# Patient Record
Sex: FEMALE | Race: WHITE | NOT HISPANIC OR LATINO | Employment: FULL TIME | URBAN - METROPOLITAN AREA
[De-identification: names, ages, dates, MRNs, and addresses within clinical notes are randomized per-mention and may not be internally consistent; named-entity substitution may affect disease eponyms.]

---

## 2019-01-17 DIAGNOSIS — G47.33 OSA (OBSTRUCTIVE SLEEP APNEA): Primary | ICD-10-CM

## 2019-02-14 ENCOUNTER — DOCUMENTATION ONLY (OUTPATIENT)
Dept: SLEEP MEDICINE | Facility: CLINIC | Age: 44
End: 2019-02-14

## 2023-03-27 ENCOUNTER — TELEPHONE (OUTPATIENT)
Dept: OPHTHALMOLOGY | Facility: CLINIC | Age: 48
End: 2023-03-27
Payer: COMMERCIAL

## 2023-03-27 NOTE — TELEPHONE ENCOUNTER
Attempted to reach patient's parents to schedule appointment to be seen by John Acuna, or Chasidy.    Left a voicemail with the clinic number for them to call back.

## 2023-04-07 ENCOUNTER — TELEPHONE (OUTPATIENT)
Dept: OPHTHALMOLOGY | Facility: CLINIC | Age: 48
End: 2023-04-07
Payer: COMMERCIAL

## 2023-04-07 NOTE — TELEPHONE ENCOUNTER
Attempted to reach out to patient to help schedule an appointment to be seen by Dr. Dia, Dr. Lopez, or Dr. Umaña for Adult Strabismus.    Left a voicemail with the clinic number for them to call back.

## 2023-05-02 ENCOUNTER — OFFICE VISIT (OUTPATIENT)
Dept: OPHTHALMOLOGY | Facility: CLINIC | Age: 48
End: 2023-05-02
Attending: OPHTHALMOLOGY
Payer: COMMERCIAL

## 2023-05-02 DIAGNOSIS — H50.15 CONSECUTIVE ALTERNATING EXOTROPIA: Primary | ICD-10-CM

## 2023-05-02 DIAGNOSIS — H51.8 DVD (DISSOCIATED VERTICAL DEVIATION): ICD-10-CM

## 2023-05-02 PROCEDURE — 92060 SENSORIMOTOR EXAMINATION: CPT | Performed by: OPHTHALMOLOGY

## 2023-05-02 PROCEDURE — G0463 HOSPITAL OUTPT CLINIC VISIT: HCPCS | Mod: 25 | Performed by: OPHTHALMOLOGY

## 2023-05-02 PROCEDURE — 92004 COMPRE OPH EXAM NEW PT 1/>: CPT | Performed by: OPHTHALMOLOGY

## 2023-05-02 PROCEDURE — 92015 DETERMINE REFRACTIVE STATE: CPT

## 2023-05-02 RX ORDER — AMLODIPINE BESYLATE 10 MG/1
1 TABLET ORAL DAILY
COMMUNITY
Start: 2022-09-26

## 2023-05-02 RX ORDER — SPIRONOLACTONE 50 MG/1
50 TABLET, FILM COATED ORAL
COMMUNITY
Start: 2022-05-26 | End: 2023-08-02

## 2023-05-02 RX ORDER — PROPRANOLOL HCL 60 MG
60 CAPSULE, EXTENDED RELEASE 24HR ORAL DAILY
COMMUNITY
Start: 2022-12-29 | End: 2023-12-29

## 2023-05-02 ASSESSMENT — REFRACTION
OS_AXIS: 090
OD_AXIS: 105
OD_SPHERE: -0.50
OD_CYLINDER: +1.00
OS_CYLINDER: +1.00
OS_SPHERE: -0.50

## 2023-05-02 ASSESSMENT — VISUAL ACUITY
CORRECTION_TYPE: GLASSES
OS_CC: 20/20
OS_CC+: +1
OD_CC: 20/15
METHOD: SNELLEN - LINEAR

## 2023-05-02 ASSESSMENT — REFRACTION_WEARINGRX
OS_ADD: +2.25
OD_SPHERE: -0.50
OS_CYLINDER: +1.00
OD_CYLINDER: +1.50
OS_SPHERE: -0.50
OD_ADD: +2.25
OS_AXIS: 095
OD_AXIS: 110

## 2023-05-02 ASSESSMENT — CONF VISUAL FIELD
OD_SUPERIOR_NASAL_RESTRICTION: 0
OD_INFERIOR_NASAL_RESTRICTION: 0
OS_NORMAL: 1
OS_INFERIOR_NASAL_RESTRICTION: 0
OS_SUPERIOR_TEMPORAL_RESTRICTION: 0
OD_SUPERIOR_TEMPORAL_RESTRICTION: 0
OD_NORMAL: 1
OD_INFERIOR_TEMPORAL_RESTRICTION: 0
OS_SUPERIOR_NASAL_RESTRICTION: 0
OS_INFERIOR_TEMPORAL_RESTRICTION: 0
METHOD: COUNTING FINGERS

## 2023-05-02 ASSESSMENT — TONOMETRY
OD_IOP_MMHG: 15
IOP_METHOD: ICARE
OS_IOP_MMHG: 11

## 2023-05-02 NOTE — NURSING NOTE
Chief Complaint(s) and History of Present Illness(es)     Strabismus Evaluation            Laterality: both eyes    Onset: present since childhood    Course: gradually worsening    Associated symptoms: Negative for droopy eyelid, blurred vision and headaches    Treatments tried: glasses and surgery    Comments: H/o congenital ET s/p repair at age 1 in [facility] 10/24/74. Worsening XT over last few years. 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 Glasses at age 4-5 and again at age 11. Not sure if she ever patched. Denies diplopia or blurred vision. Either eye will drift.           Comments    Inf: pt    Unable to get surgical records from [place].   Referred by Dr. Covarrubias for surgical eval of XT

## 2023-05-03 ASSESSMENT — EXTERNAL EXAM - LEFT EYE: OS_EXAM: NORMAL

## 2023-05-03 ASSESSMENT — SLIT LAMP EXAM - LIDS
COMMENTS: MGD
COMMENTS: MGD

## 2023-05-03 ASSESSMENT — CUP TO DISC RATIO
OD_RATIO: 0.1
OS_RATIO: 0.2

## 2023-05-03 ASSESSMENT — EXTERNAL EXAM - RIGHT EYE: OD_EXAM: NORMAL

## 2023-05-03 NOTE — PROGRESS NOTES
"Chief Complaints and History of Present Illnesses   Patient presents with     Strabismus Evaluation     H/o congenital ET s/p repair at age 1 in [facility] 10/24/74. Worsening XT over last few years. 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 Glasses at age 4-5 and again at age 11. Not sure if she ever patched. Denies diplopia or blurred vision. Either eye will drift.    Review of systems for the eyes was negative other than the pertinent positives and negatives noted in the HPI.  History is obtained from the patient     Referring provider: Referred Self     Primary care: Raghavendra Hope   Assessment   Lora Cardenas is a 47 year old female who presents with:       ICD-10-CM    1. Consecutive alternating exotropia  H50.15 Sensorimotor      2. DVD (dissociated vertical deviation)  H51.8 Sensorimotor            Plan  Lora has consecutive exotropia (s/p strabismus repair at age 1 for congenital ET)  I recommend eye muscle surgery. Today with Lora, I reviewed the indications, risks, benefits, and alternatives of eye muscle surgery including, but not limited to, failure obtain the desired ocular alignment (\"over\" or \"under\" correction), diplopia, and damage to any structure in or around the eye that may necessitate treatment with medicine, laser, or surgery. I further explained that the goal of surgery is to help control Lora's strabismus. Surgery will not \"cure\" Lora's strabismus or resolve/prevent the need for refractive correction. Additional strabismus surgery may be required in the short or long term. I emphasized that regular follow-up to monitor and optimize her vision and alignment would be necessary. We also discussed the risks of surgical injury, bleeding, and infection which may necessitate further medical or surgical treatment and which may result in diplopia, loss of vision, blindness, or loss of the eye(s) in less than 1% of cases and the remote possibility of " "permanent damage to any organ system or death with the use of general anesthesia.  I explained that we would hide visible scars as much as possible in natural creases but that every patient heals and pigments differently resulting in a variable degree of scarring to the eyes or surrounding facial structures after surgery.  I provided multiple opportunities for questions, answered all questions to the best of my ability, and confirmed that my answers and my discussion were understood.       Further details of the management plan can be found in the \"Patient Instructions\" section which was printed and given to the patient at checkout.  No follow-ups on file.   Attending Physician Attestation:  Complete documentation of historical and exam elements from today's encounter can be found in the full encounter summary report (not reduplicated in this progress note).  I personally obtained the chief complaint(s) and history of present illness.  I confirmed and edited as necessary the review of systems, past medical/surgical history, family history, social history, and examination findings as documented by others; and I examined the patient myself.  I personally reviewed the relevant tests, images, and reports as documented above.  I formulated and edited as necessary the assessment and plan and discussed the findings and management plan with the patient and family. - Candi Lopez MD 5/3/2023 11:42 AM        "

## 2023-07-19 ENCOUNTER — TELEPHONE (OUTPATIENT)
Dept: OPHTHALMOLOGY | Facility: CLINIC | Age: 48
End: 2023-07-19
Payer: COMMERCIAL

## 2023-07-21 ENCOUNTER — TELEPHONE (OUTPATIENT)
Dept: OPHTHALMOLOGY | Facility: CLINIC | Age: 48
End: 2023-07-21
Payer: COMMERCIAL

## 2023-07-28 ENCOUNTER — OFFICE VISIT (OUTPATIENT)
Dept: OPHTHALMOLOGY | Facility: CLINIC | Age: 48
End: 2023-07-28
Attending: OPHTHALMOLOGY
Payer: COMMERCIAL

## 2023-07-28 DIAGNOSIS — H51.8 DVD (DISSOCIATED VERTICAL DEVIATION): ICD-10-CM

## 2023-07-28 DIAGNOSIS — H50.15 CONSECUTIVE ALTERNATING EXOTROPIA: Primary | ICD-10-CM

## 2023-07-28 PROCEDURE — 99207 EXTERNAL PHOTOS 9 CARDINAL GAZES: CPT | Mod: 26 | Performed by: OPHTHALMOLOGY

## 2023-07-28 PROCEDURE — 92285 EXTERNAL OCULAR PHOTOGRAPHY: CPT

## 2023-07-28 RX ORDER — HYDROXYZINE HYDROCHLORIDE 25 MG/1
25 TABLET, FILM COATED ORAL
COMMUNITY
Start: 2023-06-12

## 2023-07-28 ASSESSMENT — REFRACTION_WEARINGRX
OS_ADD: +2.50
OS_SPHERE: -0.50
OD_ADD: +2.50
OD_SPHERE: -0.50
OS_CYLINDER: +1.25
OD_AXIS: 110
OD_CYLINDER: +1.50
OS_AXIS: 095

## 2023-07-28 ASSESSMENT — CONF VISUAL FIELD
OS_SUPERIOR_NASAL_RESTRICTION: 0
OS_INFERIOR_TEMPORAL_RESTRICTION: 0
METHOD: COUNTING FINGERS
OS_INFERIOR_NASAL_RESTRICTION: 0
OD_SUPERIOR_TEMPORAL_RESTRICTION: 0
OD_SUPERIOR_NASAL_RESTRICTION: 0
OD_INFERIOR_TEMPORAL_RESTRICTION: 0
OS_NORMAL: 1
OD_INFERIOR_NASAL_RESTRICTION: 0
OS_SUPERIOR_TEMPORAL_RESTRICTION: 0
OD_NORMAL: 1

## 2023-07-28 ASSESSMENT — VISUAL ACUITY
OD_CC: 20/20
OS_CC+: -1
OS_CC: 20/20
METHOD: SNELLEN - LINEAR

## 2023-07-28 NOTE — NURSING NOTE
Chief Complaint(s) and History of Present Illness(es)       Exotropia Follow Up              Laterality: both eyes    Onset: present since childhood    Associated symptoms: Negative for eye pain, blurred vision and headaches    Treatments tried: glasses and surgery    Comments: Surgery scheduled for 8/2 w/ Dr. Lopez.   Alignment stable since LV. LE seems to drift more often and bigger angle than RE. No diplopia.  Vision is clear with glasses.              Comments    Inf: pt

## 2023-07-28 NOTE — PROGRESS NOTES
Chief Complaint(s) & History of Present Illness  Chief Complaint(s) and History of Present Illness(es)       Exotropia Follow Up              Laterality: both eyes    Onset: present since childhood    Associated symptoms: Negative for eye pain, blurred vision and headaches    Treatments tried: glasses and surgery    Comments: Surgery scheduled for 8/2 w/ Dr. Lopez.   Alignment stable since LV. LE seems to drift more often and bigger angle than RE. No diplopia.  Vision is clear with glasses.              Comments    Inf: pt                     Assessment and Plan:      Lora Cardenas is a 47 year old female who presents with:     Consecutive alternating exotropia  s/p strabismus repair at age 1 for congenital ET   - Sensorimotor  - External Photos 9 [employer] Gazes    DVD (dissociated vertical deviation)  - Sensorimotor  - External Photos 9 [employer] Gazes  Stable alignment today, no diplopia        PLAN:  Continue to surgery as planned

## 2023-08-01 ENCOUNTER — ANESTHESIA EVENT (OUTPATIENT)
Dept: SURGERY | Facility: CLINIC | Age: 48
End: 2023-08-01
Payer: COMMERCIAL

## 2023-08-01 NOTE — ANESTHESIA PREPROCEDURE EVALUATION
Anesthesia Pre-Procedure Evaluation    Patient: Lora Cardenas   MRN: [id number] : 1975        Procedure : Procedure(s):  SIMPLE RECESSION OR RESECTION, MUSCLE, EXTRAOCULAR, BILATERAL, FOR STRABISMUS CORRECTION          Past Medical History:   Diagnosis Date    Asthma     EXERCISE INDUCED    Diabetes mellitus (H)     GESTATIONAL DIABETES    Hypertension     Strabismus       Past Surgical History:   Procedure Laterality Date    DAVINCI HYSTERECTOMY TOTAL  2012    Procedure:DAVINCI HYSTERECTOMY TOTAL; davinci total hysterectomy; Surgeon:CLEMENT RAYO; Location: OR    DISCECTOMY LUMBAR POSTERIOR MICROSCOPIC ONE LEVEL  2013    Procedure: DISCECTOMY LUMBAR POSTERIOR MICROSCOPIC ONE LEVEL;  MICRODISCECTOMY L5-S1  (MICROSCOPE, GOLDEN FRAME);  Surgeon: Dejan Gomez MD;  Location:  OR    ENT SURGERY      TONSILLECTOMY    EYE SURGERY  1973    congenital ET repair, [facility]    KNEE SURGERY      cartilage repair    ORTHOPEDIC SURGERY      left acl repair      Allergies   Allergen Reactions    Nuts Itching    Penicillins Anaphylaxis, Itching and Swelling     Throat tightness    Pollen Extract Difficulty breathing     Itchy throat    Pollen Extract/Tree Extract Shortness Of Breath     Itchy throat    Prunus Persica Hives and Itching     Cherries, peaches, apples plumbs  Causes throat itching, hives    Clindamycin Itching    Fruit [Peach] Hives and Itching     Cherries, peaches, apples plumbs  Causes throat itching, hives      Social History     Tobacco Use    Smoking status: Never    Smokeless tobacco: Not on file   Substance Use Topics    Alcohol use: Yes     Comment: twice a month      Wt Readings from Last 1 Encounters:   14 97.8 kg (215 lb 9.8 oz)        Anesthesia Evaluation            ROS/MED HX  ENT/Pulmonary:     (+) sleep apnea,                    asthma                  Neurologic:       Cardiovascular:     (+)  hypertension- -   -  - -                                       METS/Exercise Tolerance:     Hematologic:       Musculoskeletal:       GI/Hepatic:       Renal/Genitourinary:       Endo:       Psychiatric/Substance Use:       Infectious Disease:       Malignancy:       Other:               OUTSIDE LABS:  CBC:   Lab Results   Component Value Date    WBC 14.6 (H) 12/12/2013    WBC 9.8 12/10/2013    HGB 13.4 12/12/2013    HGB 14.8 12/10/2013    HCT 40.1 12/12/2013    HCT 43.1 12/10/2013     12/12/2013     12/10/2013     BMP:   Lab Results   Component Value Date     12/12/2013     12/10/2013    POTASSIUM 3.9 12/12/2013    POTASSIUM 3.6 12/10/2013    CHLORIDE 104 12/12/2013    CHLORIDE 97 12/10/2013    CO2 30 12/12/2013    CO2 32 12/10/2013    BUN 13 12/12/2013    BUN 20 12/10/2013    CR 0.91 12/12/2013    CR 1.01 12/10/2013     (H) 12/12/2013     (H) 12/10/2013     COAGS: No results found for: PTT, INR, FIBR  POC:   Lab Results   Component Value Date    HCG NEGATIVE 04/06/2012    HCGS Negative 12/10/2013     HEPATIC:   Lab Results   Component Value Date    ALBUMIN 3.6 03/06/2008    PROTTOTAL 6.9 03/06/2008    ALT 24 03/06/2008    AST 28 03/06/2008    ALKPHOS 133 03/06/2008    BILITOTAL <0.1 (L) 03/06/2008     OTHER:   Lab Results   Component Value Date    A1C 5.7 12/10/2013    SRIKANTH 9.0 12/12/2013    MAG 2.1 03/06/2008       Anesthesia Plan    ASA Status:  2       Anesthesia Type: General.     - Airway: ETT   Induction: Intravenous.   Maintenance: Balanced.        Consents            Postoperative Care    Pain management: IV analgesics.   PONV prophylaxis: Ondansetron (or other 5HT-3), Dexamethasone or Solumedrol     Comments:                Kanwal Zamudio MD

## 2023-08-02 ENCOUNTER — HOSPITAL ENCOUNTER (OUTPATIENT)
Facility: CLINIC | Age: 48
Discharge: HOME | End: 2023-08-02
Attending: OPHTHALMOLOGY | Admitting: OPHTHALMOLOGY
Payer: COMMERCIAL

## 2023-08-02 ENCOUNTER — ANESTHESIA (OUTPATIENT)
Dept: SURGERY | Facility: CLINIC | Age: 48
End: 2023-08-02
Payer: COMMERCIAL

## 2023-08-02 VITALS
TEMPERATURE: 98.1 F | OXYGEN SATURATION: 94 % | HEIGHT: 67 IN | RESPIRATION RATE: 14 BRPM | HEART RATE: 62 BPM | WEIGHT: 259.48 LBS | SYSTOLIC BLOOD PRESSURE: 112 MMHG | BODY MASS INDEX: 40.73 KG/M2 | DIASTOLIC BLOOD PRESSURE: 70 MMHG

## 2023-08-02 DIAGNOSIS — Z98.890 POSTOPERATIVE EYE STATE: Primary | ICD-10-CM

## 2023-08-02 PROBLEM — Z86.16 HISTORY OF 2019 NOVEL CORONAVIRUS DISEASE (COVID-19): Status: ACTIVE | Noted: 2021-01-05

## 2023-08-02 PROBLEM — F33.1 MAJOR DEPRESSIVE DISORDER, RECURRENT, MODERATE (H): Status: ACTIVE | Noted: 2020-05-04

## 2023-08-02 PROBLEM — N95.1 MENOPAUSAL SYMPTOMS: Status: ACTIVE | Noted: 2021-10-11

## 2023-08-02 PROBLEM — F33.2 SEVERE EPISODE OF RECURRENT MAJOR DEPRESSIVE DISORDER, WITHOUT PSYCHOTIC FEATURES (H): Status: ACTIVE | Noted: 2023-05-18

## 2023-08-02 PROBLEM — F41.1 GENERALIZED ANXIETY DISORDER: Status: ACTIVE | Noted: 2021-09-07

## 2023-08-02 PROBLEM — E78.5 HYPERLIPIDEMIA: Status: ACTIVE | Noted: 2021-09-07

## 2023-08-02 LAB
GLUCOSE BLDC GLUCOMTR-MCNC: 138 MG/DL (ref 70–99)
GLUCOSE BLDC GLUCOMTR-MCNC: 140 MG/DL (ref 70–99)

## 2023-08-02 PROCEDURE — 250N000009 HC RX 250: Performed by: OPHTHALMOLOGY

## 2023-08-02 PROCEDURE — 360N000076 HC SURGERY LEVEL 3, PER MIN: Performed by: OPHTHALMOLOGY

## 2023-08-02 PROCEDURE — 250N000011 HC RX IP 250 OP 636: Performed by: OPHTHALMOLOGY

## 2023-08-02 PROCEDURE — 272N000001 HC OR GENERAL SUPPLY STERILE: Performed by: OPHTHALMOLOGY

## 2023-08-02 PROCEDURE — 250N000009 HC RX 250: Performed by: NURSE ANESTHETIST, CERTIFIED REGISTERED

## 2023-08-02 PROCEDURE — 250N000011 HC RX IP 250 OP 636: Performed by: STUDENT IN AN ORGANIZED HEALTH CARE EDUCATION/TRAINING PROGRAM

## 2023-08-02 PROCEDURE — 370N000017 HC ANESTHESIA TECHNICAL FEE, PER MIN: Performed by: OPHTHALMOLOGY

## 2023-08-02 PROCEDURE — 999N000141 HC STATISTIC PRE-PROCEDURE NURSING ASSESSMENT: Performed by: OPHTHALMOLOGY

## 2023-08-02 PROCEDURE — 82962 GLUCOSE BLOOD TEST: CPT

## 2023-08-02 PROCEDURE — 250N000009 HC RX 250: Performed by: STUDENT IN AN ORGANIZED HEALTH CARE EDUCATION/TRAINING PROGRAM

## 2023-08-02 PROCEDURE — 250N000011 HC RX IP 250 OP 636: Mod: JZ | Performed by: NURSE ANESTHETIST, CERTIFIED REGISTERED

## 2023-08-02 PROCEDURE — 710N000012 HC RECOVERY PHASE 2, PER MINUTE: Performed by: OPHTHALMOLOGY

## 2023-08-02 PROCEDURE — 710N000010 HC RECOVERY PHASE 1, LEVEL 2, PER MIN: Performed by: OPHTHALMOLOGY

## 2023-08-02 PROCEDURE — 258N000003 HC RX IP 258 OP 636: Performed by: STUDENT IN AN ORGANIZED HEALTH CARE EDUCATION/TRAINING PROGRAM

## 2023-08-02 PROCEDURE — 250N000025 HC SEVOFLURANE, PER MIN: Performed by: OPHTHALMOLOGY

## 2023-08-02 PROCEDURE — 250N000013 HC RX MED GY IP 250 OP 250 PS 637: Performed by: OPHTHALMOLOGY

## 2023-08-02 RX ORDER — HYDROMORPHONE HYDROCHLORIDE 1 MG/ML
0.2 INJECTION, SOLUTION INTRAMUSCULAR; INTRAVENOUS; SUBCUTANEOUS EVERY 5 MIN PRN
Status: DISCONTINUED | OUTPATIENT
Start: 2023-08-02 | End: 2023-08-02 | Stop reason: HOSPADM

## 2023-08-02 RX ORDER — LIDOCAINE HYDROCHLORIDE 20 MG/ML
INJECTION, SOLUTION INFILTRATION; PERINEURAL PRN
Status: DISCONTINUED | OUTPATIENT
Start: 2023-08-02 | End: 2023-08-02

## 2023-08-02 RX ORDER — ACETAMINOPHEN 325 MG/1
975 TABLET ORAL ONCE
Status: COMPLETED | OUTPATIENT
Start: 2023-08-02 | End: 2023-08-02

## 2023-08-02 RX ORDER — ONDANSETRON 4 MG/1
4 TABLET, ORALLY DISINTEGRATING ORAL EVERY 30 MIN PRN
Status: DISCONTINUED | OUTPATIENT
Start: 2023-08-02 | End: 2023-08-02 | Stop reason: HOSPADM

## 2023-08-02 RX ORDER — ESTRADIOL 0.05 MG/D
1 PATCH, EXTENDED RELEASE TRANSDERMAL
COMMUNITY
Start: 2023-06-12 | End: 2024-06-11

## 2023-08-02 RX ORDER — FENTANYL CITRATE 50 UG/ML
50 INJECTION, SOLUTION INTRAMUSCULAR; INTRAVENOUS EVERY 5 MIN PRN
Status: DISCONTINUED | OUTPATIENT
Start: 2023-08-02 | End: 2023-08-02 | Stop reason: HOSPADM

## 2023-08-02 RX ORDER — SODIUM CHLORIDE, SODIUM LACTATE, POTASSIUM CHLORIDE, CALCIUM CHLORIDE 600; 310; 30; 20 MG/100ML; MG/100ML; MG/100ML; MG/100ML
INJECTION, SOLUTION INTRAVENOUS CONTINUOUS
Status: DISCONTINUED | OUTPATIENT
Start: 2023-08-02 | End: 2023-08-02 | Stop reason: HOSPADM

## 2023-08-02 RX ORDER — HYDROMORPHONE HYDROCHLORIDE 1 MG/ML
0.4 INJECTION, SOLUTION INTRAMUSCULAR; INTRAVENOUS; SUBCUTANEOUS EVERY 5 MIN PRN
Status: DISCONTINUED | OUTPATIENT
Start: 2023-08-02 | End: 2023-08-02 | Stop reason: HOSPADM

## 2023-08-02 RX ORDER — BETAMETHASONE SODIUM PHOSPHATE AND BETAMETHASONE ACETATE 3; 3 MG/ML; MG/ML
INJECTION, SUSPENSION INTRA-ARTICULAR; INTRALESIONAL; INTRAMUSCULAR; SOFT TISSUE PRN
Status: DISCONTINUED | OUTPATIENT
Start: 2023-08-02 | End: 2023-08-02 | Stop reason: HOSPADM

## 2023-08-02 RX ORDER — SODIUM CHLORIDE, SODIUM LACTATE, POTASSIUM CHLORIDE, CALCIUM CHLORIDE 600; 310; 30; 20 MG/100ML; MG/100ML; MG/100ML; MG/100ML
INJECTION, SOLUTION INTRAVENOUS CONTINUOUS PRN
Status: DISCONTINUED | OUTPATIENT
Start: 2023-08-02 | End: 2023-08-02

## 2023-08-02 RX ORDER — EPHEDRINE SULFATE 50 MG/ML
INJECTION, SOLUTION INTRAMUSCULAR; INTRAVENOUS; SUBCUTANEOUS PRN
Status: DISCONTINUED | OUTPATIENT
Start: 2023-08-02 | End: 2023-08-02

## 2023-08-02 RX ORDER — DEXAMETHASONE SODIUM PHOSPHATE 4 MG/ML
INJECTION, SOLUTION INTRA-ARTICULAR; INTRALESIONAL; INTRAMUSCULAR; INTRAVENOUS; SOFT TISSUE PRN
Status: DISCONTINUED | OUTPATIENT
Start: 2023-08-02 | End: 2023-08-02

## 2023-08-02 RX ORDER — KETOROLAC TROMETHAMINE 30 MG/ML
INJECTION, SOLUTION INTRAMUSCULAR; INTRAVENOUS PRN
Status: DISCONTINUED | OUTPATIENT
Start: 2023-08-02 | End: 2023-08-02

## 2023-08-02 RX ORDER — OXYCODONE HYDROCHLORIDE 5 MG/1
5 TABLET ORAL ONCE
Status: COMPLETED | OUTPATIENT
Start: 2023-08-02 | End: 2023-08-02

## 2023-08-02 RX ORDER — PROPOFOL 10 MG/ML
INJECTION, EMULSION INTRAVENOUS PRN
Status: DISCONTINUED | OUTPATIENT
Start: 2023-08-02 | End: 2023-08-02

## 2023-08-02 RX ORDER — OXYCODONE HYDROCHLORIDE 5 MG/1
5 TABLET ORAL EVERY 6 HOURS PRN
Qty: 6 TABLET | Refills: 0 | Status: SHIPPED | OUTPATIENT
Start: 2023-08-02 | End: 2023-08-05

## 2023-08-02 RX ORDER — ONDANSETRON 2 MG/ML
4 INJECTION INTRAMUSCULAR; INTRAVENOUS EVERY 30 MIN PRN
Status: DISCONTINUED | OUTPATIENT
Start: 2023-08-02 | End: 2023-08-02 | Stop reason: HOSPADM

## 2023-08-02 RX ORDER — FENTANYL CITRATE 50 UG/ML
INJECTION, SOLUTION INTRAMUSCULAR; INTRAVENOUS PRN
Status: DISCONTINUED | OUTPATIENT
Start: 2023-08-02 | End: 2023-08-02

## 2023-08-02 RX ORDER — FENTANYL CITRATE 50 UG/ML
25 INJECTION, SOLUTION INTRAMUSCULAR; INTRAVENOUS EVERY 5 MIN PRN
Status: DISCONTINUED | OUTPATIENT
Start: 2023-08-02 | End: 2023-08-02 | Stop reason: HOSPADM

## 2023-08-02 RX ORDER — BALANCED SALT SOLUTION 6.4; .75; .48; .3; 3.9; 1.7 MG/ML; MG/ML; MG/ML; MG/ML; MG/ML; MG/ML
SOLUTION OPHTHALMIC PRN
Status: DISCONTINUED | OUTPATIENT
Start: 2023-08-02 | End: 2023-08-02 | Stop reason: HOSPADM

## 2023-08-02 RX ORDER — OXYMETAZOLINE HYDROCHLORIDE 0.05 G/100ML
SPRAY NASAL PRN
Status: DISCONTINUED | OUTPATIENT
Start: 2023-08-02 | End: 2023-08-02 | Stop reason: HOSPADM

## 2023-08-02 RX ADMIN — Medication 10 MG: at 15:03

## 2023-08-02 RX ADMIN — MIDAZOLAM 2 MG: 1 INJECTION INTRAMUSCULAR; INTRAVENOUS at 14:06

## 2023-08-02 RX ADMIN — Medication 5 MG: at 15:09

## 2023-08-02 RX ADMIN — DEXAMETHASONE SODIUM PHOSPHATE 4 MG: 4 INJECTION, SOLUTION INTRA-ARTICULAR; INTRALESIONAL; INTRAMUSCULAR; INTRAVENOUS; SOFT TISSUE at 14:38

## 2023-08-02 RX ADMIN — SODIUM CHLORIDE, POTASSIUM CHLORIDE, SODIUM LACTATE AND CALCIUM CHLORIDE: 600; 310; 30; 20 INJECTION, SOLUTION INTRAVENOUS at 14:21

## 2023-08-02 RX ADMIN — PHENYLEPHRINE HYDROCHLORIDE 100 MCG: 10 INJECTION INTRAVENOUS at 15:03

## 2023-08-02 RX ADMIN — OXYCODONE HYDROCHLORIDE 5 MG: 5 TABLET ORAL at 16:32

## 2023-08-02 RX ADMIN — SUGAMMADEX 400 MG: 100 INJECTION, SOLUTION INTRAVENOUS at 15:46

## 2023-08-02 RX ADMIN — Medication 50 MG: at 14:21

## 2023-08-02 RX ADMIN — PROPOFOL 150 MG: 10 INJECTION, EMULSION INTRAVENOUS at 14:24

## 2023-08-02 RX ADMIN — ACETAMINOPHEN 975 MG: 325 TABLET, FILM COATED ORAL at 16:32

## 2023-08-02 RX ADMIN — FENTANYL CITRATE 100 MCG: 50 INJECTION, SOLUTION INTRAMUSCULAR; INTRAVENOUS at 14:21

## 2023-08-02 RX ADMIN — Medication 20 MG: at 15:12

## 2023-08-02 RX ADMIN — FENTANYL CITRATE 50 MCG: 50 INJECTION INTRAMUSCULAR; INTRAVENOUS at 16:32

## 2023-08-02 RX ADMIN — KETOROLAC TROMETHAMINE 30 MG: 30 INJECTION, SOLUTION INTRAMUSCULAR at 15:43

## 2023-08-02 RX ADMIN — PROPOFOL 200 MG: 10 INJECTION, EMULSION INTRAVENOUS at 14:21

## 2023-08-02 RX ADMIN — LIDOCAINE HYDROCHLORIDE 100 MG: 20 INJECTION, SOLUTION INFILTRATION; PERINEURAL at 14:06

## 2023-08-02 ASSESSMENT — ACTIVITIES OF DAILY LIVING (ADL)
ADLS_ACUITY_SCORE: 35
ADLS_ACUITY_SCORE: 33

## 2023-08-02 NOTE — ANESTHESIA PROCEDURE NOTES
Airway       Patient location during procedure: OR       Procedure Start/Stop Times: 8/2/2023 2:27 PM  Staff -        Anesthesiologist:  Behrens, Christopher J, MD       Resident/Fellow: Kanwal Zamudio MD       Performed By: resident and with residents       Procedure performed by resident/fellow/CRNA in presence of a teaching physician.    Consent for Airway        Urgency: elective  Indications and Patient Condition       Indications for airway management: jenna-procedural       Induction type:intravenous       Mask difficulty assessment: 1 - vent by mask    Final Airway Details       Final airway type: endotracheal airway       Successful airway: ETT - single  Endotracheal Airway Details        ETT size (mm): 7.0       Cuffed: yes       Successful intubation technique: direct laryngoscopy       DL Blade Type: MAC 3       Grade View of Cords: 2       Adjucts: stylet       Position: Right       Measured from: lips       Secured at (cm): 21       Bite block used: Soft    Post intubation assessment        Placement verified by: capnometry, equal breath sounds and chest rise        Number of attempts at approach: 1       Number of other approaches attempted: 0       Secured with: silk tape       Ease of procedure: easy       Dentition: Intact    Medication(s) Administered   Medication Administration Time: 8/2/2023 2:27 PM

## 2023-08-02 NOTE — DISCHARGE INSTRUCTIONS
Cool compresses as needed.  Ibuprofen and Tylenol as directed.  Oxycodone for breakthrough pain.  F/u with Dr. Lopez tomorrow as scheduled.      Strabismus Repair Discharge Instructions    Dr. Candi Lopez, Dr. Gurinder Ugarte, Dr. Indu Dia      Your eye doctor performed surgery to help align your eye(s). During surgery, certain eye muscles are adjusted. This helps the muscles better control how the eye moves. Often, surgery is done in addition to other treatments.   How Surgery Works  Strabismus surgery is a safe, common operation. The doctor changes the placement or length of an eye muscle. This small change can pull the eye into proper alignment. The two most common methods of surgery are:  Recession, in which a muscle is moved to a new position on the eye.  Resection, in which a small section of an eye muscle is removed.  What to Expect  It is normal to experience the following:  Eye Redness. This will resolve slowly over 2- 4 weeks.  Pink tinged tears  Swollen, painful or itchy eyes  Sensitivity to bright lights.  Trouble opening eyes for 1-2 days.  Feeling dizzy or off balance until you get used to seeing differently.  After Surgery Care  Avoid rubbing your eyes.  You may use cool cloths to help with pain and/or itching.  Minimize direct contact with water for 1 week. Showering or bathing is allowed.  You may use a warm, wet washcloth to remove any dried drainage from the eye. Wipe eye from inner corner to the outer corner of the eye.   No swimming for 1 week.  Use sunglasses or a hat to protect eyes from bright lights if you are light sensitive.  You may wear glasses as soon as needed.  No heavy lifting or contact sports for 1 week.   Use eye drops or eye ointment as directed to prevent infection and decrease swelling. Avoid touching surface of eye with the tube or bottle.   Suture Care  Sutures will dissolve over several weeks; they will not need to be removed.   Adjustable sutures may have been placed  during surgery. You may need to stay in the recovery room for a longer period after surgery before a possible suture adjustment is performed. Once the suture is adjusted you will be sent home.   When to Call the Doctor   Eyelids are progressively getting more swollen and painful after 24 hours.  You see a dramatic change in the position of the eye over time.  Frequent or continuous vomiting.  Green or yellow drainage from the eye.  Temperature over 101 degrees.  If your child experiences worsening RSVP (Redness, Sensitivity to light, Vision, Pain), or develops fever or worsening discharge, call EITHER  [phone] (8am-4:30pm Monday-Friday)  [phone] (after hours & weekends)  and ask to speak with the Ophthalmology Resident or Fellow On-Call or return to the eye clinic or emergency room immediately.        If your child is unable to tolerate food and drink, vomits 3 times, or appears to have decreased alertness or lethargy, return to the emergency room immediately. These can be signs of delayed gastrointestinal wake-up after anesthesia and your child may need IV fluids to prevent dehydration.    Follow Up  Follow up with your doctor in ***  Rev. 3/2018        Same-Day Surgery   Adult Discharge Orders & Instructions     For 24 hours after surgery:  Get plenty of rest.  A responsible adult must stay with you for at least 24 hours after you leave the hospital.   Pain medication can slow your reflexes. Do not drive or use heavy equipment.  If you have weakness or tingling, don't drive or use heavy equipment until this feeling goes away.  Mixing alcohol and pain medication can cause dizziness and slow your breathing. It can even be fatal. Do not drink alcohol while taking pain medication.  Avoid strenuous or risky activities.  Ask for help when climbing stairs.   You may feel lightheaded.  If so, sit for a few minutes before standing.  Have someone help you get up.   If you have nausea (feel sick to your  stomach), drink only clear liquids such as apple juice, ginger ale, broth or 7-Up.  Rest may also help.  Be sure to drink enough fluids.  Move to a regular diet as you feel able. Take pain medications with a small amount of solid food, such as toast or crackers, to avoid nausea.   A slight fever is normal. Call the doctor if your fever is over 100 F (37.7 C) (taken under the tongue) or lasts longer than 24 hours.  You may have a dry mouth, muscle aches, trouble sleeping or a sore throat.  These symptoms should go away after 24 hours.  Do not make important or legal decisions.   Pain Management:      1. Take pain medication (if prescribed) for pain as directed by your physician.        2. WARNING: If the pain medication you have been prescribed contains Tylenol  (acetaminophen), DO NOT take additional doses of Tylenol (acetaminophen).     Call your doctor for any of the followin.  Signs of infection (fever, growing tenderness at the surgery site, severe pain, a large amount of drainage or bleeding, foul-smelling drainage, redness, swelling).    2.  It has been over 8 to 10 hours since surgery and you are still not able to urinate (pee).    3.  Headache for over 24 hours.    4.  Numbness, tingling or weakness the day after surgery (if you had spinal anesthesia).  To contact a doctor, call  ' [phone] and ask for the Resident On Call for:          ___ Ophthalmology ___ (answered 24 hours a day)  '   Emergency Department:  [place] [facility]: [phone]  [place] [facility]: [phone]               Rev. 10/2014

## 2023-08-02 NOTE — ANESTHESIA POSTPROCEDURE EVALUATION
Patient: Lora Cardenas    Procedure: Procedure(s):  SIMPLE RECESSION OR RESECTION, MUSCLE, EXTRAOCULAR, BILATERAL, FOR STRABISMUS CORRECTION       Anesthesia Type:  General    Note:  Disposition: Outpatient   Postop Pain Control: Uneventful            Sign Out: Well controlled pain   PONV: No   Neuro/Psych: Uneventful            Sign Out: Acceptable/Baseline neuro status   Airway/Respiratory: Uneventful            Sign Out: Acceptable/Baseline resp. status   CV/Hemodynamics: Uneventful            Sign Out: Acceptable CV status   Other NRE: NONE   DID A NON-ROUTINE EVENT OCCUR? No           Last vitals:  Vitals Value Taken Time   /70 08/02/23 1617   Temp     Pulse 73 08/02/23 1619   Resp 11 08/02/23 1619   SpO2 93 % 08/02/23 1619   Vitals shown include unvalidated device data.    Electronically Signed By: Christopher J. Behrens, MD  August 2, 2023  4:20 PM

## 2023-08-02 NOTE — ANESTHESIA CARE TRANSFER NOTE
Patient: Lora Cardenas    Procedure: Procedure(s):  SIMPLE RECESSION OR RESECTION, MUSCLE, EXTRAOCULAR, BILATERAL, FOR STRABISMUS CORRECTION       Diagnosis: Consecutive alternating exotropia [H50.15]  DVD (dissociated vertical deviation) [H51.8]  Diagnosis Additional Information: No value filed.    Anesthesia Type:   General     Note:    Oropharynx: spontaneously breathing  Level of Consciousness: drowsy  Oxygen Supplementation: nasal cannula    Independent Airway: airway patency satisfactory and stable  Dentition: dentition unchanged  Vital Signs Stable: post-procedure vital signs reviewed and stable  Report to RN Given: handoff report given  Patient transferred to: PACU    Handoff Report: Identifed the Patient, Identified the Reponsible Provider, Reviewed the pertinent medical history, Discussed the surgical course, Reviewed Intra-OP anesthesia mangement and issues during anesthesia, Set expectations for post-procedure period and Allowed opportunity for questions and acknowledgement of understanding      Vitals:  Vitals Value Taken Time   BP     Temp     Pulse     Resp     SpO2         Electronically Signed By: DYAN Yao CRNA  August 2, 2023  4:00 PM

## 2023-08-03 ENCOUNTER — OFFICE VISIT (OUTPATIENT)
Dept: OPHTHALMOLOGY | Facility: CLINIC | Age: 48
End: 2023-08-03
Attending: OPHTHALMOLOGY
Payer: COMMERCIAL

## 2023-08-03 DIAGNOSIS — H50.15 CONSECUTIVE ALTERNATING EXOTROPIA: Primary | ICD-10-CM

## 2023-08-03 DIAGNOSIS — H51.8 DVD (DISSOCIATED VERTICAL DEVIATION): ICD-10-CM

## 2023-08-03 PROCEDURE — 99024 POSTOP FOLLOW-UP VISIT: CPT | Performed by: OPHTHALMOLOGY

## 2023-08-03 PROCEDURE — G0463 HOSPITAL OUTPT CLINIC VISIT: HCPCS | Performed by: OPHTHALMOLOGY

## 2023-08-03 ASSESSMENT — REFRACTION_WEARINGRX
OS_CYLINDER: +1.25
OD_CYLINDER: +1.50
OD_AXIS: 110
OS_ADD: +2.50
OD_ADD: +2.50
OD_SPHERE: -0.50
OS_SPHERE: -0.50
OS_AXIS: 095

## 2023-08-03 ASSESSMENT — VISUAL ACUITY
OS_CC+: -2
OD_CC+: -2
OD_CC: 20/25
OS_CC: 20/25
METHOD: SNELLEN - LINEAR

## 2023-08-03 ASSESSMENT — TONOMETRY: IOP_UNABLETOASSESS: 1

## 2023-08-03 ASSESSMENT — PATIENT HEALTH QUESTIONNAIRE - PHQ9: SUM OF ALL RESPONSES TO PHQ QUESTIONS 1-9: 4

## 2023-08-23 NOTE — OP NOTE
DOS 8/2/2023    PREOPERATIVE DIAGNOSIS:    1.  Consecutive exotropia.  2   Status post strabismus repair for congenital esotropia at age 1 in [place]    POSTOPERATIVE DIAGNOSIS:   Same    PROCEDURE PERFORMED:    1.  Forced duction testing  2.  Exploration and lysis of adhesions on previously operated right medial rectus and left medial rectus muscles.  3.  Advancement of right medial rectus from 5.0 mm to 3.0 mm from the original insertion.  4.  Advancement of left medial rectus muscle from 6.00mm to 4.0 mm from the original insertion.  5.  subConjunctival injection of Celestone both eyes    STAFF SURGEON: Candi Lopez MD.     ANESTHESIA: General.     ESTIMATED BLOOD LOSS: 1 mL.     COMPLICATIONS: None.     INDICATION FOR PROCEDURE  Ms. Cardenas is a 47-year-old woman who has noticed increasing exotropia.  She has a history of strabismus repair at age 1 for congenital esotropia.  The risk benefits and alternatives to strabismus repair to restore her binocular alignment were discussed including but not limited to infection, bleeding, loss of vision, over or under correction of her alignment, diplopia, need for further surgery.  She elects to proceed.    DETAILS OF PROCEDURE  After informed consent was obtained, Ms. Cardenas was taken to the operating room where general anesthesia was induced without complication. She was prepped and draped in sterile ophthalmic fashion.  Lid speculum were placed in both eyes and forced duction testing was performed.  There was no tightness in any direction.  The speculum was removed from the left eye and occluder was placed.  5-0 Traction suture placed at 6 and 12:00 of the limbus of the right eye and the eye was placed in the AB ducted position.  Nasal conjunctival peritomy was performed.  The inferonasal and superior nasal quadrants were dissected.  The right medial rectus muscle was hooked using small and Ghanshyam hooks.  The conjunctiva was carefully thinned and the scar tissue was  lysed as this was a previously operated muscle.  The muscle was carefully inspected and found to be in good condition.  It was noted to be 5.0 mm posterior to the original insertion.  The lid speculum was removed from the right eye and placed in the left eye.  5-0 silk traction sutures were placed at 6 and 12:00 of the limbus of the left eye.  The eye was placed in the AB ducted position.  A nasal conjunctival peritomy was performed. the inferonasal and superior nasal quadrants were dissected.  The left medial rectus muscle was poked using small and Ghanshyam hooks.  The conjunctiva was carefully thinned and the scar tissue was lysed as this was a previously operated muscle. the muscle was carefully inspected and noted to be in good condition.  The muscle was 6.0 mm posterior to the original insertion.  The insertion was imbricated using 6-0 double-armed Vicryl suture using central and peripheral locking bites.  The muscle was disinserted from the globe.  Cautery was used for hemostasis.  A caliper was used to measure 4.0 mm posterior to the original insertion and this was marked with a marking pen.  Scleral passes were performed to these marks and the muscle was tied in a bow.  The lid speculum was removed from the left eye and placed in the right eye the right medial process was prepped using small and Ghanshyam hooks. 6-0 double-armed Vicryl suture was used to imbricate the muscle at the insertion using central peripheral locking bites.  The muscle was disinserted from the globe.  Cautery was used for hemostasis.  A caliper was used to measure 3.0 mm posterior to the original surgeon and this was marked with a marking pen.  Scleral passes were performed these marks and the muscle was tied in a bow.  Snapback testing was performed and the eyes were felt to be in good alignment.  Both muscles were tied in a knot.  8-0 Vicryl was used to repair the conjunctiva.  Subconjunctival injections of Celestone were given to both  eyes.  TobraDex ointment was placed in both eyes.  Ms. Cardenas tolerated the procedure well and went to recovery room in stable condition.  She will follow-up in postoperative a #1 in the eye clinic.    NORRIS RUBIO MD

## 2023-08-25 ASSESSMENT — SLIT LAMP EXAM - LIDS
COMMENTS: MGD
COMMENTS: MGD

## 2023-08-25 ASSESSMENT — CUP TO DISC RATIO
OD_RATIO: 0.1
OS_RATIO: 0.2

## 2023-08-25 ASSESSMENT — EXTERNAL EXAM - LEFT EYE: OS_EXAM: NORMAL

## 2023-08-25 ASSESSMENT — EXTERNAL EXAM - RIGHT EYE: OD_EXAM: NORMAL

## 2023-08-25 NOTE — PROGRESS NOTES
"Chief Complaints and History of Present Illnesses   Patient presents with    Post Op (Ophthalmology) Both Eyes     POD1 Eye pain, irritation and nausea this am. Some intermittent diplopia, wasn't able to open eyes yesterday, better today. Taking codeine, tylenol and ibuprofen (8:15 am today)   Review of systems for the eyes was negative other than the pertinent positives and negatives noted in the HPI.  History is obtained from the patient    Referring provider: Referred Self     Primary care: Shelley Méndez BEBETO Cardenas is a 47 year old female who presents with:       ICD-10-CM    1. Consecutive alternating exotropia  H50.15       2. DVD (dissociated vertical deviation)  H51.8             Plan  Lora is doing well on Postoperative day #1 s/p  1.  Forced duction testing  2.  Exploration and lysis of adhesions on previously operated right medial rectus and left medial rectus muscles.  3.  Advancement of right medial rectus from 5.0 mm to 3.0 mm from the original insertion.  4.  Advancement of left medial rectus muscle from 6.00mm to 4.0 mm from the original insertion.  5.  subConjunctival injection of Celestone both eyes   Call with increasing pain,lid edema and erythema, and discharge.  Tobradex ointment twice a day x 5 days.  F/u 2-3 months       Further details of the management plan can be found in the \"Patient Instructions\" section which was printed and given to the patient at checkout.  No follow-ups on file.   Attending Physician Attestation:  Complete documentation of historical and exam elements from today's encounter can be found in the full encounter summary report (not reduplicated in this progress note).  I personally obtained the chief complaint(s) and history of present illness.  I confirmed and edited as necessary the review of systems, past medical/surgical history, family history, social history, and examination findings as documented by others; and I examined the patient myself.  " I personally reviewed the relevant tests, images, and reports as documented above.  I formulated and edited as necessary the assessment and plan and discussed the findings and management plan with the patient and family. - Candi Lopez MD 8/25/2023 11:48 AM

## 2024-08-31 ENCOUNTER — TELEPHONE (OUTPATIENT)
Dept: BEHAVIORAL HEALTH | Facility: CLINIC | Age: 49
End: 2024-08-31

## 2024-08-31 ENCOUNTER — HOSPITAL ENCOUNTER (EMERGENCY)
Facility: CLINIC | Age: 49
Discharge: HOME | End: 2024-09-02
Attending: EMERGENCY MEDICINE | Admitting: EMERGENCY MEDICINE
Payer: COMMERCIAL

## 2024-08-31 DIAGNOSIS — R45.851 SUICIDAL IDEATION: ICD-10-CM

## 2024-08-31 DIAGNOSIS — F33.2 MDD (MAJOR DEPRESSIVE DISORDER), RECURRENT SEVERE, WITHOUT PSYCHOSIS (H): ICD-10-CM

## 2024-08-31 DIAGNOSIS — F41.9 ANXIETY: ICD-10-CM

## 2024-08-31 PROCEDURE — 99222 1ST HOSP IP/OBS MODERATE 55: CPT | Mod: 95 | Performed by: PSYCHIATRY & NEUROLOGY

## 2024-08-31 PROCEDURE — 99285 EMERGENCY DEPT VISIT HI MDM: CPT | Mod: 25 | Performed by: EMERGENCY MEDICINE

## 2024-08-31 PROCEDURE — 250N000013 HC RX MED GY IP 250 OP 250 PS 637: Performed by: PSYCHIATRY & NEUROLOGY

## 2024-08-31 RX ORDER — AMLODIPINE BESYLATE 5 MG/1
10 TABLET ORAL EVERY EVENING
Status: DISCONTINUED | OUTPATIENT
Start: 2024-08-31 | End: 2024-09-02 | Stop reason: HOSPADM

## 2024-08-31 RX ORDER — ESTRADIOL 0.05 MG/D
1 PATCH, EXTENDED RELEASE TRANSDERMAL
Status: DISCONTINUED | OUTPATIENT
Start: 2024-09-02 | End: 2024-09-02 | Stop reason: HOSPADM

## 2024-08-31 RX ORDER — BUPROPION HYDROCHLORIDE 75 MG/1
75 TABLET ORAL DAILY
Status: DISCONTINUED | OUTPATIENT
Start: 2024-09-01 | End: 2024-09-02 | Stop reason: HOSPADM

## 2024-08-31 RX ORDER — PROPRANOLOL HCL 60 MG
60 CAPSULE, EXTENDED RELEASE 24HR ORAL EVERY EVENING
Status: DISCONTINUED | OUTPATIENT
Start: 2024-08-31 | End: 2024-09-02 | Stop reason: HOSPADM

## 2024-08-31 RX ORDER — BUPROPION HYDROCHLORIDE 75 MG/1
75 TABLET ORAL DAILY
COMMUNITY
End: 2024-09-02

## 2024-08-31 RX ORDER — LORAZEPAM 0.5 MG/1
0.5 TABLET ORAL EVERY 4 HOURS PRN
Status: DISCONTINUED | OUTPATIENT
Start: 2024-08-31 | End: 2024-09-02 | Stop reason: HOSPADM

## 2024-08-31 RX ORDER — TRAZODONE HYDROCHLORIDE 50 MG/1
50 TABLET, FILM COATED ORAL
Status: DISCONTINUED | OUTPATIENT
Start: 2024-08-31 | End: 2024-09-01

## 2024-08-31 RX ORDER — SPIRONOLACTONE 25 MG/1
50 TABLET ORAL EVERY EVENING
Status: DISCONTINUED | OUTPATIENT
Start: 2024-08-31 | End: 2024-09-02 | Stop reason: HOSPADM

## 2024-08-31 RX ADMIN — TRAZODONE HYDROCHLORIDE 50 MG: 50 TABLET ORAL at 22:12

## 2024-08-31 RX ADMIN — SPIRONOLACTONE 50 MG: 25 TABLET ORAL at 20:55

## 2024-08-31 RX ADMIN — FLUOXETINE 40 MG: 20 CAPSULE ORAL at 20:55

## 2024-08-31 RX ADMIN — LORAZEPAM 0.5 MG: 0.5 TABLET ORAL at 20:40

## 2024-08-31 RX ADMIN — AMLODIPINE BESYLATE 10 MG: 5 TABLET ORAL at 20:40

## 2024-08-31 ASSESSMENT — COLUMBIA-SUICIDE SEVERITY RATING SCALE - C-SSRS
6. HAVE YOU EVER DONE ANYTHING, STARTED TO DO ANYTHING, OR PREPARED TO DO ANYTHING TO END YOUR LIFE?: NO
4. HAVE YOU HAD THESE THOUGHTS AND HAD SOME INTENTION OF ACTING ON THEM?: NO
5. HAVE YOU STARTED TO WORK OUT OR WORKED OUT THE DETAILS OF HOW TO KILL YOURSELF? DO YOU INTEND TO CARRY OUT THIS PLAN?: NO
3. HAVE YOU BEEN THINKING ABOUT HOW YOU MIGHT KILL YOURSELF?: YES
2. HAVE YOU ACTUALLY HAD ANY THOUGHTS OF KILLING YOURSELF IN THE PAST MONTH?: YES
1. IN THE PAST MONTH, HAVE YOU WISHED YOU WERE DEAD OR WISHED YOU COULD GO TO SLEEP AND NOT WAKE UP?: YES

## 2024-08-31 ASSESSMENT — ACTIVITIES OF DAILY LIVING (ADL)
ADLS_ACUITY_SCORE: 36

## 2024-08-31 NOTE — ED TRIAGE NOTES
Suicidal ideation, plan was to take all her meds, she gave those meds to her friend yesterday. Here with her .

## 2024-08-31 NOTE — PROGRESS NOTES
"48 year old female received from ED due to suicidal ideation with plan to overdose. Reports she told her plan to a friend and gave her medications to this friend for safety. Pt reports relationship problems with her two sons who \"want nothing to do with me\". She states she has had a really difficult year and is \"consumed by dark thoughts of wanting to be dead\". Pt denies past suicide attempts. Denies HI/AH/VH. Reports occasional alcohol use. Denies illicit drug use.     Pt is cooperative. Tearful and sad during intake.     Nursing and risk assessments completed. Assessments reviewed with LMHP and physician. Admission information reviewed with patient. Patient given a tour of [facility] and instructions on using the facility. Questions regarding [facility] addressed. Pt safety search completed.     "

## 2024-08-31 NOTE — ED NOTES
[facility]  ED to EMPATH Checklist:      Goal for EMPATH: Suicidality    Current Behavior: Calm and Cooperative    Safety Concerns: Suicidal, with a plan to take her meds    Legal Hold Status: Voluntary    Medically Cleared by ED provider: Yes    Patient Therapeutically Searched: Not searched - Currently in triage    Belongings: Remain with patient    Independent Ambulation at Baseline: Yes/No: Yes    Participates in Care/Conversation: Yes/No: Yes    Patient Informed about EMPATH: Yes/No: Yes    DEC: Ordered and pending    Patient Ready to be Transferred to EMPATH? Yes/No: Yes

## 2024-08-31 NOTE — ED PROVIDER NOTES
"  Emergency Department Note      History of Present Illness     Chief Complaint   Suicidal    HPI   Lora Cardenas is a 48 year old female with history of depression who presents to the ED with her  for evaluation of suicidal ideations. The patient reports that she has had suicidal ideations for a few days, stating that she \"doesn't want to be alive anymore\". Patient explains that these feelings began after she got a letter from her son about how he did not want to see her anymore. Lora has not done anything to harm herself, but she has thought about taking pills or driving her car into something. Patient reports being on multiple blood pressure medications, fluoxetine, and Wellbutrin, and she is taking all of these as prescribed. She states that she did Google how much hydroxyzine she needed to take to harm herself, however she did not take an excessive amount and instead she gave the medication to her friend yesterday. Endorses minimal alcohol use, but denies drug use.    Independent Historian   None    Review of External Notes   4/12/24: Clinic note reviewed    Past Medical History     Medical History and Problem List   Asthma  Diabetes  Hypertension  Strabismus  Radiculopathy, lumbosacral region  Herniated lumbar intervertebral disc  Obstructive sleep apnea  Depression  Hyperlipidemia  COVID-19  Anxiety    Medications   Norvasc   Atarax  Inderal  Zoloft  Aldactone  Hydrochlorothiazide  Pyridium   Wellbutrin   Prozac   Pepcid     Surgical History   DaVinci total hysterectomy  L5-S1 microdiscectomy   Tonsillectomy  Eye surgery, congenital ET repair  Knee surgery, cartilage repair  Left ACL repair  Bilateral strabismus repair  Bilateral laser eye surgery   C5 discectomy     Physical Exam     Patient Vitals for the past 24 hrs:   BP Temp Temp src Pulse Resp SpO2   08/31/24 1258 (!) 160/102 97.8  F (36.6  C) Temporal 57 20 95 %     Physical Exam  General: No acute distress.  Head: No obvious trauma to " head.  Eyes:  Conjunctivae clear.   Neck: Normal range of motion.   CV: Skin is well perfused, no cyanosis  Respiratory: Effort normal. No audible wheezing.  Gastrointestinal: Non-distended.  Musculoskeletal: Normal range of motion. No gross deformities.  Neuro: Alert. Moving all extremities appropriately.  Normal speech.    Skin: No rashes or lesions on exposed skin.   Psych: Suicidal ideation.    Diagnostics     Lab Results   None     Imaging   None     EKG   None     Independent Interpretation   None    ED Course      Medications Administered   Medications - No data to display    Procedures   None      Discussion of Management   None    ED Course   ED Course as of 08/31/24 1325   Sat Aug 31, 2024   1311 I obtained history and examined the patient as noted above.      Additional Documentation  None    Medical Decision Making / Diagnosis     CMS Diagnoses: None    MIPS       None    MDM   Lora Cardenas is a 48 year old female presenting with her  with suicidal ideation.  She denies taking any medication that is not prescribed to her, or any other drug use.  She has no other complaints.  She is medically cleared for empath and she is interested in receiving help.  She remains calm and cooperative and is transferred to the empath unit.    Disposition   The patient was transferred to [facility].     Diagnosis     ICD-10-CM    1. Suicidal ideation  R45.851          Discharge Medications   New Prescriptions    No medications on file     Scribe Disclosure:  SUDHIR POP, am serving as a scribe at 1:18 PM on 8/31/2024 to document services personally performed by Nitish Woods MD based on my observations and the provider's statements to me.      Nitish Woods MD  08/31/24 [id number]

## 2024-09-01 ENCOUNTER — TELEPHONE (OUTPATIENT)
Dept: BEHAVIORAL HEALTH | Facility: CLINIC | Age: 49
End: 2024-09-01
Payer: COMMERCIAL

## 2024-09-01 LAB
ALBUMIN SERPL BCG-MCNC: 4.4 G/DL (ref 3.5–5.2)
ALP SERPL-CCNC: 157 U/L (ref 40–150)
ALT SERPL W P-5'-P-CCNC: 39 U/L (ref 0–50)
AMPHETAMINES UR QL SCN: ABNORMAL
ANION GAP SERPL CALCULATED.3IONS-SCNC: 13 MMOL/L (ref 7–15)
AST SERPL W P-5'-P-CCNC: 32 U/L (ref 0–45)
BARBITURATES UR QL SCN: ABNORMAL
BENZODIAZ UR QL SCN: ABNORMAL
BILIRUB SERPL-MCNC: 0.4 MG/DL
BUN SERPL-MCNC: 14.2 MG/DL (ref 6–20)
BZE UR QL SCN: ABNORMAL
CALCIUM SERPL-MCNC: 9.8 MG/DL (ref 8.8–10.4)
CANNABINOIDS UR QL SCN: ABNORMAL
CHLORIDE SERPL-SCNC: 100 MMOL/L (ref 98–107)
CREAT SERPL-MCNC: 0.89 MG/DL (ref 0.51–0.95)
EGFRCR SERPLBLD CKD-EPI 2021: 79 ML/MIN/1.73M2
ERYTHROCYTE [DISTWIDTH] IN BLOOD BY AUTOMATED COUNT: 12.8 % (ref 10–15)
FENTANYL UR QL: ABNORMAL
GLUCOSE SERPL-MCNC: 213 MG/DL (ref 70–99)
HCO3 SERPL-SCNC: 24 MMOL/L (ref 22–29)
HCT VFR BLD AUTO: 44.9 % (ref 35–47)
HGB BLD-MCNC: 14.9 G/DL (ref 11.7–15.7)
MCH RBC QN AUTO: 28.9 PG (ref 26.5–33)
MCHC RBC AUTO-ENTMCNC: 33.2 G/DL (ref 31.5–36.5)
MCV RBC AUTO: 87 FL (ref 78–100)
OPIATES UR QL SCN: ABNORMAL
PCP QUAL URINE (ROCHE): ABNORMAL
PLATELET # BLD AUTO: 256 10E3/UL (ref 150–450)
POTASSIUM SERPL-SCNC: 4.2 MMOL/L (ref 3.4–5.3)
PROT SERPL-MCNC: 7 G/DL (ref 6.4–8.3)
RBC # BLD AUTO: 5.16 10E6/UL (ref 3.8–5.2)
SODIUM SERPL-SCNC: 137 MMOL/L (ref 135–145)
WBC # BLD AUTO: 7 10E3/UL (ref 4–11)

## 2024-09-01 PROCEDURE — 80053 COMPREHEN METABOLIC PANEL: CPT | Performed by: PSYCHIATRY & NEUROLOGY

## 2024-09-01 PROCEDURE — 80307 DRUG TEST PRSMV CHEM ANLYZR: CPT | Performed by: PSYCHIATRY & NEUROLOGY

## 2024-09-01 PROCEDURE — 85027 COMPLETE CBC AUTOMATED: CPT | Performed by: PSYCHIATRY & NEUROLOGY

## 2024-09-01 PROCEDURE — 250N000013 HC RX MED GY IP 250 OP 250 PS 637

## 2024-09-01 PROCEDURE — 36415 COLL VENOUS BLD VENIPUNCTURE: CPT | Performed by: PSYCHIATRY & NEUROLOGY

## 2024-09-01 PROCEDURE — 250N000013 HC RX MED GY IP 250 OP 250 PS 637: Performed by: PSYCHIATRY & NEUROLOGY

## 2024-09-01 RX ORDER — ACETAMINOPHEN 325 MG/1
650 TABLET ORAL EVERY 4 HOURS PRN
Status: DISCONTINUED | OUTPATIENT
Start: 2024-09-01 | End: 2024-09-02 | Stop reason: HOSPADM

## 2024-09-01 RX ORDER — HYDROXYZINE HYDROCHLORIDE 50 MG/1
50 TABLET, FILM COATED ORAL 3 TIMES DAILY PRN
Status: DISCONTINUED | OUTPATIENT
Start: 2024-09-01 | End: 2024-09-02 | Stop reason: HOSPADM

## 2024-09-01 RX ORDER — TRAZODONE HYDROCHLORIDE 50 MG/1
50 TABLET, FILM COATED ORAL
Status: DISCONTINUED | OUTPATIENT
Start: 2024-09-01 | End: 2024-09-02 | Stop reason: HOSPADM

## 2024-09-01 RX ADMIN — TRAZODONE HYDROCHLORIDE 50 MG: 50 TABLET ORAL at 21:41

## 2024-09-01 RX ADMIN — FLUOXETINE 40 MG: 20 CAPSULE ORAL at 21:38

## 2024-09-01 RX ADMIN — AMLODIPINE BESYLATE 10 MG: 5 TABLET ORAL at 21:38

## 2024-09-01 RX ADMIN — PROPRANOLOL HYDROCHLORIDE 60 MG: 60 CAPSULE, EXTENDED RELEASE ORAL at 21:39

## 2024-09-01 RX ADMIN — ACETAMINOPHEN 325MG 650 MG: 325 TABLET ORAL at 10:44

## 2024-09-01 RX ADMIN — SPIRONOLACTONE 50 MG: 25 TABLET ORAL at 21:38

## 2024-09-01 RX ADMIN — BUPROPION HYDROCHLORIDE 75 MG: 75 TABLET, FILM COATED ORAL at 09:33

## 2024-09-01 RX ADMIN — LORAZEPAM 0.5 MG: 0.5 TABLET ORAL at 09:33

## 2024-09-01 ASSESSMENT — ACTIVITIES OF DAILY LIVING (ADL)
ADLS_ACUITY_SCORE: 36

## 2024-09-01 NOTE — PROGRESS NOTES
Triage & Transition Services, Extended Care     Client Name: Lora Cardenas    Date: August 31, 2024    Patient was seen    Mode of Assessment:      Service Type: (P) declined to attend group session  Session Start Time:  (P) 2130    Session End Time: (P) 2150  Session Length: (P) 20  Site Location: [facility] EMERGENCY DEPT                             EMP03  Total Number ofAttendees: (P) 2  Topic:   (P) coping skills/lifestyle management, self-care activities   Response:       Beverly Kruger   Licensed Mental Health Professional (LMHP), Extended Care  [phone]

## 2024-09-01 NOTE — TELEPHONE ENCOUNTER
S: [facility] ED , DEC  Beverly  calling at 8:04 PM about a 48 year old/Female presenting with SI w/ Plan     B: Pt arrived via Family. Presenting problem, stressors: Pt reports primary stressor is her son recently wrote her a letter reports he doesn't want contact with her anymore.  Pt reports increasing dep and SI since then. Pt reports she was researching amounts to overdose on meds online.      Pt affect in ED: Depressed and Tearful  Pt Dx: Major Depressive Disorder and Generalized Anxiety Disorder  Previous IPMH hx? No  Pt endorses SI with a plan to overdose    Hx of suicide attempt? No  Pt denies SIB  Pt denies HI   Pt denies hallucinations .   Pt RARS Score: 3    Hx of aggression/violence, sexual offenses, legal concerns, Epic care plan? describe: no  Current concerns for aggression this visit? No  Does pt have a history of Civil Commitment? No  Is Pt their own guardian? Yes    Pt is prescribed medication. Is patient medication compliant? Yes  Pt denies OP services   CD concerns: None  Acute or chronic medical concerns: high blood pressure  Does Pt present with specific needs, assistive devices, or exclusionary criteria? None      Pt is ambulatory  Pt is able to perform ADLs independently      A: Pt to be reviewed for [facility] admission. Pt is Voluntary  Preferred placement: [facility]    COVID Symptoms: No  If yes, COVID test required   Utox: Ordered, not yet collected   CMP: Ordered, not yet collected   CBC: Ordered, not yet collected   HCG: N/A    R: Patient cleared and ready for behavioral bed placement: Yes  Pt placed on [facility] worklist? Yes    Does Patient need a Transfer Center request created? Yes, writer completed Transfer Center request at: 8:12 PM    R: [facility] Access Inpatient Bed Call Log  8/31/2024 3:24 PM  Intake has called facilities that have not updated their bed status within the last 12 hours.??      ADULTS:     *[facility]  [place] -- [facility]: @ Cap per website.  [place] -- [facility]:  @  Cap per website.    [place] -- [facility]: @ Cap per website.  [place] -- [facility]: @ Cap per website. - 3:30 PM Per Jayde, they are able to review low acuity.   [place] -- [facility]: @ Cap per website.  [facility] -- [facility]: @ Cap per website.   [facility]rina Tobar -- [facility] beds @ posting 3 beds. Ages 18-35, Voluntary only, COVID test req'd, NO aggression, physical or sexual assault, violence hx or drug abuse, or psychosis   [place] -- [facility]: @ Cap per website.  [place] -- [facility]: @ cap per website.  [place] -- [facility]:  @ Posting 0 beds.

## 2024-09-01 NOTE — PROGRESS NOTES
"  Triage & Transition Services, Extended Care     Therapy Progress Note    Patient: Lora goes by \"Lora,\" uses she/her pronouns  Date of Service: September 1, 2024  Site of Service: [facility] EMERGENCY DEPT                             EMP03  Patient was seen yes  Mode of Assessment: In person    Presentation Summary: Pt presented with a depressed and tearful affect. Pts mood was congruent with this presentation. Pt was oriented x4. Pt endorsed low mood, and thoughts of worry \"about what is next.\" Pt was agreeable to IP  admission. Pt endorsed more passive thoughts of SI today but a high level of distress from this sx. Pt did not endorse any SIB/HI or AH/VH. Pt asked questions about plans after hospitalization and what her treatment options are, writer discussed PHP/ programmatic care and Pt was somewhat receptive to this and willing to discuss futher safety planning throughout her hospitalization.    Therapeutic Intervention(s) Provided: Engaged in cognitive restructuring/ reframing, looked at common cognitive distortions and challenged negative thoughts., Engaged in guided discovery, explored patient's perspectives and helped expand them through socratic dialogue., Reviewed healthy living that supports positive mental health, including looking at sleep hygiene, regular movement, nutrition, and regular socialization., Identified and practiced coping skills., Coached on coping techniques/relaxation skills to help improve distress tolerance and managing intense emotions.    Current Symptoms: anxious sadness, negativistic, thoughts of death/suicide, low self esteem anxious   loss of appetite    Mental Status Exam   Affect: Appropriate  Appearance: Appropriate  Attention Span/Concentration: Attentive  Eye Contact: Engaged    Fund of Knowledge: Appropriate   Language /Speech Content: Fluent  Language /Speech Volume: Normal  Language /Speech Rate/Productions: Normal  Recent Memory: Intact  Remote " Memory: Intact  Mood: Depressed  Orientation to Person: Yes   Orientation to Place: Yes  Orientation to Time of Day: Yes  Orientation to Date: Yes     Situation (Do they understand why they are here?): Yes  Psychomotor Behavior: Normal  Thought Content: Suicidal  Thought Form: Intact    Treatment Objective(s) Addressed: rapport building, orienting the patient to therapy, identifying treatment goals, assessing safety, exploring obstacles to safety in the community, identifying additional supports, identifying an appropriate aftercare plan    Patient Response to Interventions: acceptance expressed, verbalizes understanding    Progress Towards Goals: Patient Reports Symptoms Are: ongoing  Patient Progress Toward Goals: is making progress  Comment: Pt has been receptive to ED interventions.  Next Step to Work Toward Discharge: symptom stabilization  Symptom Stabilization Comment: Pt endorsed passive SI and ongoing emotional distress    Case Management: Summary of Interaction: none at time of assessment    Plan: inpatient mental health  yes provider, RN         Clinical Substantiation: At this time IP MH admission is being recommended due to ongoing depressive sx and SI. Pts current sx appear to be impacting her ability to safety and appropriately function in the community. Pt was not able to fully safety plan with writer. Pt does appear to be at higher risk of death by suicide accidental or intentional due to mental health hx. If Pt is able to effectively safety plan and/or Pts sx improve it would be beneficial to pursue a less restrictive alternative.    Legal Status: Legal Status at Admission: Voluntary/Patient has signed consent for treatment    Session Status: Time session started: 1400  Time session ended: 1418  Session Duration (minutes): 18 minutes  Session Number: 1  Anticipated number of sessions or this episode of care: 3    Time Spent: 18 minutes    CPT Code: CPT Codes: 10023 - Psychotherapy (with patient) -  30 (16-37*) min    Diagnosis:   Patient Active Problem List   Diagnosis Code    Radiculopathy, lumbosacral region M54.17    Herniated lumbar intervertebral disc M51.26    YOAN (obstructive sleep apnea) G47.33    Severe episode of recurrent major depressive disorder, without psychotic features (H) F33.2    Menopausal symptoms N95.1    Major depressive disorder, recurrent, moderate (H) F33.1    Hyperlipidemia E78.5    History of 2019 novel coronavirus disease (COVID-19) Z86.16    Generalized anxiety disorder F41.1    Essential hypertension I10       Primary Problem This Admission: Active Hospital Problems    Severe episode of recurrent major depressive disorder, without psychotic features (H)        Stacie Conklin, [facility]   Licensed Mental Health Professional (LMHP), Extended Care  [phone]

## 2024-09-01 NOTE — PROGRESS NOTES
Pt appears to be sleeping in recliner 3. Wearing noise cancelling headphones, blanket pulled to chest.  Chest rise and fall noted, respirations even and unlabored.

## 2024-09-01 NOTE — CONSULTS
Diagnostic Evaluation Consultation  Crisis Assessment    Patient Name: Lora Cardenas  Age:  48 year old  Legal Sex: female  Gender Identity: female  Pronouns:   Race: White  Ethnicity: Not  or   Language: English      Patient was assessed: In person   Crisis Assessment Start Date: 24  Crisis Assessment Start Time: 1523  Crisis Assessment Stop Time: 161  Patient location: [facility] EMERGENCY DEPT                             [id number]    Referral Data and Chief Complaint  Lora Cardenas presents to the ED with family/friends. Patient is presenting to the ED for the following concerns: Depression, Suicidal ideation, Worsening psychosocial stress (Psychosis).   Factors that make the mental health crisis life threatening or complex are:  Patient is a 48 year old woman with a history notable for anxiety and depression. In the last year, she has dealt with multiple stressors, including death of her father-in-law whom she quit her job to care for as well as several interpersonal conflicts.  Most recently she received a letter by text from her 28 year old son on Monday this week where he told her that he did not want anything to do with her. He spelled out all the reasons for why he no longer wanted contact with her and her  (his step dad) in this note. Her son made statement such as wishing that she had an  with him and wishing that she would be dead. Patient explained that her 19-year-old son has also read this letter and is agreeing with his older brother on many of these things. Since this time, patient has been feeling very depressed and suicidal. She explained that if this is how her sons truly see her, she does not want to be alive. Patient shared that she has not been able to sleep or eat. She was feeling particularly suicidal on Thursday and was thinking about taking hydroxyzine to end her life. Her friend walked in at that time and she told her friend about her  thoughts and handed over her medication to her friend. Patient maintains suicidal ideation in the ED today and is feeling hopeless about her situation. She explained that she just wants to her pain to end.      Informed Consent and Assessment Methods  Explained the crisis assessment process, including applicable information disclosures and limits to confidentiality, assessed understanding of the process, and obtained consent to proceed with the assessment.  Assessment methods included conducting a formal interview with patient, review of medical records, collaboration with medical staff, and obtaining relevant collateral information from family and community providers when available.  : done     Patient response to interventions: acceptance expressed  Coping skills were attempted to reduce the crisis:  PT has been talking to her , her friend and she called [phone].     History of the Crisis   Patient denied any past higher level of care treatment. Patient did endorse past suicidal ideation, but denied any past suicide attempts. Pt has seen a therapist in the past, but it has been more than 6 months since their last session. She had made an appointment with a new therapist at [facility] for this Tuesday. This appointment while she was on a call with [email or web address]   when she reached out for support.    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.)  Source of Income:  salary/wages  Financial Environmental Concerns:  none  Current Hobbies:  cooking/baking, reading, music, other (see comments), outdoor activities (Prayer)  Barriers in Personal Life:  mental health concerns    Significant Clinical History  Current Anxiety Symptoms:  excessive worry  Current Depression/Trauma:  withdrawl/isolation, negativistic, crying or feels like  crying, low self esteem, helplessness, hopelessness, sadness, excessive guilt, thoughts of death/suicide  Current Somatic Symptoms:  excessive worry  Current Psychosis/Thought Disturbance:     Current Eating Symptoms:  loss of appetite  Chemical Use History:  Alcohol: Social  Benzodiazepines: None  Opiates: None  Cocaine: None  Marijuana: None  Other Use: None   Past diagnosis:  Depression, Anxiety Disorder  Family history:  Anxiety Disorder, Depression  Past treatment:  Individual therapy  Details of most recent treatment:  PCP does medication management.  Other relevant history:  Pt last saw a therapist 6 months ago.       Collateral Information  Is there collateral information: Yes     Collateral information name, relationship, phone number:   Juliocesar [phone]    What happened today: Patient s  collaborated patients report and shared about multiple reasons, stressors, most recently, the notes her oldest son sent her. Juliocesar reported the patient has been feeling devastated since getting this letter and has been making suicidal comments.     What is different about patient's functioning: He explained that she has not been able to sleep and as always up. She has been isolating and pulling away from everybody. Normally, she is extroverted by nature so this is very unusual for her. She likes to cook normally, but she is not doing this.     Concern about alcohol/drug use:  No     What do you think the patient needs:  Juliocesar reported that he thinks his wife needs serious counseling to get through to the core of what s going on in her heart and mind. He also suggest said that she needs medication to help her moderate her emotions.    Has patient made comments about wanting to kill themselves/others: yes    If d/c is recommended, can they take part in safety/aftercare planning:  yes    Additional collateral information:  None      Risk Assessment  [place] Suicide Severity Rating Scale Full Clinical  Version:  Suicidal Ideation  Q1 Wish to be Dead (Lifetime): Yes  Q2 Non-Specific Active Suicidal Thoughts (Lifetime): Yes  3. Active Suicidal Ideation with any Methods (Not Plan) Without Intent to Act (Lifetime): Yes  Q4 Active Suicidal Ideation with Some Intent to Act, Without Specific Plan (Lifetime): Yes  Q5 Active Suicidal Ideation with Specific Plan and Intent (Lifetime): Yes  Q6 Suicide Behavior (Lifetime): no     Suicidal Behavior (Lifetime)  Actual Attempt (Lifetime): No  Has subject engaged in non-suicidal self-injurious behavior? (Lifetime): No  Interrupted Attempts (Lifetime): No  Aborted or Self-Interrupted Attempt (Lifetime): No  Preparatory Acts or Behavior (Lifetime): Yes  Total Number of Preparatory Acts (Lifetime): 1  Preparatory Acts or Behavior Description (Lifetime): Pt researched how many hydroxyzine she needed to take to end her life.    [place] Suicide Severity Rating Scale Recent:   Suicidal Ideation (Recent)  Q1 Wished to be Dead (Past Month): yes  Q2 Suicidal Thoughts (Past Month): yes  Q3 Suicidal Thought Method: yes  Q4 Suicidal Intent without Specific Plan: yes  Q5 Suicide Intent with Specific Plan: yes  Level of Risk per Screen: high risk  Intensity of Ideation (Recent)  Most Severe Ideation Rating (Past 1 Month): 4  Frequency (Past 1 Month): Many times each day  Duration (Past 1 Month): 1-4 hours/a lot of time  Controllability (Past 1 Month): Can control thoughts with a lot of difficulty  Deterrents (Past 1 Month): Uncertain that deterrents stopped you  Reasons for Ideation (Past 1 Month): Mostly to end or stop the pain (You couldn't go on living with the pain or how you were feeling)  Suicidal Behavior (Recent)  Actual Attempt (Past 3 Months): No  Has subject engaged in non-suicidal self-injurious behavior? (Past 3 Months): No  Interrupted Attempts (Past 3 Months): No  Aborted or Self-Interrupted Attempt (Past 3 Months): No  Preparatory Acts or Behavior (Past 3 Months): Yes  Total  Number of Preparatory Acts (Past 3 Months): 1  Preparatory Acts or Behavior Description (Past 3 Months): Pt researched how many hydroxyzine she would need to take to end her life.    Environmental or Psychosocial Events: challenging interpersonal relationships  Protective Factors: Protective Factors: strong bond to family unit, community support, or employment, intact marriage or domestic partnership, responsibilities and duties to others, including pets and children, lives in a responsibly safe and stable environment, sense of importance of health and wellness, able to access care without barriers, supportive ongoing medical and mental health care relationships, help seeking, good impulse control, good problem-solving, coping, and conflict resolution skills, cultural, spiritual , or Orthodoxy beliefs associated with meaning and value in life, constructive use of leisure time, enjoyable activities, resilience, reality testing ability    Does the patient have thoughts of harming others? Feels Like Hurting Others: no  Previous Attempt to Hurt Others: no  Is the patient engaging in sexually inappropriate behavior?: no    Is the patient engaging in sexually inappropriate behavior?  no        Mental Status Exam   Affect: Appropriate  Appearance: Appropriate  Attention Span/Concentration: Attentive  Eye Contact: Engaged    Fund of Knowledge: Appropriate   Language /Speech Content: Fluent  Language /Speech Volume: Normal  Language /Speech Rate/Productions: Normal  Recent Memory: Intact  Remote Memory: Intact  Mood: Depressed  Orientation to Person: Yes   Orientation to Place: Yes  Orientation to Time of Day: Yes  Orientation to Date: Yes     Situation (Do they understand why they are here?): Yes  Psychomotor Behavior: Normal  Thought Content: Clear  Thought Form: Intact     Mini-Cog Assessment  Number of Words Recalled:    Clock-Drawing Test:     Three Item Recall:    Mini-Cog Total Score:       Medication  Psychotropic  "medications:   Medication Orders - Psychiatric (From admission, onward)      Start     Dose/Rate Route Frequency Ordered Stop    09/01/24 0800  buPROPion (WELLBUTRIN) tablet 75 mg         75 mg Oral DAILY 08/31/24 1958 08/31/24 2005  FLUoxetine (PROzac) capsule 40 mg         40 mg Oral EVERY EVENING 08/31/24 1958 08/31/24 1959  traZODone (DESYREL) half-tab 25 mg        Placed in \"Or\" Linked Group    25 mg Oral AT BEDTIME PRN 08/31/24 1959 08/31/24 1959  traZODone (DESYREL) tablet 50 mg        Placed in \"Or\" Linked Group    50 mg Oral AT BEDTIME PRN 08/31/24 1959 08/31/24 1958  LORazepam (ATIVAN) tablet 0.5 mg         0.5 mg Oral EVERY 4 HOURS PRN 08/31/24 1959               Current Care Team  Patient Care Team:  Shelley Méndez MD as PCP - General (Family Medicine)  Candi Lopez MD as MD (Ophthalmology)  Candi Lopez MD as Assigned Surgical Provider    Diagnosis  Patient Active Problem List   Diagnosis Code    Radiculopathy, lumbosacral region M54.17    Herniated lumbar intervertebral disc M51.26    YOAN (obstructive sleep apnea) G47.33    Severe episode of recurrent major depressive disorder, without psychotic features (H) F33.2    Menopausal symptoms N95.1    Major depressive disorder, recurrent, moderate (H) F33.1    Hyperlipidemia E78.5    History of 2019 novel coronavirus disease (COVID-19) Z86.16    Generalized anxiety disorder F41.1    Essential hypertension I10       Primary Problem This Admission  Active Hospital Problems    Severe episode of recurrent major depressive disorder, without psychotic features (H)        Clinical Summary and Substantiation of Recommendations   Patient is a 48 year old woman with a history notable for anxiety and depression. Most recently she received a letter by text from her 28 year old son on Monday this week where he told her that he did not want anything to do with her. He spelled out all the reasons for why he no longer wanted " contact with her and her  (his step dad) in this note. Her son made statement such as wishing that she had an  with him and wishing that she would be dead. Patient explained that her 19-year-old son has also read this letter and is agreeing with his older brother on many of these things. Since this time, patient has been feeling very depressed and suicidal. She explained that if this is how her sons truly see her, she does not want to be alive. Patient shared that she has not been able to sleep or eat. She was feeling particularly suicidal on Thursday and was thinking about taking hydroxyzine to end her life. Her friend walked in at that time and she told her friend about her thoughts and handed over her medication to her friend. Patient maintains suicidal ideation in the ED today and is feeling hopeless about her situation. She explained that she just wants to her pain to end.         It is the recommendation of this clinician that the patient be admitted to inpatient mental health care for further treatment, stabilization and safety. Attempts at managing mental health symptoms and maintaining safety at a lower level of care have been unsuccessful. Patient s current mental health symptoms are requiring a secure setting due to: pt is endorsing suicidal ideation with a plan and intent.       Imminent risk of harm: Suicidal Behavior  Severe psychiatric, behavioral or other comorbid conditions are appropriate for management at inpatient mental health as indicated by at least one of the following: Psychiatric Symptoms  Severe dysfunction in daily living is present as indicated by at least one of the following: Complete withdrawal from all social interactions  Situation and expectations are appropriate for inpatient care: Biopsychosocial stresses potentially contributing to clinical presentation (co morbidities) have been assessed and are absent or manageable at proposed level of care  Inpatient mental  health services are necessary to meet patient needs and at least one of the following: Specific condition related to admission diagnosis is present and judged likely to further improve at proposed level of care      Patient coping skills attempted to reduce the crisis:  PT has been talking to her , her friend and she called [phone].    Disposition  Recommended disposition: Inpatient Mental Health        Reviewed case and recommendations with attending provider. Attending Name: Dr. Burt       Attending concurs with disposition: yes       Patient and/or validated legal guardian concurs with disposition:   yes       Final disposition:  inpatient mental health    Legal status on admission: Voluntary/Patient has signed consent for treatment    Assessment Details   Total duration spent with the patient: 55 min     CPT code(s) utilized: 46095 - Psychotherapy for Crisis - 60 (30-74*) min    Beverly Kruger Psychotherapist  [facility] - Triage & Transition Services  Callback: [phone]

## 2024-09-01 NOTE — PLAN OF CARE
Lora L Ronald  2024  Plan of Care Hand-off Note     Patient Care Path: inpatient mental health    Plan for Care:   Patient is a 48 year old woman with a history notable for anxiety and depression. Most recently she received a letter by text from her 28 year old son on Monday this week where he told her that he did not want anything to do with her. He spelled out all the reasons for why he no longer wanted contact with her and her  (his step dad) in this note. Her son made statement such as wishing that she had an  with him and wishing that she would be dead. Patient explained that her 19-year-old son has also read this letter and is agreeing with his older brother on many of these things. Since this time, patient has been feeling very depressed and suicidal. She explained that if this is how her sons truly see her, she does not want to be alive. Patient shared that she has not been able to sleep or eat. She was feeling particularly suicidal on Thursday and was thinking about taking hydroxyzine to end her life. Her friend walked in at that time and she told her friend about her thoughts and handed over her medication to her friend. Patient maintains suicidal ideation in the ED today and is feeling hopeless about her situation. She explained that she just wants to her pain to end.               It is the recommendation of this clinician that the patient be admitted to inpatient mental health care for further treatment, stabilization and safety. Attempts at managing mental health symptoms and maintaining safety at a lower level of care have been unsuccessful. Patient s current mental health symptoms are requiring a secure setting due to: pt is endorsing suicidal ideation with a plan and intent.    Identified Goals and Safety Issues: Safety and stabilization    Overview:  Maria C Gandara [phone]            Legal Status: Legal Status at Admission: Voluntary/Patient has signed consent for  treatment    Psychiatry Consult: Pt is on [facility]        Updated MD and RN regarding plan of care.           Beverly Kruger

## 2024-09-01 NOTE — ED PROVIDER NOTES
Telemedicine Visit: The patient's condition can be safely assessed and treated via synchronous audio and visual telemedicine encounter.      Reason for Telemedicine Visit: Patient required immediate assessment / treatment     Originating Site (Patient Location): [facility]: [facility]    Distant Location (provider location):  Off-site    Consent:  The patient/guardian has verbally consented to: the potential risks and benefits of telemedicine (video visit) versus in person care; bill my insurance or make self-payment for services provided; and responsibility for payment of non-covered services.     Mode of Communication:  Video Conference via [employer]    As the provider I attest to compliance with applicable laws and regulations related to telemedicine.      I spent 70 minutes on direct patient care today,  including both face-to-face (6:45p-7:35p) and non-face-to-face time (20 minutes), such as reviewing labs/reports, ordering tests, and documenting clinical information.     [facility] Unit - Psychiatric Consultation  [facility] Emergency Department    Lora Cardenas MRN: [id number]   Age: 48 year old YOB: 1975     History     Chief Complaint   Patient presents with    Suicidal     HPI  Lora Cardenas is a 48 year old female with history notable for psychiatric history of depression and anxiety presenting with aborted/interrupted suicide attempt, worsening depression, worsening anxiety, severe insomnia in the context of increased psychosocial stressors.  Patient is approaching 6 hours in the emergency room.   Pt was medically evaluated and cleared in the emergency room prior to transfer to the EmPATH unit.  Any labs and imaging completed in the emergency room have been reviewed.     Patient with long history of depression and anxiety dating back to adolescence.  Patient reports first experiencing depression and anxiety at 12 years old and asking her mother for mental health  "help.  [place] and her mother worked in mental health and so mother did not want patient to seek any help where they lived.  Patient first sought professional mental health care after she had her first child at age 20.  Her primary care provider started fluoxetine and patient went on and off this medication for a handful of years.  About 4 years ago patient reports \"a big breakdown\" and starting sertraline for quite a while.  Patient had been experiencing some significant GI issues and so sertraline was discontinued.  This past spring fluoxetine and Wellbutrin were started.  Patient also reports taking propranolol for blood pressure and anxiety.  She states hydroxyzine has only been sort of effective for sleep.  She does feel the medications have given her \"more control over her reactions.\"    Regarding the events leading up to hospitalization, patient has had increasingly challenging relationships with those close to her.  Patient lost her father-in-law in May of this year after caring for him for quite some time, many months.  Patient has had recent conflict with mother-in-law which is very unusual in their 25-year relationship.  Patient and  and 3 sons recently went on a 10-day trip to [place].  Patient felt the trip went very well.  However, this past Monday her 28-year-old son wrote patient a scathing letter stating he wanted no contact.  Patient does report her son struggles with heavy cannabis use and mental health struggles.  Unfortunately patient's 19-year-old son saw the letter and apparently has been agreeing with the 28-year-old son.  Patient has a 16-year-old son with no current major conflict in the relationship.  The letter from the patient's son has been devastating.  For the past 3-4 days patient reports \"thinking of ending my life.\"  She decided she would overdose on her hydroxyzine.  Patient had a friend \"randomly\" stop by as she was seriously contemplating taking " her hydroxyzine and an overdose.  She trusted her friend enough to tell her about her planned overdose.  The friend helped come up with a safety plan, including taking possession of the hydroxyzine and ensuring the patient would call [phone] if she felt suicidal again.  Patient's  came home while the friend was there and so  was on board with safety planning as well.  They went out and bought Unisom and melatonin to help with sleep at night.  Patient reports only a little bit of sleep.  Today patient continued to struggle significantly.  There was a welfare call from someone from of the 988 hotline.  Based on patient's ongoing symptoms, suicidal ideation, and struggles, the person from [facility] recommended the patient be evaluated in the emergency room right away.  Patient currently endorses ongoing strong suicidal ideation.  No plan or intent to harm herself in the hospital.  However, she does not know how she is going to go home and move forward when feeling so poorly.  Patient has had decreased appetite, severe insomnia, racing thoughts, significant anxiety.  Patient also feeling hopeless, helpless, worthless.  Patient is experiencing extreme shame and guilt over recent events.  Patient's jasper is protective.  Patient does have a few supportive relationships that are protective.  No substance abuse.  Historically she has been in psychotherapy off and on.  No past psychiatric hospitalizations.  No past suicide attempts.  No self-harm behaviors.  There is a history of schizophrenia and bipolar disorder on her mother's side.  There is also substance abuse on her father's side.  Patient is also experiencing some significant menopause symptoms more recently and is currently on estrogen hormone replacement therapy.    See [facility] note in electronic healthcare record for additional information.    Past Medical History  Past Medical History:   Diagnosis Date    Asthma     EXERCISE INDUCED    Diabetes mellitus (H)      GESTATIONAL DIABETES    Hypertension     Strabismus      Past Surgical History:   Procedure Laterality Date    DAVINCI HYSTERECTOMY TOTAL  04/06/2012    Procedure:DAVINCI HYSTERECTOMY TOTAL; davinci total hysterectomy; Surgeon:CLEMENT RAYO; Location:[facility] OR    DISCECTOMY LUMBAR POSTERIOR MICROSCOPIC ONE LEVEL  12/11/2013    Procedure: DISCECTOMY LUMBAR POSTERIOR MICROSCOPIC ONE LEVEL;  MICRODISCECTOMY L5-S1  (MICROSCOPE, GOLDEN FRAME);  Surgeon: Dejan Gomez MD;  Location:  OR    ENT SURGERY      TONSILLECTOMY    EYE SURGERY  1973    congenital ET repair, [facility]    KNEE SURGERY      cartilage repair    ORTHOPEDIC SURGERY      left acl repair    RECESSION RESECTION (REPAIR STRABISMUS) BILATERAL Bilateral 8/2/2023    Procedure: BILATERAL STRABISMUS REPAIR;  Surgeon: Candi Lopez MD;  Location:  OR     amLODIPine (NORVASC) 10 MG tablet  buPROPion (WELLBUTRIN) 75 MG tablet  Cetirizine HCl (ZYRTEC ALLERGY PO)  fluticasone (FLONASE) 50 MCG/ACT nasal spray  hydrOXYzine (ATARAX) 25 MG tablet  multivitamin, therapeutic with minerals (THERA-VIT-M) TABS  VITAMIN D, CHOLECALCIFEROL, PO  estradiol (VIVELLE-DOT) 0.05 MG/24HR bi-weekly patch  propranolol ER (INDERAL LA) 60 MG 24 hr capsule  spironolactone (ALDACTONE) 50 MG tablet      Allergies   Allergen Reactions    Amoxicillin Anaphylaxis     Facial swelling, vomiting    Clindamycin Hives, Itching and Swelling    Fruit [Peach] Hives and Itching     Cherries, peaches, apples plumbs  Causes throat itching, hives    Nuts Itching    Penicillins Anaphylaxis, Itching and Swelling     Throat tightness    Pollen Extract Difficulty breathing     Itchy throat    Pollen Extract/Tree Extract Shortness Of Breath     Itchy throat    Prunus Persica Hives and Itching     Cherries, peaches, apples plumbs  Causes throat itching, hives     Family History  Family History   Problem Relation Age of Onset    Amblyopia Father     Respiratory Father          "YOAN    Hypertension Father     Hypertension Paternal Grandmother     Hypertension Brother     Diabetes No family hx of     Cerebrovascular Disease No family hx of     C.A.D. No family hx of     Glasses (<9 y/o) No family hx of      Social History   Social History     Tobacco Use    Smoking status: Never   Substance Use Topics    Alcohol use: Yes     Comment: twice a month    Drug use: Never          Review of Systems  A medically appropriate review of systems was performed with pertinent positives and negatives noted in the HPI, and all other systems negative.    Physical Examination   BP: (!) 160/102  Pulse: 57  Temp: 97.8  F (36.6  C)  Resp: 20  Height: 170.2 cm (5' 7\")  Weight: 113.4 kg (250 lb)  SpO2: 95 %    Physical Exam  General: Appears stated age.   Neuro: Alert and fully oriented. Extremities appear to demonstrate normal strength on visual inspection.   Integumentary/Skin: no rash visualized, normal color    Psychiatric Examination   Appearance: awake, alert, appeared as age stated, and slightly unkempt  Attitude:  cooperative  Eye Contact:  good  Mood:  anxious and depressed, sad  Affect:  mood congruent and very tearful  Speech:  clear, coherent  Psychomotor Behavior:  no evidence of tardive dyskinesia, dystonia, or tics  Thought Process:  logical, linear, and goal oriented  Associations:  no loose associations  Thought Content:  no evidence of psychotic thought, passive suicidal ideation present, plan for suicide present, no auditory hallucinations present, and no visual hallucinations present  Insight:  good  Judgement:  intact  Oriented to:  time, person, and place  Attention Span and Concentration:  intact  Recent and Remote Memory:  intact  Language: able to name/identify objects without impairment  Fund of Knowledge: intact with awareness of current and past events    ED Course     ED Course as of 08/31/24 2001   Sat Aug 31, 2024   1311 I obtained history and examined the patient as noted above.  "       Labs Ordered and Resulted from Time of ED Arrival to Time of ED Departure - No data to display    Assessments & Plan (with Medical Decision Making)   Patient presenting with severely worsening depression, anxiety, suicidal ideation in the context of increased psychosocial stressors.  Patient with increased psychosocial stressors and challenging relationships leading to new onset of intense suicidal ideation with a plan to overdose over the past several days.  Patient created a safety plan with her friend yesterday, including giving her hydroxyzine to her friend and calling [phone] if feeling intensely suicidal.  Continue to struggle with sleep last night and had ongoing intense thoughts of suicide today.  Patient is incredibly hopeless and distraught/dysphoric.  Reports ongoing intense suicidal ideation.  Patient not sure how to move forward from recent events.  Patient agreeable to increasing dose of fluoxetine.  Trazodone started at bedtime to help with sleep.  No substance use concerns.  No prior suicide attempts.  Patient agreeable to voluntary admission for safety and ongoing stabilization.  Nursing notes reviewed noting no acute issues.     I have reviewed the assessment completed by the [facility].     Preliminary diagnosis:    ICD-10-CM    1. Suicidal ideation  R45.851       MDD, Recurrent, Severe  Anxiety, Unspecified    Treatment Plan:  -Admit to inpatient psychiatry for safety and stabilization: Voluntary status  -Admit labs ordered  -Increase fluoxetine/Prozac to 40 mg daily for depression, anxiety.  Consider moving to the morning.  -Continue Wellbutrin immediate release 75 mg daily; moved to the morning.  -Continue propranolol ER 60 mg daily for anxiety.  -Start trazodone 25-50 mg at bedtime as needed for sleep.  -Start lorazepam/Ativan 0.5 mg every 4 hours as needed for anxiety.  -Continue to reassess daily while on the unit.    --  Tara Burt DO   [facility] EMERGENCY DEPT  EmPATH Unit  "    This note was created with voice recognition software. Inadvertent grammatical errors, typographical errors, and \"sound-a-like\" substitutions may occur due to limitations of the software.  Read the note carefully and apply context when erroneous substitutions have occurred. Thank you.        Tara Burt,   08/31/24 2225    "

## 2024-09-01 NOTE — TELEPHONE ENCOUNTER
R: [place]  Access Inpatient Bed Call Log 9/1/2024 7:47 AM   Intake has called facilities that have not updated the bed status within the last 12 hours.  -[facility] is at capacity            [facility] is posting 0 beds. [phone]    [facility] is posting 0 beds. Negative covid required   [facility] is posting 0 beds. Neg covid. No high school/[facility]-psych. [phone] Per call at 7:54a to [facility] at capacity.   [facility] is posting 0 beds. [phone]   [facility] is posting 0 beds. [phone]    [facility] is posting 3 beds. Negative covid. [phone] PT not appropriate d/t unit restrictions.   [facility] is posting 0 beds [phone]       Pt remains on the work list pending appropriate bed availability.

## 2024-09-01 NOTE — TELEPHONE ENCOUNTER
[place]: [place]  Access Inpatient Bed Call Log  9/1/24 @ 1:00am   Intake has called facilities that have not updated their bed status within the last 12 hours.     *METRO:  [place] -- [facility]: @ capacity.  [facility]: @ cap per website. Reporting no reviews overnight.  [place] -- [facility]: @ cap per website. Low acuity  [place] -- [facility]: @ cap per website. Low acuity only.  [place] -- [facility]: @ cap per website.  [facility]: @ cap per website.   [facility]/ beds: POSTING 3 BEDS. Ages 18-35, Voluntary only, NO aggression/physical/sexual assault, violence hx or drug abuse, or psychosis. Negative Covid  [place] [facility]: @ cap per website.  [place] -- [facility]: @ cap per website.  [place] -- [facility]: POSTING 2 BEDS. Do not review overnight.      Pt remains on waitlist pending appropriate placement availability

## 2024-09-01 NOTE — PROGRESS NOTES
Patient visible on the unit. Spent most of the day resting on her recliner. Pt verbalized how devastating it feels after getting the text from her son. Pt  requested a PRN for anxiety, Ativan was offered, and Trazodone for sleep. Pt still endorsing SI, but contracts for safety on the unit.

## 2024-09-01 NOTE — PROGRESS NOTES
"Pt slept well overnight but felt anxious upon waking. States \"my thoughts went right to a bad place\". PRN ativan given. She requested to journal and a notebook was provided. VSS. Provided with hygiene supplies and shower supplies, she hopes to take a shower later if feeling better. Pt continues to endorse SI though less intense than yesterday. Ate 100% of breakfast. Compliant with medications. Making needs known appropriately.   "

## 2024-09-01 NOTE — TELEPHONE ENCOUNTER
[place]: [place]  Access Inpatient Bed Call Log 9/1/2024 3:20PM   Intake has called facilities that have not updated the bed status within the last 12 hours.                               [facility] is at capacity.            [facility] is posting 0 beds. [phone]    [facility] is posting 0 beds. Negative covid required   [facility] is posting 0 beds. Neg covid. No high school/[place]-psych. [phone] Per call at 7:54a to [facility] at capacity.   [facility] is posting 0 beds. [phone]   [facility] is posting 0 beds. [phone]    [facility] is posting 4 beds. Negative covid. [phone]   [facility] is posting 0 beds [phone]

## 2024-09-02 ENCOUNTER — TELEPHONE (OUTPATIENT)
Dept: BEHAVIORAL HEALTH | Facility: CLINIC | Age: 49
End: 2024-09-02
Payer: COMMERCIAL

## 2024-09-02 VITALS
HEIGHT: 67 IN | RESPIRATION RATE: 16 BRPM | OXYGEN SATURATION: 96 % | DIASTOLIC BLOOD PRESSURE: 82 MMHG | BODY MASS INDEX: 39.24 KG/M2 | TEMPERATURE: 97.7 F | SYSTOLIC BLOOD PRESSURE: 132 MMHG | WEIGHT: 250 LBS | HEART RATE: 60 BPM

## 2024-09-02 PROCEDURE — 250N000013 HC RX MED GY IP 250 OP 250 PS 637: Performed by: PSYCHIATRY & NEUROLOGY

## 2024-09-02 PROCEDURE — 99239 HOSP IP/OBS DSCHRG MGMT >30: CPT | Performed by: NURSE PRACTITIONER

## 2024-09-02 RX ORDER — FLUOXETINE 40 MG/1
40 CAPSULE ORAL DAILY
Qty: 30 CAPSULE | Refills: 0 | Status: SHIPPED | OUTPATIENT
Start: 2024-09-02

## 2024-09-02 RX ORDER — LORAZEPAM 0.5 MG/1
0.5 TABLET ORAL DAILY PRN
Qty: 10 TABLET | Refills: 0 | Status: SHIPPED | OUTPATIENT
Start: 2024-09-02 | End: 2024-09-11

## 2024-09-02 RX ORDER — BUPROPION HYDROCHLORIDE 150 MG/1
150 TABLET ORAL EVERY MORNING
Qty: 30 TABLET | Refills: 0 | Status: SHIPPED | OUTPATIENT
Start: 2024-09-02

## 2024-09-02 RX ORDER — TRAZODONE HYDROCHLORIDE 50 MG/1
50 TABLET, FILM COATED ORAL AT BEDTIME
Qty: 30 TABLET | Refills: 0 | Status: SHIPPED | OUTPATIENT
Start: 2024-09-02

## 2024-09-02 RX ADMIN — BUPROPION HYDROCHLORIDE 75 MG: 75 TABLET, FILM COATED ORAL at 07:58

## 2024-09-02 RX ADMIN — LORAZEPAM 0.5 MG: 0.5 TABLET ORAL at 13:54

## 2024-09-02 RX ADMIN — ESTRADIOL 1 PATCH: 0.05 FILM, EXTENDED RELEASE TRANSDERMAL at 09:12

## 2024-09-02 ASSESSMENT — COLUMBIA-SUICIDE SEVERITY RATING SCALE - C-SSRS
2. HAVE YOU ACTUALLY HAD ANY THOUGHTS OF KILLING YOURSELF?: NO
SUICIDE, SINCE LAST CONTACT: NO
1. SINCE LAST CONTACT, HAVE YOU WISHED YOU WERE DEAD OR WISHED YOU COULD GO TO SLEEP AND NOT WAKE UP?: NO
TOTAL  NUMBER OF ABORTED OR SELF INTERRUPTED ATTEMPTS SINCE LAST CONTACT: NO
TOTAL  NUMBER OF INTERRUPTED ATTEMPTS SINCE LAST CONTACT: NO
6. HAVE YOU EVER DONE ANYTHING, STARTED TO DO ANYTHING, OR PREPARED TO DO ANYTHING TO END YOUR LIFE?: NO
ATTEMPT SINCE LAST CONTACT: NO

## 2024-09-02 ASSESSMENT — ACTIVITIES OF DAILY LIVING (ADL)
ADLS_ACUITY_SCORE: 36

## 2024-09-02 NOTE — ED NOTES
Patient agreeable to discharge plan. Discharge instructions reviewed with patient including follow-up care plan. Medications: Trazodone, Ativan, Fluoxetine and Wellbutrin were sent to pharmacy on [address]. Reviewed safety plan and outpatient resources. Denies SI and HI. All belongings that were brought into the hospital have been returned to patient. Escorted off the unit at 1540 accompanied by [facility] staff. Discharged to home via .

## 2024-09-02 NOTE — PROGRESS NOTES
Patient spent most of the evening resting in the recliner. Pt started working a puzzle but couldn't  finished it. She continues to feel hopeless, and sad regarding conflict with her son. Pt  dropped off new clothes to change into, she was very happy about it. Pt contract for safety on the unit.

## 2024-09-02 NOTE — ED PROVIDER NOTES
"[facility] Unit - Psychiatric Consultation  [facility] Emergency Department    Lora Cardenas MRN: [id number]   Age: 48 year old YOB: 1975     History     Chief Complaint   Patient presents with    Suicidal     HPI  Lora Cardenas is a 48 year old female with history notable for depression and anxiety who presented to the emergency department with worsening suicidal ideation, including thoughts of overdosing, and depression in the context of ongoing psychosocial stressors. Patient reports difficult relationship with adult son who recently sent her a text message informing her that he no longer wanted contact with her, this has been very distressing. Prior to coming to the ED, patient did give her friend her supply of medications to minimize access in addition to calling [phone]. In the emergency department, this patient was determined to be medically stable and transferred to the EmPATH unit for psychiatric assessment.  Patient was initially assessed at [facility] by Dr. Burt on 8/31/24 and fluoxetine was further optimized targeting depression and anxiety. Patient was voluntary for inpatient mental health admission. They are now nearing 32 hours in the emergency department. Overnight there were no acute issues.     Lora was observed to be participating in group in the milieu this evening.  She was agreeable to meet with me and accompanying me to a consult room for assessment.  She tells me that she continues to feel hopeless and sad regarding the situation with her son.  She also recalls several other significant losses and challenging relationships including conflict with her mother-in-law and the loss of her father-in-law.  She notes that these have been heavily weighing on her which is contributed to a feeling of \"loss \"and hopelessness.  She does report that since coming to [facility], her suicidal thoughts have decreased in intensity and are now passive.  She denies any specific plan and denies intent.  She " continues to have periods of increasing anxiety that she correlates to thinking about returning home and having to face continued conflict with her son or workplace stressors.  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  She did not sleep well last night however reports that she utilizes a CPAP at home that was not available on the unit.  Her  is bringing a CPAP to the hospital for her to use tonight.  She does report previously finding hydroxyzine helpful for sleep and anxiety and asks about trying this as needed throughout the day.  She has found Ativan helpful however is concerned about potential addictive properties and would prefer to minimize this use.  She denies any side effects to the increased fluoxetine dose. She denies AH/VH and HI. She is agreeable to continuing to wait for an inpatient mental health bed.     Past Medical History  Past Medical History:   Diagnosis Date    Asthma     EXERCISE INDUCED    Diabetes mellitus (H)     GESTATIONAL DIABETES    Hypertension     Strabismus      Past Surgical History:   Procedure Laterality Date    DAVINCI HYSTERECTOMY TOTAL  04/06/2012    Procedure:DAVINCI HYSTERECTOMY TOTAL; davinci total hysterectomy; Surgeon:CLEMENT RAYO; Location: OR    DISCECTOMY LUMBAR POSTERIOR MICROSCOPIC ONE LEVEL  12/11/2013    Procedure: DISCECTOMY LUMBAR POSTERIOR MICROSCOPIC ONE LEVEL;  MICRODISCECTOMY L5-S1  (MICROSCOPE, CHICO COLMENARES);  Surgeon: Dejan Gomez MD;  Location:  OR    ENT SURGERY      TONSILLECTOMY    EYE SURGERY  1973    congenital ET repair, [facility]    KNEE SURGERY      cartilage repair    ORTHOPEDIC SURGERY      left acl repair    RECESSION RESECTION (REPAIR STRABISMUS) BILATERAL Bilateral 8/2/2023    Procedure: BILATERAL STRABISMUS REPAIR;  Surgeon: Candi Lopez MD;  Location:  OR     amLODIPine (NORVASC) 10 MG  "tablet  buPROPion (WELLBUTRIN) 75 MG tablet  Cetirizine HCl (ZYRTEC ALLERGY PO)  fluticasone (FLONASE) 50 MCG/ACT nasal spray  hydrOXYzine (ATARAX) 25 MG tablet  multivitamin, therapeutic with minerals (THERA-VIT-M) TABS  VITAMIN D, CHOLECALCIFEROL, PO  estradiol (VIVELLE-DOT) 0.05 MG/24HR bi-weekly patch  propranolol ER (INDERAL LA) 60 MG 24 hr capsule  spironolactone (ALDACTONE) 50 MG tablet      Allergies   Allergen Reactions    Amoxicillin Anaphylaxis     Facial swelling, vomiting    Clindamycin Hives, Itching and Swelling    Fruit [Peach] Hives and Itching     Cherries, peaches, apples plumbs  Causes throat itching, hives    Nuts Itching    Penicillins Anaphylaxis, Itching and Swelling     Throat tightness    Pollen Extract Difficulty breathing     Itchy throat    Pollen Extract/Tree Extract Shortness Of Breath     Itchy throat    Prunus Persica Hives and Itching     Cherries, peaches, apples plumbs  Causes throat itching, hives     Family History  Family History   Problem Relation Age of Onset    Amblyopia Father     Respiratory Father         YOAN    Hypertension Father     Hypertension Paternal Grandmother     Hypertension Brother     Diabetes No family hx of     Cerebrovascular Disease No family hx of     C.A.D. No family hx of     Glasses (<9 y/o) No family hx of      Social History   Social History     Tobacco Use    Smoking status: Never   Substance Use Topics    Alcohol use: Yes     Comment: twice a month    Drug use: Never          Review of Systems  A medically appropriate review of systems was performed with pertinent positives and negatives noted in the HPI, and all other systems negative.    Physical Examination   BP: (!) 160/102  Pulse: 57  Temp: 97.8  F (36.6  C)  Resp: 20  Height: 170.2 cm (5' 7\")  Weight: 113.4 kg (250 lb)  SpO2: 95 %    Physical Exam  General: Appears stated age.   Neuro: Alert and fully oriented. Extremities appear to demonstrate normal strength on visual inspection. "   Integumentary/Skin: no rash visualized, normal color    Psychiatric Examination   Appearance: awake, alert, adequately groomed, appeared as age stated, and casually dressed  Attitude:  cooperative  Eye Contact:  good  Mood:  depressed  Affect:  mood congruent and intensity is flat  Speech:  clear, coherent and normal prosody  Psychomotor Behavior:  no evidence of tardive dyskinesia, dystonia, or tics and intact station, gait and muscle tone  Thought Process:  logical and linear  Associations:  no loose associations  Thought Content:  no evidence of psychotic thought, passive suicidal ideation present, and denies plan and intent   Insight:  good  Judgement:  good  Oriented to:  time, person, and place  Attention Span and Concentration:  intact  Recent and Remote Memory:  intact  Language: able to name/identify objects without impairment  Fund of Knowledge: intact with awareness of current and past events    ED Course     ED Course as of 09/01/24 2041   Sat Aug 31, 2024   1311 I obtained history and examined the patient as noted above.        Labs Ordered and Resulted from Time of ED Arrival to Time of ED Departure   COMPREHENSIVE METABOLIC PANEL - Abnormal       Result Value    Sodium 137      Potassium 4.2      Carbon Dioxide (CO2) 24      Anion Gap 13      Urea Nitrogen 14.2      Creatinine 0.89      GFR Estimate 79      Calcium 9.8      Chloride 100      Glucose 213 (*)     Alkaline Phosphatase 157 (*)     AST 32      ALT 39      Protein Total 7.0      Albumin 4.4      Bilirubin Total 0.4     URINE DRUG SCREEN PANEL - Abnormal    Amphetamines Urine Screen Negative      Barbituates Urine Screen Negative      Benzodiazepine Urine Screen Positive (*)     Cannabinoids Urine Screen Negative      Cocaine Urine Screen Negative      Fentanyl Qual Urine Screen Negative      Opiates Urine Screen Negative      PCP Urine Screen Negative     CBC WITH PLATELETS - Normal    WBC Count 7.0      RBC Count 5.16      Hemoglobin  14.9      Hematocrit 44.9      MCV 87      MCH 28.9      MCHC 33.2      RDW 12.8      Platelet Count 256         Assessments & Plan (with Medical Decision Making)   Patient presenting with worsening depression, suicidal ideation with thoughts of overdosing and anxiety in the context of increasing psychosocial stressors. Patient reports recent conflict with son and family losses which have been significant triggers. Treatment plan focused on further optimization of antidepressant medications while pursuing inpatient mental health stabilization, patient is voluntary. Should symptoms continue to improve, consider reassessment to determine if patient can be stabilized at a lower level of care. Nursing notes reviewed noting no acute issues.     I have reviewed the assessment completed by the [facility].     Preliminary diagnosis:    ICD-10-CM    1. Suicidal ideation  R45.851    Major depressive disorder, recurrent, severe  Anxiety         Treatment Plan:  -Continue increased fluoxetine to 40 mg daily for depression and anxiety.  Consider moving to the morning if patient continues to struggle with insomnia .  -Continue Wellbutrin immediate release 75 mg daily for antidepressant augmentation   -Continue propranolol ER 60 mg daily for anxiety.  -Increase Trazodone to 50mg at bedtime as needed, may repeat dose once, for sleep   -Continue lorazepam  0.5 mg every 4 hours as needed for anxiety, discussed short-term use of this while fluoxetine reaches therapeutic levels  -Patient care order for home CPAP use per unit routine   -Labs reviewed, largely unremarkable- UDS notable for benzo's- patient has received these in the ED  -Patient is voluntary for inpatient mental health admission. Should symptoms continue to improve, consider reassessment to determine if patient can be stabilized at lower level of care    --  DYAN Cisse [facility] EMERGENCY DEPT  EmPATH Unit       Cecilia Celaya APRN  CNP  09/01/24 2115

## 2024-09-02 NOTE — ED NOTES
Patient has been journaling and then organizing the shelves.  She states that she is very anxious and needs something to help her calm down.  She was given ativan.  She is still waiting to see the provider to go home.

## 2024-09-02 NOTE — TELEPHONE ENCOUNTER
R: METRO ONLY    [facility] Access Inpatient Bed Call Log 9/2/24 8:05 AM    Intake has called facilities that have not updated the bed status within the last 12 hours.                                           [facility] is at capacity.                       [facility] is posting 0 beds. [phone]              [facility] is posting 0 beds. Negative covid required.              [facility] is posting 0 beds. Neg covid. No high school/[facility]-psych.              [facility] is posting 0 beds. [phone]              [facility] is posting 0 beds. [phone] [place] [address] is posting 5 beds. Negative covid.               [facility] is posting 2 beds [phone]      Pt remains on the work list pending appropriate bed availability.

## 2024-09-02 NOTE — TELEPHONE ENCOUNTER
R: [place]  Access Inpatient Bed Call Log  9/2/24 @ 1:00am   Intake has called facilities that have not updated their bed status within the last 12 hours.     *METRO:  [place] -- [facility]: @ capacity.  [facility]: @ cap per website. Reporting no reviews overnight.  [place] -- [facility]: @ cap per website. Low acuity  [place] -- [facility]: @ cap per website. Low acuity only.  [place] -- [facility]: @ cap per website.  [facility]: @ cap per website.   [facility]/ beds: POSTING 4 BEDS. Ages 18-35, Voluntary only, NO aggression/physical/sexual assault, violence hx or drug abuse, or psychosis. Negative Covid -   [place] -- [facility]: @ cap per website.  [place] -- [facility]: @ cap per website.  [place] -- [facility]: @ cap per website. Do not review overnight.      Pt remains on waitlist pending appropriate placement availability

## 2024-09-02 NOTE — ED NOTES
Patient met with the [facility] and spoke with her  and feels that she is safe to go home.  She denies suicidal ideation and is pleasant and cooperative.  She is more hopeful for the future.

## 2024-09-02 NOTE — DISCHARGE INSTRUCTIONS
"Your Upcoming Appointment   IOP NAVIGATE PHONE CALL  You have been scheduled for a telephone appointment via telephone with the [facility] Addiction [facility] Navigation Hub. As a reminder, this is not an in-person appointment. A Navigator will contact you at the following telephone number [phone] at 9/3/2024 and 1:30PM and you can expect a 15-to-30-minute appointment. You will discuss programming options for the following program adult outpatient programs. Call [phone] between now and your appointment to contact the Navigation Hub.      Type: Telepsychiatry  Date: Thursday, 9/5/2024  Time: 1:00 pm - 2:00 pm  Provider: Penelope \"Rachel\" Lenny ALVES  CNP,PMRASHARDP,RN  Location: [facility], [address] [place] [id number]  Phone: [phone]  Patient Instructions: All Intake appointments will be conducted via telehealth and must have access to video through smart phone or laptop/pc/tablet. You will be contacted by our office to set up the virtual meeting. If you have questions, please contact our office at [phone].    Intensive Outpatient (IOP) Programs/Resources:       [facility]  Website: [email or web address]-disorder-php-iop  Phone: [phone]) [phone]  Location(s): [place] & [place]  About: 3 hours a day, 5 days a week.     [facility]  Website: [email or web address]  Phone: [phone]  Location(s): [place] & [place]  About:    Hours:  Youth & Adolescent: Monday - Friday, 9am - 12pm, 1pm - 4pm  Adult: Monday - Friday, 9:00 am to 12:00 pm or 1:00 to 4:00 pm   Length of Stay:   Youth & Adolescent: Average stay is 6-8 weeks  Young Adult: Average stay is 8 to 12 weeks  Adult: Average is 6 to 8 weeks    [facility]  Website: [email or web address]  Phone: [phone]) [phone]  Location(s): [place]  About: [facility] provides a variety of specialized programs to " meet your specific needs, including options such as High-Intensity Outpatient Programs, with or without lodging, Intensive Outpatient Programs (IOP), Outpatient Continuing Care Programs, Housing Stabilization, and Mental Health Services.      Online resources for parents of estranged adult children: (not vetted)  https://www.peacinternational.org/  [email or web address].org/

## 2024-09-02 NOTE — ED PROVIDER NOTES
[facility] Unit - Psychiatric Observation Discharge Summary  [facility] Emergency Department  Discharge Date: 9/2/2024    Lora Cardenas MRN: [id number]   Age: 48 year old YOB: 1975     Brief HPI & Initial ED Course     Chief Complaint   Patient presents with    Suicidal     HPI  Lora Cardenas is a 48 year old female with history notable for depression and anxiety who presented to the emergency department with worsening suicidal ideation, including thoughts of overdosing, and depression in the context of ongoing psychosocial stressors. Patient reports difficult relationship with adult son who recently sent her a text message informing her that he no longer wanted contact with her, this has been very distressing. Prior to coming to the ED, patient did give her friend her supply of medications to minimize access in addition to calling [phone]. In the emergency department, this patient was determined to be medically stable [facility].  Patient was initially assessed at [facility] by Dr. Burt on 8/31/24, placed on list for voluntary inpatient hospitalization, while fluoxetine was further optimized targeting depression and anxiety by increasing dose from 20 mg daily to 40 mg daily.  Yesterday, 09.01.24, psychiatric follow up performed by DYAN Cisse CNP where patient was showing some improvement, however remained on observation status while waiting for inpatient placement, which was later retracted as patient showed ongoing stability.  Patient is now nearing 50 hours in the ED setting. No acute issues overnight.    Today, 09.02.24, patient is being seen for follow up suicidal ideation. She indicates she is feeling much better today compared to yesterday. She indicates she slept well last night, has been talking to other patients and staff, learning the importance of self-care, and spending time reflecting and journaling to gain thought clarity. She found  "all of this therapeutic. She is tolerating dose increase of Prozac without notable side effects other than recalling a brief episode of GI upset this morning, which she notes could also have been contributed to drinking coffee on an empty stomach. We discuss her prescription of Wellbutrin SR 75 mg daily. Patient reports being on this dose for several months now. She is unsure why SR formulation is being utilized vs XL as she's been tolerating the medication well. Discussed benefit of gaining longer duration and therapeutic value,along with dose increase, if transitioned to Wellbutrin  mg daily. Patient interested in this option. Discussed ongoing utilization of ativan 0.5 mg as needed for break through anxiety as she received one dose today and one dose yesterday with good results. She is aware of addictive nature of medication and limitation to short term use of less than 14 days. She is interested in having the option at time of discharge while awaiting prozac and wellbutrin to reach therapeutic levels. She denies any suicidal thoughts. She is not having any symptoms of psychosis. She is feeling optimistic about returning home with recent medication adjustments and outpatient referrals in place.    Physical Examination   BP: 132/82  Pulse: 60  Temp: 97.7  F (36.5  C)  Resp: 16  Height: 170.2 cm (5' 7\")  Weight: 113.4 kg (250 lb)  SpO2: 96 %    Physical Exam  General: Appears stated age.   Neuro: Alert and fully oriented. Extremities appear to demonstrate normal strength on visual inspection.   Integumentary/Skin: no rash visualized, normal color    Psychiatric Examination   Appearance: awake, alert and adequately groomed  Attitude:  cooperative  Eye Contact:  good  Mood:  better  Affect:  intensity is normal  Speech:  clear, coherent  Psychomotor Behavior:  no evidence of tardive dyskinesia, dystonia, or tics and intact station, gait and muscle tone  Thought Process:  logical, linear, and goal " oriented  Associations:  no loose associations  Thought Content:  no evidence of suicidal ideation or homicidal ideation and no evidence of psychotic thought  Insight:  good  Judgement:  intact  Oriented to:  time, person, and place  Attention Span and Concentration:  intact  Recent and Remote Memory:  intact  Language: able to name/identify objects without impairment  Fund of Knowledge: intact with awareness of current and past events    Results     ED Course as of 09/02/24 1519   Sat Aug 31, 2024   1311 I obtained history and examined the patient as noted above.        Labs Ordered and Resulted from Time of ED Arrival to Time of ED Departure   COMPREHENSIVE METABOLIC PANEL - Abnormal       Result Value    Sodium 137      Potassium 4.2      Carbon Dioxide (CO2) 24      Anion Gap 13      Urea Nitrogen 14.2      Creatinine 0.89      GFR Estimate 79      Calcium 9.8      Chloride 100      Glucose 213 (*)     Alkaline Phosphatase 157 (*)     AST 32      ALT 39      Protein Total 7.0      Albumin 4.4      Bilirubin Total 0.4     URINE DRUG SCREEN PANEL - Abnormal    Amphetamines Urine Screen Negative      Barbituates Urine Screen Negative      Benzodiazepine Urine Screen Positive (*)     Cannabinoids Urine Screen Negative      Cocaine Urine Screen Negative      Fentanyl Qual Urine Screen Negative      Opiates Urine Screen Negative      PCP Urine Screen Negative     CBC WITH PLATELETS - Normal    WBC Count 7.0      RBC Count 5.16      Hemoglobin 14.9      Hematocrit 44.9      MCV 87      MCH 28.9      MCHC 33.2      RDW 12.8      Platelet Count 256         Observation Course   The patient was found to have a psychiatric condition that would benefit from an observation stay in the emergency department for further psychiatric stabilization and/or coordination of a safe disposition. The plan upon observation admission included serial assessments of psychiatric condition, potential administration of medications if indicated,  further disposition pending the patient's psychiatric course during the monitoring period.     Serial assessments of the patient's psychiatric condition were performed. Nursing notes were reviewed. During the observation period, the patient did not require medications for agitation, and did not require restraints/seclusion for patient and/or provider safety.     After a period of working with the treatment team on the EmPATH unit, the patient's mental state improved to allow a safe transition to outpatient care. After counseling on the diagnosis, work-up, and treatment plan, the patient was discharged. Close follow-up with a psychiatrist and/or therapist was recommended and community psychiatric resources were provided. Patient is to return to the ED if any urgent or potentially life-threatening concerns.     Discharge Diagnoses:   Final diagnoses:   Suicidal ideation   MDD (major depressive disorder), recurrent severe, without psychosis (H)   Anxiety       Treatment Plan:  -Discharge home with safety plan.  -Discontinue Wellbutrin SR 75 mg daily and start Wellbutrin  mg daily. Discussed waiting a week to make transition to avoid two medication changes as the same time as Prozac was increased during stay from 20 mg to 40 mg daily.  -Script for 10 tablets of Ativan 0.5 mg daily as needed for anxiety provided.  -Script for trazodone 50 mg nightly for insomnia provided as patient found this helpful during stay.  -Continue Inderal LA 60 mg nightly, which is prescribed for hypertension with additional benefit of managing anxiety and tremors.  -Medication education provided this visit includes, rationale for medication, importance of compliance, medication interactions, and common side effects. Patient agreeable.   -educated patient on diagnosis, prognosis, and treatment recommendations.  -Appointment with [facility] navigate 09.03.24 at 1330.  -Referral for psychiatry on 09.05.24 at 1300.      At the time of discharge,  the patient's acute suicide risk was determined to be low due to the following factors: Reduction in the intensity of mood/anxiety symptoms that preceded the admission, denial of suicidal thoughts, denies feeling helpless or helpless, not currently under the influence of alcohol or illicit substances, denies experiencing command hallucinations, no immediate access to firearms. The patient's acute risk could be higher if noncompliant with their treatment plan, medications, follow-up appointments or using illicit substances or alcohol. Protective factors include: social supports, children, stable housing, employment, [employer] beliefs.    I spent more than 30 minutes on discharge day activities.    --  DYAN Avalos CNP  [facility] EMERGENCY DEPT  EmPATH Unit       Melissa Camp APRN CNP  09/02/24 2020

## 2024-09-02 NOTE — PROGRESS NOTES
"Triage and Transition Services Extended Care Reassessment     Patient: Lora goes by \"Lora,\" uses she/her pronouns  Date of Service: September 2, 2024  Site of Service: [facility] EMERGENCY DEPT                             [id number]  Patient was seen yes  Mode of Assessment: In person     Reason for Reassessment:      History of Patient's Original Emergency Room Encounter: Pt presented to the ED with SI and a plan due to recent no contact letter from her oldest son. Patient denied any past higher level of care treatment. Patient did endorse past suicidal ideation, but denied any past suicide attempts. Pt has seen a therapist in the past, but it has been more than 6 months since their last session. She had made an appointment with a new therapist at [facility] for this Tuesday. This appointment while she was on a call with [email or web address]  when she reached out for support.    Current Patient Presentation: Pt denies SI at this time and is ready to discharge.    Presentation Summary: Pt reported that she has been doing a lot of journaling to get clarity on what is and is not true in terms of what her son said about her and her parenting in the letter. We explored holding on to what she knows about herself while also accepting feedback and influence regarding her son's experience. Pt reported that she wants to live and she wants to be there for her children. She is uncertain about next steps and if she still needs IP or not. Pt was given time to think about this and came back to say that she does feel ready to discharge. Pt is interested in program management and will get a navigator call tomorrow. Pt also requested a referral for psychiatry. We explored the process of navigating next steps and coping with the letter from her son. We explored what her son's needs may be at this time as well as pt's needs are. Pt denies current SI, SIB and HI as well as AH and VH.    Changes Observed Since Initial Assessment:  "     Therapeutic Interventions Provided: Engaged in safety planning, Explored motivation for discharge., Explored strategies for self-soothing., Explored and identified early warning signs to mental health crisis.    Current Symptoms: anxious sadness anxious   loss of appetite    Mental Status Exam   Affect: Appropriate  Appearance: Appropriate  Attention Span/Concentration: Attentive  Eye Contact: Engaged    Fund of Knowledge: Appropriate   Language /Speech Content: Fluent  Language /Speech Volume: Normal  Language /Speech Rate/Productions: Normal  Recent Memory: Intact  Remote Memory: Intact  Mood: Sad, Depressed  Orientation to Person: Yes   Orientation to Place: Yes  Orientation to Time of Day: Yes  Orientation to Date: Yes     Situation (Do they understand why they are here?): Yes  Psychomotor Behavior: Normal  Thought Content: Clear  Thought Form: Intact    Treatment Objective(s) Addressed: processing feelings, identifying and practicing coping strategies, safety planning, identifying an appropriate aftercare plan, assessing safety, identifying treatment goals, identifying additional supports    Patient Response to Interventions: eager to participate    Progress Towards Goals:  Patient Reports Symptoms Are: improving  Patient Progress Toward Goals: is making progress  Comment: Pt has been receptive to ED interventions.  Next Step to Work Toward Discharge:  (Pt will discharge today.)  Symptom Stabilization Comment: Pt denied current SI.    Case Management: Case Management Included: completing or following up on referrals  Details on completing or following up on referrals: Coordinator scheduled navigator call to discuss program intake. OP psychiatry appointment made.  Summary of Interaction: none at time of assessment    C-SSRS Since Last Contact:   1. Wish to be Dead (Since Last Contact): No  2. Non-Specific Active Suicidal Thoughts (Since Last Contact): No     Actual Attempt (Since Last Contact): No  Has  subject engaged in non-suicidal self-injurious behavior? (Since Last Contact): No  Interrupted Attempts (Since Last Contact): No  Aborted or Self-Interrupted Attempt (Since Last Contact): No  Preparatory Acts or Behavior (Since Last Contact): No  Suicide (Since Last Contact): No     Calculated C-SSRS Risk Score (Since Last Contact): No Risk Indicated    Plan: Final Disposition / Recommended Care Path: inpatient mental health  Plan for Care reviewed with assigned Medical Provider: yes  Plan for Care Team Review: provider  Comments: Melissa Camp  Patient and/or validated legal guardian concurs: yes  Clinical Substantiation: Pt reported that she has been doing a lot of journaling to get clarity on what is and is not true in terms of what her son said about her and her parenting in the letter. We explored holding on to what she knows about herself while also accepting feedback and influence regarding her son's experience. Pt reported that she wants to live and she wants to be there for her children. She is uncertain about next steps and if she still needs IP or not. Pt was given time to think about this and came back to say that she does feel ready to discharge. Pt is interested in program management and will get a navigator call tomorrow. Pt also requested a referral for psychiatry. We explored the process of navigating next steps and coping with the letter from her son. We explored what her son's needs may be at this time as well as pt's needs are. Pt denies current SI, SIB and HI as well as AH and VH. Pt no longer feels hopeless and is less tearful.              After the period in observation care, the patient's circumstances and mental state were safe for outpatient management. Pt no longer needs IP MH for safety and stabilization. After therapeutic treatment, intervention and aftercare planning by [facility] care team and consultation with attending provider, the patient was discharged. Close follow-up with a  psychiatrist and/or therapist was recommended and community psychiatric resources were provided. Patient is to return to the ED if any urgent or potentially life-threatening concerns arise.           At the time of discharge, the patient's acute suicide risk was determined to be low due to the following factors: reduction in the intensity of mood/anxiety symptoms that preceded the admission, denial of suicidal thoughts, denies feeling helpless or hopeless, not currently under the influence of alcohol or illicit substances, denies experiencing command hallucinations, and no immediate access to firearms. Protective factors include: social support, voluntarily seeking mental health support, displays resiliency , jasper system, future focused thinking, displays insight, expresses desire to engage in treatment, sense of obligation to people/pets, safe/stable housing, and fulfilling employment.    Legal Status: Legal Status at Admission: Voluntary/Patient has signed consent for treatment    Session Status: Time session started: 0800  Time session ended: 0900  Session Duration (minutes): 60 minutes  Session Number: 2  Anticipated number of sessions or this episode of care: 2    Session Start Time: 0800  Session Stop Time: 0900  CPT codes: 33202 - Psychotherapy (with patient) - 60 (53+*) min  Time Spent: 60 minutes      CPT code(s) utilized: 31162 - Psychotherapy (with patient) - 60 (53+*) min    Diagnosis:   Patient Active Problem List   Diagnosis Code    Radiculopathy, lumbosacral region M54.17    Herniated lumbar intervertebral disc M51.26    YOAN (obstructive sleep apnea) G47.33    Severe episode of recurrent major depressive disorder, without psychotic features (H) F33.2    Menopausal symptoms N95.1    Major depressive disorder, recurrent, moderate (H) F33.1    Hyperlipidemia E78.5    History of 2019 novel coronavirus disease (COVID-19) Z86.16    Generalized anxiety disorder F41.1    Essential hypertension I10        Primary Problem This Admission: Active Hospital Problems    Severe episode of recurrent major depressive disorder, without psychotic features (H)        Beverly Kruger   Licensed Mental Health Professional (LMHP), [facility]  [phone]

## 2024-09-02 NOTE — PROGRESS NOTES
Triage & Transition Services, Extended Care     Client Name: Lora Cardenas    Date: September 1, 2024    Patient was seen yes  Mode of Assessment: In person    Service Type: (P) attended group session  Session Start Time:  (P) 1940    Session End Time: (P) 2015  Session Length: (P) 35  Site Location: [facility] EMERGENCY DEPT                             EMP03  Total Number ofAttendees: (P) 3  Topic:   (P) coping skills/lifestyle management, self-care activities, cognitive therapy techniques   Response: (P) discussed personal experience with topic, listened actively, expressed understanding of topic, offered helpful suggestions to peers, able to recall/repeat info presented, cooperative with task     Beverly Kruger   Licensed Mental Health Professional (LMHP), Extended Care  [phone]

## 2024-09-03 ENCOUNTER — TELEPHONE (OUTPATIENT)
Dept: BEHAVIORAL HEALTH | Facility: CLINIC | Age: 49
End: 2024-09-03
Payer: COMMERCIAL

## 2024-09-03 PROBLEM — Z78.9 KNOWN HEALTH PROBLEMS: NONE: Status: ACTIVE | Noted: 2024-09-03

## 2024-09-03 NOTE — TELEPHONE ENCOUNTER
LVM for patient regarding follow up, left [facility] number if they would like additional assistance. No further follow-up is needed.

## 2024-09-04 ENCOUNTER — TELEPHONE (OUTPATIENT)
Dept: BEHAVIORAL HEALTH | Facility: CLINIC | Age: 49
End: 2024-09-04
Payer: COMMERCIAL

## 2024-09-04 ENCOUNTER — VIRTUAL VISIT (OUTPATIENT)
Dept: BEHAVIORAL HEALTH | Facility: CLINIC | Age: 49
End: 2024-09-04
Payer: COMMERCIAL

## 2024-09-04 ENCOUNTER — BEH TREATMENT PLAN (OUTPATIENT)
Dept: BEHAVIORAL HEALTH | Facility: CLINIC | Age: 49
End: 2024-09-04
Attending: PSYCHIATRY & NEUROLOGY

## 2024-09-04 DIAGNOSIS — F33.2 SEVERE RECURRENT MAJOR DEPRESSION WITHOUT PSYCHOTIC FEATURES (H): Primary | ICD-10-CM

## 2024-09-04 PROCEDURE — 90791 PSYCH DIAGNOSTIC EVALUATION: CPT | Mod: 95

## 2024-09-04 ASSESSMENT — ANXIETY QUESTIONNAIRES
6. BECOMING EASILY ANNOYED OR IRRITABLE: NEARLY EVERY DAY
GAD7 TOTAL SCORE: 18
2. NOT BEING ABLE TO STOP OR CONTROL WORRYING: NEARLY EVERY DAY
IF YOU CHECKED OFF ANY PROBLEMS ON THIS QUESTIONNAIRE, HOW DIFFICULT HAVE THESE PROBLEMS MADE IT FOR YOU TO DO YOUR WORK, TAKE CARE OF THINGS AT HOME, OR GET ALONG WITH OTHER PEOPLE: VERY DIFFICULT
3. WORRYING TOO MUCH ABOUT DIFFERENT THINGS: MORE THAN HALF THE DAYS
5. BEING SO RESTLESS THAT IT IS HARD TO SIT STILL: NEARLY EVERY DAY
1. FEELING NERVOUS, ANXIOUS, OR ON EDGE: MORE THAN HALF THE DAYS
GAD7 TOTAL SCORE: 18
7. FEELING AFRAID AS IF SOMETHING AWFUL MIGHT HAPPEN: MORE THAN HALF THE DAYS

## 2024-09-04 ASSESSMENT — PATIENT HEALTH QUESTIONNAIRE - PHQ9
5. POOR APPETITE OR OVEREATING: NEARLY EVERY DAY
SUM OF ALL RESPONSES TO PHQ QUESTIONS 1-9: 27

## 2024-09-04 NOTE — PROGRESS NOTES
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 outside, going for walks with my dog      Step 3: People and social settings that provide distraction:    Name Contact Information    My friends: naz Echevarria and Surekha     My  Juliocesar        Places    Nature, going for walks      Step 4: People whom I can ask for help during a crisis:    Name Contact Information    My freinds: Swathi Ledesma and Leslie       Step 5: Professionals or agencies I can contact during a crisis:    Clinician/Agency Name Phone Emergency Contact    New therapist: Joleen Myers psyhciatrist        Local Emergency Department Emergency Department Address Emergency Department Phone    COPE [phone]        Suicide Prevention Lifeline Phone: Call or Text 205  Crisis Text Line: Text HOME to 100146     Step 6: Making the environment safer (plan for lethal means safety):   My  will keep the medications and dispense.      Optional: What is most important to me and worth living for?:   I feel like I have  purpose to love and serve people.   I want to live for my family.      Jere Safety Plan. Laura Arechiga and Daron Arriaza. Used with permission of the  authors.           Provider Name/ Credentials:  Kristian Mathews M.S. [facility]  September 4, 2024

## 2024-09-04 NOTE — TELEPHONE ENCOUNTER
**Patient Navigator Follow Up**    9/4/2024;    Referral for IOP-ADT;  Schedule Preferences: Schedule Preference: Mornings Preferred     Comments: Pt [facility], but is open to afternoons also in [facility], no [place].         I called and discussed IOP programming options with patient.  She wants to go to the IOP1; mornings at [facility].  She is starting Tuesday, 9/10.    I added her to the census and sent in basket message to the program as well.    Inquired if she wants to sign up for [email or web address]. She said yes.  I sent her link via email (her preferred method).   She said she was currently at the grocery store but will work on activating it once she returns home    Once she activates I will send her Welcome Letter through there.      **Patient has now activated her [email or web address] account and I sent her Welcome Letter through there.    **Patient called [facility] with more questions she had this morning, 9/5.  I returned her call and answered her questions via phone. All set.    Thank you,    Mari LUCAS  Patient Navigator

## 2024-09-04 NOTE — TELEPHONE ENCOUNTER
Summary of Patient Care Communication Handoff to Patient Navigator Coordinator    PATIENT'S NAME: Lora Cardenas  MRN:   [id number]  :   1975    DATE OF SERVICE: 24    Referral Needed: Yes    Is the patient coming from an inpatient unit? No    What program is this referral for? Adult Mental Health Referral    Level of Care Recommended:  Combined Intensive Outpatient Day Treatment Program (IOP-ADT) / Adult Day Treatment Program (ADT)    Specialty Track Recommendations:  MH Specialty Tracks: General Mood Disorder    Schedule Preferences: Schedule Preference: Mornings Preferred    Are there any potential barriers for entrance into programmatic care? suicidal ideation    Followed up from  Needed?:  No    Mental Health Referral Needed: No    Release of Information Needed:  N/A    Faxing Needed: No    Follow up Requests:  Patient Navigator Coordinator Follow-Up Needed: Referral to designated [place] Program.    Comments: Pt prefers morning in [facility], but is open to afternoons also in [facility], no [place].    Kristian Mathews        Patient Navigator Coordinator Contact Information  Pool Message: [email or web address] ([phone])   Phone:  [phone]  Fax:  [phone]  Email:  [email or web address]

## 2024-09-05 ENCOUNTER — TELEPHONE (OUTPATIENT)
Dept: BEHAVIORAL HEALTH | Facility: CLINIC | Age: 49
End: 2024-09-05
Payer: COMMERCIAL

## 2024-09-05 NOTE — TELEPHONE ENCOUNTER
Patient called [facility]. Stated set to start IOP and needs clinic address and provider name to make sure that insurance will cover. Coordinator message navigators to see if call could be transferred. Mari stated would call patient back. Relayed to patient.    Tresa Vásquez  [facility] Coordinator  09/05/24 8:33 AM

## 2024-09-05 NOTE — TELEPHONE ENCOUNTER
----- Message from Mari H sent at 9/4/2024  3:11 PM CDT -----  Regarding: Referral for IOP-1; mornings  Adult Mental Health Programmatic Care Schedule Request    Patient Name: Lora Cardenas  Location of programming: [facility]  Start Date: 9/10/2024    Group/PROVIDER: ADMISSION IOP AM TRACK [[id number]]   Appt Time: 12pm   Duration of Appointment in minutes: 60 minutes   Visit Type: Treatment [870]   Attending Provider: Hudson Camarena     Adult Program Group: Adult Program Group: IOP 1 General Mood Track  [[id number]]  Schedule:  M, T, W, Th 9am- 12noon  12 hours per week for 9 weeks  Number of visits to be scheduled: 36 days    Attending Provider (MD):  Dr. Hudson Camarena ([place])  Visit Type:  In-Person    Accommodations Needed: No  Alerts Identified/Substantiation: No  Consulted with Supervisor: No

## 2024-09-06 NOTE — PROGRESS NOTES
"    Admission SBAR NOTE  Adult  Outpatient Programs          SITUATION:     Admission Date: 9/10/2024    Provider verified identity through the following two step process.  Patient provided: verbal spelling of full first and last name and Patient     Patient name:  Lora Cardenas  Preferred name: Lora She/Her/Hers/Herself 48 year old    Diagnosis/-es (copy from [employer], including ICD-10):    296.33 (F33.2) Major Depressive Disorder, Recurrent Episode, Severe _ and With anxious distress.    Assigned Program/Track: IOP 1    Reviewed patient's schedule and informed them of any variation due to holidays. yes    Does the patient have any planned absences and/or barriers to admission/treatment? Yes,  and  - work conference  NOTE: impact of transportation, technology, childcare, work, or housing concerns.    Insurance: Payor: [employer] / Plan: [employer] OPEN ACCESS / Product Type: HMO /  Changes/Concerns: no    Does patient need an appointment with the program provider? Yes - appt at 10 am   NOTE: If yes, please confirm/schedule provider visit.      BACKGROUND:     Patient's stated goal/reason for treatment (copy from ; confirm with patient): \" Depression and Anxiety \"      ASSESSMENT:     Please consult  if any of the following concerns may impact admission/participation in program:     PHQ, JANES and PROMIS done within 7 days OR send upon admission if over 7 days.      [place] Suicide Severity Rating (select Lifetime/Recent): [place] Suicide Severity Rating Scale (Lifetime/Recent)      2024    12:59 PM 2024     2:08 PM 2024     7:17 PM 2024     7:20 PM 2024     9:27 PM 2024     9:07 PM   [place] Suicide Severity Rating (Lifetime/Recent)   Q1 Wish to be Dead (Lifetime)   Yes      Q2 Non-Specific Active Suicidal Thoughts (Lifetime)   Yes      Q1 Wished to be Dead (Past Month) 1-->yes 1-->yes  1-->yes 1-->yes 1-->yes   Q2 Suicidal " Thoughts (Past Month) 1-->yes 1-->yes  1-->yes 1-->yes 1-->yes   Q3 Suicidal Thought Method 1-->yes 1-->yes  1-->yes 0-->no 0-->no   Q4 Suicidal Intent without Specific Plan 0-->no 0-->no  1-->yes 0-->no 0-->no   Q5 Suicide Intent with Specific Plan 0-->no 1-->yes  1-->yes 0-->no 0-->no   Q6 Suicide Behavior (Lifetime) 0-->no 0-->no 0-->no  0-->no 0-->no   Level of Risk per Screen moderate risk high risk  high risk low risk low risk   3. Active Suicidal Ideation with any Methods (Not Plan) Without Intent to Act (Lifetime)   Y      Q4 Active Suicidal Ideation with Some Intent to Act, Without Specific Plan (Lifetime)   Y      Q5 Active Suicidal Ideation with Specific Plan and Intent (Lifetime)   Y      Most Severe Ideation Rating (Past 1 Month)    4     Frequency (Past 1 Month)    5     Duration (Past 1 Month)    3     Controllability (Past 1 Month)    4     Deterrents (Past 1 Month)    3     Reasons for Ideation (Past 1 Month)    4     Actual Attempt (Lifetime)   N      Actual Attempt (Past 3 Months)    N     Has subject engaged in non-suicidal self-injurious behavior? (Lifetime)   N      Has subject engaged in non-suicidal self-injurious behavior? (Past 3 Months)    N     Interrupted Attempts (Lifetime)   N      Interrupted Attempts (Past 3 Months)    N     Aborted or Self-Interrupted Attempt (Lifetime)   N      Aborted or Self-Interrupted Attempt (Past 3 Months)    N     Preparatory Acts or Behavior (Lifetime)   Y      Total Number of Preparatory Acts (Lifetime)   1      Preparatory Acts or Behavior Description (Lifetime)   Pt researched how many hydroxozine she needed to take to end her life.      Preparatory Acts or Behavior (Past 3 Months)    Y     Total Number of Preparatory Acts (Past 3 Months)    1     Preparatory Acts or Behavior Description (Past 3 Months)    Pt researched how many hydroxozine she would need to take to end her life.     Calculated C-SSRS Risk Score (Lifetime/Recent)   Moderate Risk High  Risk         LOCUS completed for recommended level of care? yes  IOP: 17-19; PHP: 20-22     Copy/Paste current Safety Plan to the BEH TX PLAN ENCOUNTER. yes    Safety status/concerns: No     Substance use concerns: no  NOTE: if no substance use concerns, OK to delete below:     Patient reported last use of substances as: THC - month and a half ago, alcohol 2 weeks ago.     Patient reports/presents withdrawal/intoxication concerns: no    Based on the negative CAGE score and clinical interview there  are not indications of drug or alcohol abuse.    Pertinent Medical/Nutritional concerns: no    Confirm Emergency Contact listed in the SnapShot/Demographics with patient and notify OBC if an update is required. yes    Paper or Docusign requirements for ROIs, e-ANDI, emergency contact, etc have been completed? yes  If not, do upon admission.     Does patient have FMLA or Short-Term Disability requests/plans? yes  NOTE: Whenever possible, FMLA or Short-Term Disability paperwork needs to be managed/completed by the patient's community provider.   Exceptions: Patient does not have a community provider AND request is specific to mental health and time off for the duration of the program participation.    Notify RN Triage as soon as possible.     Care Providers/Medication Management Needs:     Does patient have a current community or other [facility] provider prescribing medications for mental health? Yes - PCP  NOTE: Delete below if not applicable:    Psychiatric Provider (or PCP if managing MH meds)/Name:  Penelope Reyna  [facility] name/location: [place]  Last appointment:  Yesterday  Next appointment:  October     NOTE: Inform patients, program is temporary and we will not be transferring care. Patient's should continue to see their community provider.       Individual Therapist/Name:  Joleen Lira  [facility] name/location: [facility] in [place], [place]  Last appointment:  Last Tuesday  Next appointment:  Friday at 9 am -  9/13/2024     Patient will continue to see above provider while participating in program: no        RECOMMENDATIONS:     Patient Admission Completed: no    Care Team, referrals made/needed: no  PCP: Shelley Méndez  NOTE: Notify RN, as needed, to make internal referrals.                                                             Completed by: Sejal Richard RN

## 2024-09-06 NOTE — PROGRESS NOTES
Jere Safety Plan       Creation Date: 9/2/24        Step 1: Warning signs:     Warning Signs     A lot of crying without relief     Getting stuck on thought patterns     Not sleeping, not eating.     Not wanting to do things I normally do     Suicidal ideation      Step 2: Internal coping strategies - Things I can do to take my mind off my problems without contacting another person:     Strategies     Paint with water color.     Studying scripture and and journal     Read books     Play gitar and ukele     Being outside, going for walks with my dog      Step 3: People and social settings that provide distraction:     Name Contact Information     My friends: naz Echevarria and Surekha       My  Juliocesar          Places     Nature, going for walks      Step 4: People whom I can ask for help during a crisis:     Name Contact Information     My freinds: Swathi Ledesma and Leslie        Step 5: Professionals or agencies I can contact during a crisis:     Clinician/Agency Name Phone Emergency Contact     New therapist: Joleen Myers psyhciatrist            Local Emergency Department Emergency Department Address Emergency Department Phone     COPE [phone]          Suicide Prevention Lifeline Phone: Call or Text 325  Crisis Text Line: Text HOME to 245650     Step 6: Making the environment safer (plan for lethal means safety):   My  will keep the medications and dispense.      Optional: What is most important to me and worth living for?:   I feel like I have  purpose to love and serve people.   I want to live for my family.      Jere Safety Plan. Laura Arechiga and Daron Arriaza. Used with permission of the authors.

## 2024-09-06 NOTE — PROGRESS NOTES
"RN Review of Medical History / Physical Health Screen  Outpatient Mental Health Programs - [facility] Adult Mental Health Outpatient Programs    PATIENT'S NAME: Lora Cardenas  Preferred name: Lora   48 year old  MRN:   [id number]  :   1975  ACCT. NUMBER: [id number]  CURRENT AGE:  48 year old    DATE OF DIAGNOSTIC ASSESSMENT: 24  DATE OF ADMISSION: 9/10/2024     Please see Diagnostic Assessment for additional Medical History.     General Health:   Have you had any exposure to any communicable disease in the past 2-3 weeks? no     Do you have a history of seizures?     If so, do you have a seizure plan? Known triggers?     Notify patient that we will call 911 (if virtual) or a code (if in-person), if we were to witness seizure during group. no         Nutrition:    Are you on a special diet? If yes, please explain:  no   Do you have any concerns regarding your nutritional status? If yes, please explain:  no   Have you had any appetite changes in the last 3 months?  Yes, \"little appetite\"     Have you had any weight loss or weight gain in the last 3 months?  Yes, how much? 8 pounds lighter           Height/Weight Review:  Patient reported height:  5'7\"      Patient reports weight:  Date last checked:  230 lb   2024       Patient height and weight recorded by RN in epic flowsheet: No; Unable to measure  Programmatic Care currently provided via telehealth. All pt weights and heights will be collected through patient self-report and recorded in physical health screening progress note upon admission to the program.      BMI Review:  Was the patient informed of BMI? No.     Findings Normal, No Intervention         Fall Risk:   Have you had any falls in the past 3 months? no     Do you currently use any assistive devices for mobility?   no      Does the patient have medication concerns? yes     Was an MTM referral placed? no      Does the patient have any acute or chronic pain concerns " that might impact participation in the program? no       Additional Comments/Assessment: Highly cooperative with admission    Per completion of the Medical History / Physical Health Screen, is there a recommendation to see / follow up with a primary care physician/clinic or dentist?    Yes, Recommendations:   pain  physical exam          Sejal Richard RN  9/6/2024

## 2024-09-10 ENCOUNTER — HOSPITAL ENCOUNTER (OUTPATIENT)
Dept: BEHAVIORAL HEALTH | Facility: CLINIC | Age: 49
Discharge: HOME | End: 2024-09-10
Attending: PSYCHIATRY & NEUROLOGY
Payer: COMMERCIAL

## 2024-09-10 PROBLEM — F33.2 MAJOR DEPRESSIVE DISORDER, RECURRENT EPISODE, SEVERE WITH ANXIOUS DISTRESS (H): Status: ACTIVE | Noted: 2024-09-10

## 2024-09-10 PROCEDURE — 90853 GROUP PSYCHOTHERAPY: CPT

## 2024-09-10 PROCEDURE — 90832 PSYTX W PT 30 MINUTES: CPT

## 2024-09-10 ASSESSMENT — ANXIETY QUESTIONNAIRES
IF YOU CHECKED OFF ANY PROBLEMS ON THIS QUESTIONNAIRE, HOW DIFFICULT HAVE THESE PROBLEMS MADE IT FOR YOU TO DO YOUR WORK, TAKE CARE OF THINGS AT HOME, OR GET ALONG WITH OTHER PEOPLE: VERY DIFFICULT
GAD7 TOTAL SCORE: 18
2. NOT BEING ABLE TO STOP OR CONTROL WORRYING: MORE THAN HALF THE DAYS
7. FEELING AFRAID AS IF SOMETHING AWFUL MIGHT HAPPEN: MORE THAN HALF THE DAYS
3. WORRYING TOO MUCH ABOUT DIFFERENT THINGS: NEARLY EVERY DAY
6. BECOMING EASILY ANNOYED OR IRRITABLE: NEARLY EVERY DAY
5. BEING SO RESTLESS THAT IT IS HARD TO SIT STILL: MORE THAN HALF THE DAYS
GAD7 TOTAL SCORE: 18
1. FEELING NERVOUS, ANXIOUS, OR ON EDGE: NEARLY EVERY DAY

## 2024-09-10 ASSESSMENT — COLUMBIA-SUICIDE SEVERITY RATING SCALE - C-SSRS
2. HAVE YOU ACTUALLY HAD ANY THOUGHTS OF KILLING YOURSELF?: YES
BASED ON RESPONSES TO C-SSRS QS 1-6, WHAT IS THE PATIENT'S OVERALL RISK RATING FOR SUICIDE: LOW RISK
1. SINCE LAST CONTACT, HAVE YOU WISHED YOU WERE DEAD OR WISHED YOU COULD GO TO SLEEP AND NOT WAKE UP?: YES

## 2024-09-10 ASSESSMENT — PATIENT HEALTH QUESTIONNAIRE - PHQ9
SUM OF ALL RESPONSES TO PHQ QUESTIONS 1-9: 23
5. POOR APPETITE OR OVEREATING: NEARLY EVERY DAY

## 2024-09-10 NOTE — PROGRESS NOTES
Intensive Outpatient Program   Physician Certification of Medical Necessity    Patient Name: Lora Cardenas  Patient Preferred Name: Lora Fierro : 1975  Patient MRN: [id number]    Attending physician: Noe Portillo MD      Admission Certification:    I certify the above-named patient would require partial hospitalization care if intensive outpatient services were not provided and that the patient requires such IOP services for a minimum of 9 hours per week. These services are provided under the care and supervision of a physician and under an individualized plan of treatment that is established and periodically reviewed by a physician in consultation with appropriate staff participating in the program.    From admission date: September 10, 2024   to 60 day: 24    Noe Portillo MD on 9/10/2024 at 12:10 PM

## 2024-09-10 NOTE — GROUP NOTE
Psychotherapy Group Note    PATIENT'S NAME: Lora Cardenas  MRN:   [id number]  :   1975  ACCT. NUMBER: [id number]  DATE OF SERVICE: 9/10/24  START TIME: 11:00 AM  END TIME: 11:50 AM  FACILITATOR: Lynn Ibrahim LICSW  TOPIC: MH EBP Group: Coping Skills  [facility] Adult Mental Health Outpatient Programs  TRACK: IOP 1    NUMBER OF PARTICIPANTS: 6    Summary of Group / Topics Discussed:  Coping Skills: Grounding part 1: Patients discussed and practiced strategies to increase attachment / presence to the current moment.  Patients identified situations in which using these strategies will help improve emotion regulation sense of calm in the body.  Reviewed the benefits of applying grounding strategies, as well as past / current practices of each member.  Patients identified situations in which using these strategies would reduce stress. They developed the ability to distinguish when these strategies can be useful in their lives for management and stress and psychological well-being.    Patient Session Goals / Objectives:  Understand the purpose of using grounding strategies to reduce stress.  Verbalize understanding of how and when to apply grounding strategies to reduce distress and increase presence in the moment.  Review patients current grounding practices and discuss a more formal way of practicing and accessing skills.  Practice using various calming strategies (e.g. 5-4-3-2-1; mental and body awareness).  Choose 1-2 grounding strategies to apply during times of distress.      Patient Participation / Response:  Fully participated with the group by sharing personal reflections / insights and openly received / provided feedback with other participants.    Demonstrated understanding of topics discussed through group discussion and participation, Expressed understanding of the relevance / importance of coping skills at distressing times in life, Demonstrated knowledge of when to consider using a variety  of coping skills in daily life, Identified barriers to applying coping skills, Identified 2-3 positive coping strategies that have helped maintain / improve symptoms in the past, and Practiced 2-3 new coping skills in session    Treatment Plan:  Patient has a current master individualized treatment plan.  See Epic treatment plan for more information.    NURYS BalderramaSW

## 2024-09-10 NOTE — GROUP NOTE
Psychotherapy Group Note    PATIENT'S NAME: Lora Cardenas  MRN:   [id number]  :   1975  ACCT. NUMBER: [id number]  DATE OF SERVICE: 9/10/24  START TIME: 10:00 AM  END TIME: 10:50 AM  FACILITATOR: Mela Olsen LICSW  TOPIC: MH EBP Group: Cognitive Restructuring  [facility] Mental Health Outpatient Programs  TRACK: IOP 1    NUMBER OF PARTICIPANTS: 7    Summary of Group / Topics Discussed:  Cognitive Restructuring: Distortions: Patients received an overview of how to identify common cognitive distortions. Patients will explore alternatives to cognitive distortions and practice challenging their negative thought patterns. The goal is to help patients target modify ineffective thought patterns.     Patient Session Goals / Objectives:  Familiarized self with ineffective / unhealthy thoughts and how they develop.    Explored impact of ineffective thoughts / distortions on mood and activity  Formulated new neutral/positive alternatives to challenge less helpful / ineffective thoughts.  Practiced and plan to apply in daily life             Patient Participation / Response:  Fully participated with the group by sharing personal reflections / insights and openly received / provided feedback with other participants.    Demonstrated understanding of topics discussed through group discussion and participation and Expressed understanding of the relationship between behaviors, thoughts, and feelings    Treatment Plan:  Patient has a current master individualized treatment plan.  See Epic treatment plan for more information.    BRYNN Christiansen

## 2024-09-10 NOTE — GROUP NOTE
"Process Group Note    PATIENT'S NAME: Lora Cardenas  MRN:   [id number]  :   1975  ACCT. NUMBER: [id number]  DATE OF SERVICE: 9/10/24  START TIME:  9:00 AM  END TIME:  9:50 AM  FACILITATOR: Mela Olsen LICSW  TOPIC:  Process Group    Diagnoses:     296.33 (F33.2) Major Depressive Disorder, Recurrent Episode, Severe _ and With anxious distress.    [facility] Adult Mental Health Outpatient Programs  TRACK: IOP 1    NUMBER OF PARTICIPANTS: 7        Data:    Session content: At the start of this group, patients were invited to check in by identifying themselves, describing their current emotional status, and identifying issues to address in this group.   Area(s) of treatment focus addressed in this session included Symptom Management and Personal Safety.  Pt is welcomed and oriented to group, on this her first day in the program. She reports depression and anxiety, since childhood. She received a PTSD diagnosis recently. Pt reports increased suicidal ideation while in [employer] with her mother, leading her to seek help. She identifies coping skill as playing guitar. She is grateful for her , kids and dog. Denies safety concerns.    Therapeutic Interventions/Treatment Strategies:  Psychotherapist offered support, feedback and validation, provided redirection, and reinforced use of skills. Treatment modalities used include Cognitive Behavioral Therapy. Interventions include Behavioral Activation: Explored how behaviors effect mood and interact with thoughts and feelings, Coping Skills: Discussed use of self-soothe skills to decrease distress in the body, Discussed how the use of intentional \"in the moment\" actions can help reduce distress, and Reviewed patients current calming practices and discussed a more formal way of practicing and accessing skills, and Symptoms Management: Promoted understanding of their diagnoses and how it impacts their functioning.    Assessment:    Patient response: "   Patient responded to session by accepting feedback, listening, focusing on goals, being attentive, and accepting support    Possible barriers to participation / learning include: and no barriers identified    Health Issues:   None reported       Substance Use Review:   Substance Use: No active concerns identified.    Mental Status/Behavioral Observations  Appearance:   Appropriate   Eye Contact:   Good   Psychomotor Behavior: Restless   Attitude:   Cooperative   Orientation:   All  Speech   Rate / Production: Normal    Volume:  Normal   Mood:    Anxious  Depressed  Fearful  Affect:    Tearful Worrisome   Thought Content:   Rumination  Thought Form:  Coherent  Obsessive     Insight:    Fair     Plan:   Safety Plan: No current safety concerns identified.  Recommended that patient call 911 or go to the local ED should there be a change in any of these risk factors.   Barriers to treatment: None identified  Patient Contracts (see media tab):  None  Substance Use: Not addressed in session   Continue or Discharge: Patient will continue in Adult Day Treatment (ADT)  as planned. Patient is likely to benefit from learning and using skills as they work toward the goals identified in their treatment plan.      Mela Whiteside, Cary Medical CenterSW  September 10, 2024

## 2024-09-10 NOTE — PROGRESS NOTES
"      Progress Note    Patient Name: Lora Cardenas  Date: 9/10/2024         Service Type: Individual      Session Start Time: 12:00 PM  Session End Time: 12:25 PM     Session Length: 25 minutes      Track: IOP 1 (Admission IOP)    DATA    Current Stressors / Issues:  Patient started her first day of IOP 1 today.  Writer met with patient 1:1 to review symptoms, goals and safety. She reports: \"I am in this program because: \"I was discharged from [facility] on labor day.  I had something happen in my immediate family with one of my sons. He wrote me a letter and it spiraled me into a really dark place. I was not able to stop crying, I was having suicidal ideation. I had my plan, was writing my letters. My friend Swathi happened to stop by and helped me call [phone]. We made a asfety plan and I gave Swathi all my pills.  The next day I felt worse and could not cope\".  She also reports compounding losses: uncle and father in law passed away this year.    She endorses the following symptoms: suicidal thoughts, emotion instability, unhelpful or intrusive thoughts, negative self-talk, difficulty with sleep, low motivation, and difficulty with task completion    Treatment Objective(s) Addressed in This Session:  Review and follow safety plan:  \"I do have a safety plan that I made with 988, that's in effect. My  has all my medications and they are locked up.  Also I can call  or friend Swathi who knows everything\"    Discuss coping skills: Take medications regularly, building positive experiences, self-soothe (e.g.  Continue going to her friend's farm weekly on Thursdays to engage with the animals).    Progress on Treatment Objective(s) / Homework:  New Objective established this session - PREPARATION (Decided to change - considering how); Intervened by negotiating a change plan and determining options / strategies for behavior change, identifying triggers, exploring social supports, and working " "towards setting a date to begin behavior change    Therapeutic Interventions/Treatment Strategies:  Support, Safety Assessments, Problem Solving, and Cognitive Behavioral Therapy    Response to Treatment Strategies:  Accepted Feedback, Gave Feedback, Listened, Focused on Goals, Attentive, and Accepted Support    Changes in Health Issues:   None reported    Chemical Use Review:   Substance Use: No substance use concerns reported / identified    ASSESSMENT:    Current Emotional / Mental Status (status of significant symptoms):  Risk status (Self / Other harm or suicidal ideation)  Patient has had a history of suicidal ideation: Active SI and self-injurious behavior: SIB urges; last time was 8/29 when she reports an urge to take large quantity of her medications  Patient denies current fears or concerns for personal safety.  Patient reports the following current or recent suicidal ideation or behaviors: \"Moderate to high SI. Yesterday I wanted to buy all the nyquil and drink it in my car. Those thoughts were surprising, I never had thoughts like this before\".  Patient denies current or recent homicidal ideation or behaviors.  Patient denies current or recent self injurious behavior or ideation.  Patient denies other safety concerns.  A safety and risk management plan has not been developed at this time, however patient was encouraged to call [place] [phone] should there be a change in any of these risk factors.    Appearance:   Appropriate   Eye Contact:   Good   Psychomotor Behavior: Normal   Attitude:   Cooperative  Interested Friendly Pleasant  Orientation:   All  Speech   Rate / Production: Normal/ Responsive Normal    Volume:  Normal   Mood:    Anxious  Depressed   Affect:    Tearful  Thought Content:  Clear   Thought Form:  Coherent  Logical   Insight:    Good     Assessments completed:  The following assessments were completed by patient for this visit:  PHQ9:       8/3/2023    10:42 AM 9/4/2024    11:30 AM " 9/10/2024     9:00 AM   PHQ-9 SCORE   PHQ-9 Total Score 4 27 23     GAD7:       9/4/2024    11:30 AM 9/10/2024     9:00 AM   JANES-7 SCORE   Total Score 18 18        Diagnoses:  296.33 (F33.2) Major Depressive Disorder, Recurrent Episode, Severe _ and With anxious distress.     Plan: (Homework, other):  Follow safety plan; identify new treatment goals  2. Patient has a current master individualized treatment plan.  See Epic treatment plan for more information. (update this to include a date when DA was done)                                                Patient has reviewed and agreed to the above plan.      Lynn Ibrahim, BRYNN  September 10, 2024

## 2024-09-11 ENCOUNTER — HOSPITAL ENCOUNTER (OUTPATIENT)
Dept: BEHAVIORAL HEALTH | Facility: CLINIC | Age: 49
Discharge: HOME | End: 2024-09-11
Attending: PSYCHIATRY & NEUROLOGY
Payer: COMMERCIAL

## 2024-09-11 DIAGNOSIS — F33.2 MAJOR DEPRESSIVE DISORDER, RECURRENT EPISODE, SEVERE WITH ANXIOUS DISTRESS (H): Primary | ICD-10-CM

## 2024-09-11 PROCEDURE — 99205 OFFICE O/P NEW HI 60 MIN: CPT

## 2024-09-11 PROCEDURE — 90853 GROUP PSYCHOTHERAPY: CPT

## 2024-09-11 RX ORDER — LORAZEPAM 0.5 MG/1
0.5 TABLET ORAL DAILY PRN
Qty: 10 TABLET | Refills: 0 | Status: SHIPPED | OUTPATIENT
Start: 2024-09-11
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
## 2024-09-11 NOTE — ADDENDUM NOTE
Encounter addended by: Mela Olsen, LICSW on: 9/11/2024 4:08 PM   Actions taken: Charge Capture section accepted

## 2024-09-11 NOTE — GROUP NOTE
"Process Group Note    PATIENT'S NAME: Lora Cardenas  MRN:   [id number]  :   1975  ACCT. NUMBER: [id number]  DATE OF SERVICE: 24  START TIME:  9:00 AM  END TIME:  9:50 AM  FACILITATOR: Mela Olsen LICSW  TOPIC:  Process Group    Diagnoses:     296.33 (F33.2) Major Depressive Disorder, Recurrent Episode, Severe _ and With anxious distress.    [facility] Mental Health Outpatient Programs  TRACK: IOP 1    NUMBER OF PARTICIPANTS: 6        Data:    Session content: At the start of this group, patients were invited to check in by identifying themselves, describing their current emotional status, and identifying issues to address in this group.   Area(s) of treatment focus addressed in this session included Symptom Management and Personal Safety.  Pt reports a struggle to get to the program today and a desire to isolate at home. Today is the first day that her mother was not there to help. Pt identifies coping skills as humor and grounding. She sets a goal to \"get through the day\". Pt reports fleeting and unpredictable suicidal ideation, but is committed to safety.    Therapeutic Interventions/Treatment Strategies:  Psychotherapist offered support, feedback and validation, provided redirection, and reinforced use of skills. Treatment modalities used include Cognitive Behavioral Therapy. Interventions include Coping Skills: Discussed use of self-soothe skills to decrease distress in the body, Assisted patient in identifying 1-2 healthy distraction skills to reduce overall distress, Discussed how the use of intentional \"in the moment\" actions can help reduce distress, and Reviewed patients current calming practices and discussed a more formal way of practicing and accessing skills.    Assessment:    Patient response:   Patient responded to session by accepting feedback, giving feedback, listening, focusing on goals, being attentive, and accepting support    Possible barriers to participation " / learning include: and no barriers identified    Health Issues:   None reported       Substance Use Review:   Substance Use: No active concerns identified.    Mental Status/Behavioral Observations  Appearance:   Appropriate   Eye Contact:   Good   Psychomotor Behavior: Normal   Attitude:   Cooperative   Orientation:   All  Speech   Rate / Production: Normal    Volume:  Normal   Mood:    Anxious  Depressed   Affect:    Worrisome   Thought Content:   Rumination  Thought Form:  Coherent     Insight:    Good     Plan:   Safety Plan: No current safety concerns identified.  Recommended that patient call 911 or go to the local ED should there be a change in any of these risk factors.   Barriers to treatment: None identified  Patient Contracts (see media tab):  None  Substance Use: Not addressed in session   Continue or Discharge: Patient will continue in Adult Day Treatment (ADT)  as planned. Patient is likely to benefit from learning and using skills as they work toward the goals identified in their treatment plan.      Mela Whiteside, LICSW  September 11, 2024

## 2024-09-11 NOTE — GROUP NOTE
Psychotherapy Group Note    PATIENT'S NAME: Lora Cardenas  MRN:   [id number]  :   1975  ACCT. NUMBER: [id number]  DATE OF SERVICE: 24  START TIME: 11:00 AM  END TIME: 11:50 AM  FACILITATOR: Lynn Ibrahim LICSW  TOPIC: MH EBP Group: Coping Skills  [facility] Adult Mental Health Outpatient Programs  TRACK: IOP 1    NUMBER OF PARTICIPANTS: 6    Summary of Group / Topics Discussed:  Coping Skills: Grounding part 2: Patients discussed and practiced strategies to increase attachment / presence to the current moment.  Patients identified situations in which using these strategies will help improve emotion regulation sense of calm in the body.  Reviewed the benefits of applying grounding strategies, as well as past / current practices of each member.  Patients identified situations in which using these strategies would reduce stress. They developed the ability to distinguish when these strategies can be useful in their lives for management and stress and psychological well-being.    Patient Session Goals / Objectives:  Understand the purpose of using grounding strategies to reduce stress.  Verbalize understanding of how and when to apply grounding strategies to reduce distress and increase presence in the moment.  Review patients current grounding practices and discuss a more formal way of practicing and accessing skills.  Practice using various calming strategies (e.g. 5-4-3-2-1; mental and body awareness).  Choose 1-2 grounding strategies to apply during times of distress.      Patient Participation / Response:  Fully participated with the group by sharing personal reflections / insights and openly received / provided feedback with other participants.    Demonstrated understanding of topics discussed through group discussion and participation, Expressed understanding of the relevance / importance of coping skills at distressing times in life, Demonstrated knowledge of when to consider using a variety  of coping skills in daily life, Identified barriers to applying coping skills, Identified 2-3 positive coping strategies that have helped maintain / improve symptoms in the past, and Practiced 2-3 new coping skills in session    Treatment Plan:  Patient has a current master individualized treatment plan.  See Epic treatment plan for more information.    NURYS BalderramaSW

## 2024-09-12 ENCOUNTER — HOSPITAL ENCOUNTER (OUTPATIENT)
Dept: BEHAVIORAL HEALTH | Facility: CLINIC | Age: 49
Discharge: HOME | End: 2024-09-12
Attending: PSYCHIATRY & NEUROLOGY
Payer: COMMERCIAL

## 2024-09-12 PROCEDURE — 90853 GROUP PSYCHOTHERAPY: CPT

## 2024-09-12 NOTE — GROUP NOTE
Psychotherapy Group Note    PATIENT'S NAME: Lora Cardenas  MRN:   [id number]  :   1975  ACCT. NUMBER: [id number]  DATE OF SERVICE: 24  START TIME: 10:00 AM  END TIME: 10:50 AM  FACILITATOR: Mela Olsen LICSW  TOPIC: MH EBP Group: Cognitive Restructuring  [facility] Mental Health Outpatient Programs  TRACK: IOP 1    NUMBER OF PARTICIPANTS: 5    Summary of Group / Topics Discussed:  Cognitive Restructuring: Distortions part 3: Patients received an overview of how to identify common cognitive distortions. Patients will explore alternatives to cognitive distortions and practice challenging their negative thought patterns. The goal is to help patients target modify ineffective thought patterns.     Patient Session Goals / Objectives:  Familiarized self with ineffective / unhealthy thoughts and how they develop.    Explored impact of ineffective thoughts / distortions on mood and activity  Formulated new neutral/positive alternatives to challenge less helpful / ineffective thoughts.  Practiced and plan to apply in daily life             Patient Participation / Response:  Fully participated with the group by sharing personal reflections / insights and openly received / provided feedback with other participants.    Demonstrated understanding of topics discussed through group discussion and participation, Expressed understanding of the relationship between behaviors, thoughts, and feelings, Demonstrated knowledge of personal thought patterns and how they impact their mood and behavior., Identified barriers to changing thought patterns, and Identified / Expressed personal readiness to practice CBT    Treatment Plan:  Patient has a current master individualized treatment plan.  See Epic treatment plan for more information.    BRYNN Christiansen

## 2024-09-12 NOTE — GROUP NOTE
"Process Group Note    PATIENT'S NAME: Lora Cardenas  MRN:   [id number]  :   1975  ACCT. NUMBER: [id number]  DATE OF SERVICE: 24  START TIME:  9:00 AM  END TIME:  9:50 AM  FACILITATOR: Mela Olsen LICSW  TOPIC:  Process Group    Diagnoses:     296.33 (F33.2) Major Depressive Disorder, Recurrent Episode, Severe _ and With anxious distress.    [facility]  TRACK: IOP 1    NUMBER OF PARTICIPANTS: 7        Data:    Session content: At the start of this group, patients were invited to check in by identifying themselves, describing their current emotional status, and identifying issues to address in this group.   Area(s) of treatment focus addressed in this session included Symptom Management and Personal Safety.  Pt reports feeling more hopeful today. She states \"I rode here with the windows rolled down and the my heart felt free\". Pt will meet with psychiatrist today and discuss FMLA. She is anxious about seeing new individual therapist today. Pt feels triggered by needing to repeat her story. We discussed ways to be assertive. She coping skills as: napping, walking in the grass barefoot and walking the dog. Denies safety concerns.    Therapeutic Interventions/Treatment Strategies:  Psychotherapist offered support, feedback and validation and reinforced use of skills. Treatment modalities used include Cognitive Behavioral Therapy. Interventions include Coping Skills: Discussed use of self-soothe skills to decrease distress in the body, Discussed how the use of intentional \"in the moment\" actions can help reduce distress, and Reviewed patients current calming practices and discussed a more formal way of practicing and accessing skills and Relationship Skills: Assisted patients in implementing more effective communication skills in their relationships.    Assessment:    Patient response:   Patient responded to session by accepting feedback, giving " feedback, listening, focusing on goals, being attentive, and accepting support    Possible barriers to participation / learning include: and no barriers identified    Health Issues:   None reported       Substance Use Review:   Substance Use: No active concerns identified.    Mental Status/Behavioral Observations  Appearance:   Appropriate   Eye Contact:   Good   Psychomotor Behavior: Normal   Attitude:   Cooperative   Orientation:   All  Speech   Rate / Production: Normal    Volume:  Normal   Mood:    Anxious  Depressed   Affect:    Worrisome   Thought Content:   Rumination  Thought Form:  Coherent  Logical     Insight:    Good     Plan:   Safety Plan: No current safety concerns identified.  Recommended that patient call 911 or go to the local ED should there be a change in any of these risk factors.   Barriers to treatment: None identified  Patient Contracts (see media tab):  None  Substance Use: Not addressed in session   Continue or Discharge: Patient will continue in Adult Day Treatment (ADT)  as planned. Patient is likely to benefit from learning and using skills as they work toward the goals identified in their treatment plan.      Mela Whiteside, [facility]  September 12, 2024

## 2024-09-12 NOTE — GROUP NOTE
Psychotherapy Group Note    PATIENT'S NAME: Lora Cardenas  MRN:   [id number]  :   1975  ACCT. NUMBER: [id number]  DATE OF SERVICE: 24  START TIME: 11:00 AM  END TIME: 11:50 AM  FACILITATOR: Lynn Ibrahim LICSW  TOPIC: MH EBP Group: Coping Skills  [facility] Outpatient [facility]  TRACK: IOP 1    NUMBER OF PARTICIPANTS: 7    Summary of Group / Topics Discussed:  Coping Skills: Additional Coping Skills:  Patients discussed and practiced positive self-affirmations.  Reviewed the benefits of applying the aforementioned coping strategies.  Patients explored how these strategies might be applied to daily stressors or distressing situations.    Patient Session Goals / Objectives:  Understand the purpose and benefits of applying positive affirmation coping strategies  Create and discuss 3-4 positive self-affirmations  Address barriers to utilizing coping skills when in distress.      Patient Participation / Response:  Fully participated with the group by sharing personal reflections / insights and openly received / provided feedback with other participants.    Demonstrated understanding of topics discussed through group discussion and participation, Expressed understanding of the relevance / importance of coping skills at distressing times in life, Demonstrated knowledge of when to consider using a variety of coping skills in daily life, Identified barriers to applying coping skills, Identified 2-3 positive coping strategies that have helped maintain / improve symptoms in the past, and Practiced 2-3 new coping skills in session    Treatment Plan:  Patient has a current master individualized treatment plan.  See Epic treatment plan for more information.    BRYNN Balderrama

## 2024-09-16 ENCOUNTER — HOSPITAL ENCOUNTER (OUTPATIENT)
Dept: BEHAVIORAL HEALTH | Facility: CLINIC | Age: 49
Discharge: HOME | End: 2024-09-16
Attending: PSYCHIATRY & NEUROLOGY
Payer: COMMERCIAL

## 2024-09-16 PROCEDURE — 90853 GROUP PSYCHOTHERAPY: CPT

## 2024-09-16 NOTE — GROUP NOTE
Psychoeducation Group Note    PATIENT'S NAME: Lora Cardenas  MRN:   [id number]  :   1975  ACCT. NUMBER: [id number]  DATE OF SERVICE: 24  START TIME: 10:00 AM  END TIME: 10:50 AM  FACILITATOR: Mela Olsen LICSW  TOPIC: MH Wellness Group: Health Maintenance  [facility]patient [facility]  TRACK: IOP 1    NUMBER OF PARTICIPANTS: 7    Summary of Group / Topics Discussed:  Health Maintenance: Goal Setting: Meaningful goals can bring a sense of direction and purpose in life.  They also highlight our most important values. Patients were assisted by instructor to identify short term goals to promote their mental health recovery and improve overall health and wellness. Patients were educated on SMART goal setting framework as a strategy to increase outcomes and promote success.    Patient Session Goals / Objectives:  Explained the key concepts of SMART goal setting framework  Identified three goals successfully using SMART goal setting framework  Reviewed concept of balance in wellness as it pertains to goal setting        Patient Participation / Response:  Fully participated with the group by sharing personal reflections / insights and openly received / provided feedback with other participants.    Demonstrated understanding of topics discussed through group discussion and participation, Identified / Expressed personal readiness to practice skills, and Verbalized understanding of health maintenance topic    Treatment Plan:  Patient has a current master individualized treatment plan.  See Epic treatment plan for more information.    BRYNN Christiansen

## 2024-09-16 NOTE — GROUP NOTE
Psychotherapy Group Note    PATIENT'S NAME: Lora Cardenas  MRN:   [id number]  :   1975  ACCT. NUMBER: [id number]  DATE OF SERVICE: 24  START TIME: 11:00 AM  END TIME: 11:50 AM  FACILITATOR: Lynn Ibrahim LICSW  TOPIC: MH EBP Group: Behavioral Activation  [facility] Adult [facility] Outpatient [facility]  TRACK: IOP 1    NUMBER OF PARTICIPANTS: 7    Summary of Group / Topics Discussed:  Behavioral Activation: The Change Process - Behavior Change: Patients explored the process and types of change, including but not limited to, theories of change, steps to making change, methods of changing behavior, and potential barriers.  Patients worked to identify what changes may benefit their daily lives, and work towards a plan to implement change.      Patient Session Goals / Objectives:  Demonstrate understanding of the change process.    Identify positive and negative behavioral patterns.  Make plans to track and implement changes and share experiences in group.  Identify personal barriers to change      Patient Participation / Response:  Fully participated with the group by sharing personal reflections / insights and openly received / provided feedback with other participants.    Demonstrated understanding of topics discussed through group discussion and participation, Expressed understanding of the relationship between behaviors, thoughts, and feelings, Shared experiences and challenges with making behavioral changes, Identified barriers to change, Identified / Expressed personal readiness to make behavioral change, and Identified ways to increase goal directed activities    Treatment Plan:  Patient has a current master individualized treatment plan.  See Epic treatment plan for more information.    BRYNN Balderrama

## 2024-09-16 NOTE — GROUP NOTE
"Process Group Note    PATIENT'S NAME: Lora Cardenas  MRN:   [id number]  :   1975  ACCT. NUMBER: [id number]  DATE OF SERVICE: 24  START TIME:  9:00 AM  END TIME:  9:50 AM  FACILITATOR: Mela Olsen LICSW  TOPIC:  Process Group    Diagnoses:     296.33 (F33.2) Major Depressive Disorder, Recurrent Episode, Severe _ and With anxious distress.    [facility] Outpatient [facility]  TRACK: IOP 1    NUMBER OF PARTICIPANTS: 7        Data:    Session content: At the start of this group, patients were invited to check in by identifying themselves, describing their current emotional status, and identifying issues to address in this group.   Area(s) of treatment focus addressed in this session included Symptom Management and Personal Safety.  Pt reports feeling \"fatigued and heartbroken\". She describes \"a difficult weekend without the group, but needing to push myself to return here\". Pt is sad about conflict in the family. 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 Pt is proud of doing laundry and is grateful for the weather. Denies safety concerns.    Therapeutic Interventions/Treatment Strategies:  Psychotherapist offered support, feedback and validation, provided redirection, and reinforced use of skills. Treatment modalities used include Cognitive Behavioral Therapy. Interventions include Coping Skills: Facilitated discussion on learning and applying radical acceptance skill, Discussed use of self-soothe skills to decrease distress in the body, and Promoted understanding of how and when to apply grounding strategies to reduce distress and increase presence in the moment and Relationship Skills: Encouraged development and maintenance  of healthy boundaries.    Assessment:    Patient response:   Patient responded to session by accepting feedback, giving feedback, listening, focusing on goals, being attentive, and accepting " support    Possible barriers to participation / learning include: and no barriers identified    Health Issues:   None reported       Substance Use Review:   Substance Use: No active concerns identified.    Mental Status/Behavioral Observations  Appearance:   Appropriate   Eye Contact:   Good   Psychomotor Behavior: Restless   Attitude:   Cooperative   Orientation:   All  Speech   Rate / Production: Normal    Volume:  Normal   Mood:    Anxious  Depressed  Grieving Fearful  Affect:    Tearful Worrisome   Thought Content:   Rumination  Thought Form:  Coherent  Obsessive     Insight:    Good     Plan:   Safety Plan: No current safety concerns identified.  Recommended that patient call 911 or go to the local ED should there be a change in any of these risk factors.   Barriers to treatment: None identified  Patient Contracts (see media tab):  None  Substance Use: Not addressed in session   Continue or Discharge: Patient will continue in Adult Day Treatment (ADT)  as planned. Patient is likely to benefit from learning and using skills as they work toward the goals identified in their treatment plan.      Mela Whiteside, [facility]  September 16, 2024

## 2024-09-17 ENCOUNTER — HOSPITAL ENCOUNTER (OUTPATIENT)
Dept: BEHAVIORAL HEALTH | Facility: CLINIC | Age: 49
Discharge: HOME | End: 2024-09-17
Attending: PSYCHIATRY & NEUROLOGY
Payer: COMMERCIAL

## 2024-09-17 PROCEDURE — 90853 GROUP PSYCHOTHERAPY: CPT

## 2024-09-17 NOTE — GROUP NOTE
Psychotherapy Group Note    PATIENT'S NAME: Lora Cardenas  MRN:   [id number]  :   1975  ACCT. NUMBER: [id number]  DATE OF SERVICE: 24  START TIME: 10:00 AM  END TIME: 10:50 AM  FACILITATOR: Mela Olsen LICSW  TOPIC: MH EBP Group: Behavioral Activation  [facility] Adult [facility] Outpatient [facility]  TRACK: IOP 1    NUMBER OF PARTICIPANTS: 6    Summary of Group / Topics Discussed:  Behavioral Activation: Motivation and Procrastination: Patients explored how they currently spend their time, identifying thoughts and feelings that are motivating and serve to increase desired behaviors.  They also examined behaviors that contribute to procrastination.  Different types of procrastination behaviors were identified, and strategies to reduce individual procrastination and increase motivation were explored and practiced.  Patients identified ways to increase goal-directed activities to enhance mood and reduce symptoms.        Patient Session Goals / Objectives:  Identify current patterns of procrastination behavior and how they influence thoughts and moods, and inhibit motivation.  Identify behaviors that can be implemented that contribute to improving thoughts and feelings, motivation, and reduce symptoms.  Identify and develop a plan to increase activities that promote a sense of accomplishment and competence.  Practice scheduling positive activities / behaviors into daily routines.      Patient Participation / Response:  Fully participated with the group by sharing personal reflections / insights and openly received / provided feedback with other participants.    Demonstrated understanding of topics discussed through group discussion and participation, Expressed understanding of the relationship between behaviors, thoughts, and feelings, and Shared experiences and challenges with making behavioral changes    Treatment Plan:  Patient has a current master individualized treatment plan.  See  Epic treatment plan for more information.    Mela Whiteside, LICSW

## 2024-09-17 NOTE — GROUP NOTE
Psychotherapy Group Note    PATIENT'S NAME: Lora Cardenas  MRN:   [id number]  :   1975  ACCT. NUMBER: [id number]  DATE OF SERVICE: 24  START TIME: 11:00 AM  END TIME: 11:50 AM  FACILITATOR: Lynn Ibrahim LICSW  TOPIC: MH EBP Group: Coping Skills  [facility] Adult [facility] Outpatient [facility]  TRACK: IOP 1    NUMBER OF PARTICIPANTS: 5    Summary of Group / Topics Discussed:  Coping Skills: Distraction: Patients learned to mindfully use distraction as a way to decrease heightened stress in the moment.  Patients will identified situations that necessitate healthy distraction strategies.  They explored ways to manage physical symptoms of distress using distraction. The group began to distinguish when this can be useful in their lives or when other strategies would be more relevant or helpful.    Patient Session Goals / Objectives:  Understand the purpose and benefits of using healthy distraction to decrease distress.  Process what happens in the body when using distraction strategies.  Demonstrate understanding of when to use distraction strategies.  Explore patient s current distraction activities, and how to take a more intentional approach to the use of distraction.  Identify and problem solve barriers to applying distraction strategies.  Choose 1-2 healthy distraction strategies to apply during times of distress.      Patient Participation / Response:  Fully participated with the group by sharing personal reflections / insights and openly received / provided feedback with other participants.    Demonstrated understanding of topics discussed through group discussion and participation, Expressed understanding of the relevance / importance of coping skills at distressing times in life, Demonstrated knowledge of when to consider using a variety of coping skills in daily life, Identified barriers to applying coping skills, and Identified 2-3 positive coping strategies that have helped maintain  / improve symptoms in the past    Treatment Plan:  Patient has a current master individualized treatment plan.  See Epic treatment plan for more information.    BRYNN Balderrama

## 2024-09-17 NOTE — GROUP NOTE
Process Group Note    PATIENT'S NAME: Lora Cardenas  MRN:   [id number]  :   1975  ACCT. NUMBER: [id number]  DATE OF SERVICE: 24  START TIME:  9:00 AM  END TIME:  9:50 AM  FACILITATOR: Mela Olsen LICSW  TOPIC:  Process Group    Diagnoses:     296.33 (F33.2) Major Depressive Disorder, Recurrent Episode, Severe _ and With anxious distress.    [facility]  TRACK: IOP 1    NUMBER OF PARTICIPANTS: 6        Data:    Session content: At the start of this group, patients were invited to check in by identifying themselves, describing their current emotional status, and identifying issues to address in this group.   Area(s) of treatment focus addressed in this session included Symptom Management and Personal Safety.  Pt reports feeling annoyed and frustrated, due to a struggle with leaving the house today. She also reports grief due to estrangement from family members. Pt feels positive about buying and restoring some antique furniture. 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 Pt is grateful for family and denies safety concerns.    Therapeutic Interventions/Treatment Strategies:  Psychotherapist offered support, feedback and validation and reinforced use of skills. Treatment modalities used include Cognitive Behavioral Therapy. Interventions include Behavioral Activation: Explored how behaviors effect mood and interact with thoughts and feelings, Coping Skills: Reviewed patients current calming practices and discussed a more formal way of practicing and accessing skills and Promoted understanding of how and when to apply grounding strategies to reduce distress and increase presence in the moment, and Symptoms Management: Promoted understanding of their diagnoses and how it impacts their functioning.    Assessment:    Patient response:   Patient responded to session by accepting feedback, giving feedback, listening, focusing on goals, being  attentive, and accepting support    Possible barriers to participation / learning include: and no barriers identified    Health Issues:   None reported       Substance Use Review:   Substance Use: No active concerns identified.    Mental Status/Behavioral Observations  Appearance:   Appropriate   Eye Contact:   Good   Psychomotor Behavior: Normal   Attitude:   Cooperative   Orientation:   All  Speech   Rate / Production: Normal    Volume:  Normal   Mood:    Anxious  Depressed  Grieving  Affect:    Tearful Worrisome   Thought Content:   Rumination  Thought Form:  Coherent  Obsessive     Insight:    Good     Plan:   Safety Plan: No current safety concerns identified.  Recommended that patient call 911 or go to the local ED should there be a change in any of these risk factors.   Barriers to treatment: None identified  Patient Contracts (see media tab):  None  Substance Use: Not addressed in session   Continue or Discharge: Patient will continue in Adult Day Treatment (ADT)  as planned. Patient is likely to benefit from learning and using skills as they work toward the goals identified in their treatment plan.      Mela Whiteside, [facility]  September 17, 2024

## 2024-09-18 ENCOUNTER — HOSPITAL ENCOUNTER (OUTPATIENT)
Dept: BEHAVIORAL HEALTH | Facility: CLINIC | Age: 49
Discharge: HOME | End: 2024-09-18
Attending: PSYCHIATRY & NEUROLOGY
Payer: COMMERCIAL

## 2024-09-18 PROCEDURE — 90853 GROUP PSYCHOTHERAPY: CPT

## 2024-09-18 NOTE — GROUP NOTE
Psychotherapy Group Note    PATIENT'S NAME: Lora Cardenas  MRN:   [id number]  :   1975  ACCT. NUMBER: [id number]  DATE OF SERVICE: 24  START TIME: 10:00 AM  END TIME: 10:50 AM  FACILITATOR: Mela Olsen LICSW  TOPIC: MH EBP Group: Behavioral Activation  [facility] Out[facility]  TRACK: IOP 1    NUMBER OF PARTICIPANTS: 6    Summary of Group / Topics Discussed:  Behavioral Activation: Sustaining Momentum: Patients explored how they currently spend their time, identifying thoughts and feelings that are motivating and serve to increase desired behaviors.  They also examined behaviors that contribute to procrastination. Patients identified behaviors that help to sustain motivation and momentum  Patients identified ways to increase goal-directed activities to enhance mood and reduce symptoms.        Patient Session Goals / Objectives:  Identify current patterns of activities and how they influence thoughts and moods, and motivation.  Identify behaviors that can be implemented that contribute to improving thoughts and feelings, motivation, and reduce symptoms.  Identify and develop a plan to increase activities that promote a sense of accomplishment and competence.  Practice scheduling positive activities / behaviors into daily routines.      Patient Participation / Response:  Fully participated with the group by sharing personal reflections / insights and openly received / provided feedback with other participants.    Demonstrated understanding of topics discussed through group discussion and participation, Expressed understanding of the relationship between behaviors, thoughts, and feelings, and Shared experiences and challenges with making behavioral changes    Treatment Plan:  Patient has a current master individualized treatment plan.  See Epic treatment plan for more information.    BRYNN Christiansen

## 2024-09-18 NOTE — GROUP NOTE
Process Group Note    PATIENT'S NAME: Lora Cardenas  MRN:   [id number]  :   1975  ACCT. NUMBER: [id number]  DATE OF SERVICE: 24  START TIME:  9:00 AM  END TIME:  9:50 AM  FACILITATOR: Mela Olsen LICSW  TOPIC: MH Process Group    Diagnoses:     296.33 (F33.2) Major Depressive Disorder, Recurrent Episode, Severe _ and With anxious distress.    [facility] Outpatient Programs  TRACK: IOP 1    NUMBER OF PARTICIPANTS: 6        Data:    Session content: At the start of this group, patients were invited to check in by identifying themselves, describing their current emotional status, and identifying issues to address in this group.   Area(s) of treatment focus addressed in this session included Symptom Management and Personal Safety.  Pt reports feeling the best that she has in three weeks. Pt realized that she was taking her medication incorrectly and has now fixed this. 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 Pt is anxious, but looking forward to seeing friends tonight. She is grateful for the group and denies safety concerns.    Therapeutic Interventions/Treatment Strategies:  Psychotherapist offered support, feedback and validation and reinforced use of skills. Treatment modalities used include Cognitive Behavioral Therapy. Interventions include Behavioral Activation: Explored how behaviors effect mood and interact with thoughts and feelings and Relationship Skills: Encouraged development and maintenance  of healthy boundaries.    Assessment:    Patient response:   Patient responded to session by accepting feedback, giving feedback, listening, focusing on goals, being attentive, and accepting support    Possible barriers to participation / learning include: and no barriers identified    Health Issues:   None reported       Substance Use Review:   Substance Use: No active concerns identified.    Mental Status/Behavioral  Observations  Appearance:   Appropriate   Eye Contact:   Good   Psychomotor Behavior: Normal   Attitude:   Cooperative   Orientation:   All  Speech   Rate / Production: Normal    Volume:  Normal   Mood:    Anxious  Normal  Affect:    Appropriate   Thought Content:   Clear  Thought Form:  Coherent  Logical     Insight:    Good     Plan:   Safety Plan: No current safety concerns identified.  Recommended that patient call 911 or go to the local ED should there be a change in any of these risk factors.   Barriers to treatment: None identified  Patient Contracts (see media tab):  None  Substance Use: Not addressed in session   Continue or Discharge: Patient will continue in Adult Day Treatment (ADT)  as planned. Patient is likely to benefit from learning and using skills as they work toward the goals identified in their treatment plan.      Mela Whiteside, [facility]  September 18, 2024

## 2024-09-18 NOTE — GROUP NOTE
Psychotherapy Group Note    PATIENT'S NAME: Lora Cardenas  MRN:   [id number]  :   1975  ACCT. NUMBER: [id number]  DATE OF SERVICE: 24  START TIME: 11:00 AM  END TIME: 11:50 AM  FACILITATOR: Lynn Ibrahim LICSW  TOPIC: MH EBP Group: Emotions Management  [facility]  TRACK: IOP 1    NUMBER OF PARTICIPANTS: 6    Summary of Group / Topics Discussed:  Emotions Management: Opposite to Emotion: Patients discussed past and present struggles with knowing how to make changes in their lives due to difficult emotional experiences.  Explored desires to experience and feel less anger, sadness, guilt, and fear.  Reviewed the therapeutic skill of opposite action and patients explored opportunities to use their behaviors as a tool to reduce an emotion that they want to change.     Patient Session Goals / Objectives:  Review DBT concepts and focus on patient s experiences of distress and difficult emotional experiences.  Learn how to do the opposite of what an emotion makes us want to do in an effort to decrease an unwanted emotional experience.  Demonstrate understanding of the skill of opposite action by sharing experiences where the technique could be useful in past / present situations.      Patient Participation / Response:  Fully participated with the group by sharing personal reflections / insights and openly received / provided feedback with other participants.    Demonstrated understanding of topics discussed through group discussion and participation, Expressed understanding of the relevance / importance of emotions management skills at distressing times in life, Self-aware of experiences with difficult emotions, and strategies to employ to manage them, Demonstrated knowledge of when to consider applying a variety of emotions management skills in daily life, Demonstrated understanding and practice strategies to manage difficult emotions and move towards healing,  Identified barriers to applying emotions management strategies, and Identified strategies to overcome barriers to use of emotions management skills    Treatment Plan:  Patient has a current master individualized treatment plan.  See Epic treatment plan for more information.    NURYS BalderramaSW

## 2024-09-23 ENCOUNTER — HOSPITAL ENCOUNTER (OUTPATIENT)
Dept: BEHAVIORAL HEALTH | Facility: CLINIC | Age: 49
Discharge: HOME | End: 2024-09-23
Attending: PSYCHIATRY & NEUROLOGY
Payer: COMMERCIAL

## 2024-09-23 PROCEDURE — 90853 GROUP PSYCHOTHERAPY: CPT

## 2024-09-23 NOTE — GROUP NOTE
"Process Group Note    PATIENT'S NAME: Lora Cardenas  MRN:   [id number]  :   1975  ACCT. NUMBER: [id number]  DATE OF SERVICE: 24  START TIME:  9:00 AM  END TIME:  9:50 AM  FACILITATOR: Mela Olsen LICSW  TOPIC:  Process Group    Diagnoses:     296.33 (F33.2) Major Depressive Disorder, Recurrent Episode, Severe _ and With anxious distress.    [facility]  TRACK: IOP 1    NUMBER OF PARTICIPANTS: 6        Data:    Session content: At the start of this group, patients were invited to check in by identifying themselves, describing their current emotional status, and identifying issues to address in this group.   Area(s) of treatment focus addressed in this session included Symptom Management and Personal Safety.  Pt reports sadness about estrangement from 28 year old son. She received an \"awful\" letter from him this summer asking her to never contact him again. Pt is worried about upcoming holidays without him. She has removed all his photos from her home. Pt identifies coping skills as: deep breathing and time with brother. She reports suicidal ideation yesterday, but none today.    Therapeutic Interventions/Treatment Strategies:  Psychotherapist offered support, feedback and validation and reinforced use of skills. Treatment modalities used include Cognitive Behavioral Therapy. Interventions include Cognitive Restructuring:  Assisted patient in formulating new neutral/positive alternatives to challenge less helpful / ineffective thoughts and Coping Skills: Discussed use of self-soothe skills to decrease distress in the body, Discussed how the use of intentional \"in the moment\" actions can help reduce distress, Reviewed patients current calming practices and discussed a more formal way of practicing and accessing skills, and Promoted understanding of how and when to apply grounding strategies to reduce distress and increase presence in the " moment.    Assessment:    Patient response:   Patient responded to session by accepting feedback, giving feedback, listening, focusing on goals, being attentive, and accepting support    Possible barriers to participation / learning include: and no barriers identified    Health Issues:   None reported       Substance Use Review:   Substance Use: No active concerns identified.    Mental Status/Behavioral Observations  Appearance:   Appropriate   Eye Contact:   Good   Psychomotor Behavior: Restless   Attitude:   Cooperative   Orientation:   All  Speech   Rate / Production: Normal    Volume:  Normal   Mood:    Anxious  Sad  Fearful  Affect:    Tearful Worrisome   Thought Content:   Rumination  Thought Form:  Coherent  Obsessive     Insight:    Good     Plan:   Safety Plan: No current safety concerns identified.  Recommended that patient call 911 or go to the local ED should there be a change in any of these risk factors.   Barriers to treatment: None identified  Patient Contracts (see media tab):  None  Substance Use: Not addressed in session   Continue or Discharge: Patient will continue in Adult Day Treatment (ADT)  as planned. Patient is likely to benefit from learning and using skills as they work toward the goals identified in their treatment plan.      Mela Whiteside, LICSW  September 23, 2024

## 2024-09-23 NOTE — GROUP NOTE
Psychotherapy Group Note    PATIENT'S NAME: Lora Cardenas  MRN:   [id number]  :   1975  ACCT. NUMBER: [id number]  DATE OF SERVICE: 24  START TIME: 10:00 AM  END TIME: 10:50 AM  FACILITATOR: Mela Olsen LICSW  TOPIC: MH EBP Group: Coping Skills  [facility]  TRACK: IOP 1    NUMBER OF PARTICIPANTS: 6    Summary of Group / Topics Discussed:  Coping Skills: Additional Coping Skills:  Patients discussed the mental health benefits of time in nature.  Reviewed the benefits of applying the aforementioned coping strategies.  Patients explored how these strategies might be applied to daily stressors or distressing situations.    Patient Session Goals / Objectives:  Understand the purpose and benefits of applying nature coping strategies  Discussed ways to increase time outdoors  Address barriers to utilizing coping skills when in distress.      Patient Participation / Response:  Fully participated with the group by sharing personal reflections / insights and openly received / provided feedback with other participants.    Demonstrated understanding of topics discussed through group discussion and participation, Expressed understanding of the relevance / importance of coping skills at distressing times in life, and Demonstrated knowledge of when to consider using a variety of coping skills in daily life    Treatment Plan:  Patient has a current master individualized treatment plan.  See Epic treatment plan for more information.    BRYNN Christiansen

## 2024-09-23 NOTE — GROUP NOTE
Psychotherapy Group Note    PATIENT'S NAME: Lora Cardenas  MRN:   [id number]  :   1975  ACCT. NUMBER: [id number]  DATE OF SERVICE: 24  START TIME: 11:00 AM  END TIME: 11:50 AM  FACILITATOR: Lynn Ibrahim LICSW  TOPIC: MH EBP Group: Behavioral Activation  [facility]  TRACK: IOP 1    NUMBER OF PARTICIPANTS: 6    Summary of Group / Topics Discussed:  Life Skills:  Decision Making:  Patient's reviewed a process for decision making and strategies for making effective decisions.  Barriers to decision making were discussed    Patient Session Goals / Objectives:       Review process for making decisions       Patients learn strategies for making effective decisions       Patients wrote out a pros and cons list for a decision and identified barriers to decision making.      Patient Participation / Response:  Fully participated with the group by sharing personal reflections / insights and openly received / provided feedback with other participants.    Demonstrated understanding of topics discussed through group discussion and participation, Expressed understanding of the relationship between behaviors, thoughts, and feelings, Shared experiences and challenges with making behavioral changes, Identified barriers to change, Identified / Expressed personal readiness to make behavioral change, and Identified ways to increase goal directed activities    Treatment Plan:  Patient has a current master individualized treatment plan.  See Epic treatment plan for more information.    BRYNN Balderrama

## 2024-09-24 ENCOUNTER — HOSPITAL ENCOUNTER (OUTPATIENT)
Dept: BEHAVIORAL HEALTH | Facility: CLINIC | Age: 49
Discharge: HOME | End: 2024-09-24
Attending: PSYCHIATRY & NEUROLOGY
Payer: COMMERCIAL

## 2024-09-24 PROCEDURE — 90853 GROUP PSYCHOTHERAPY: CPT

## 2024-09-24 NOTE — GROUP NOTE
Process Group Note    PATIENT'S NAME: Lora Cardenas  MRN:   [id number]  :   1975  ACCT. NUMBER: [id number]  DATE OF SERVICE: 24  START TIME:  9:00 AM  END TIME:  9:50 AM  FACILITATOR: Mela Olsen LICSW  TOPIC:  Process Group    Diagnoses:     296.33 (F33.2) Major Depressive Disorder, Recurrent Episode, Severe _ and With anxious distress.    [facility]  TRACK: IOP 1    NUMBER OF PARTICIPANTS: 9        Data:    Session content: At the start of this group, patients were invited to check in by identifying themselves, describing their current emotional status, and identifying issues to address in this group.   Area(s) of treatment focus addressed in this session included Symptom Management and Personal Safety.  Pt reports poor sleep and an inability to stop crying last night. She feels grateful that her  was able to comfort her in the middle of the night. Discussed sleep hygiene. Pt continues to ruminate about estrangement from son, saying that a lot of things that he said in the letter, were not true. She denies safety concerns.    Therapeutic Interventions/Treatment Strategies:  Psychotherapist offered support, feedback and validation, provided redirection, and reinforced use of skills. Treatment modalities used include Cognitive Behavioral Therapy. Interventions include Cognitive Restructuring:  Explored impact of ineffective thoughts / distortions on mood and activity and Assisted patient in identifying new neutral/positive core beliefs and Coping Skills: Discussed use of self-soothe skills to decrease distress in the body.    Assessment:    Patient response:   Patient responded to session by accepting feedback, giving feedback, listening, focusing on goals, being attentive, and accepting support    Possible barriers to participation / learning include: and no barriers identified    Health Issues:   None reported       Substance Use  Review:   Substance Use: No active concerns identified.    Mental Status/Behavioral Observations  Appearance:   Appropriate   Eye Contact:   Good   Psychomotor Behavior: Normal   Attitude:   Cooperative   Orientation:   All  Speech   Rate / Production: Normal    Volume:  Normal   Mood:    Anxious  Depressed  Grieving  Affect:    Worrisome   Thought Content:   Rumination  Thought Form:  Coherent  Obsessive     Insight:    Good     Plan:   Safety Plan: No current safety concerns identified.  Recommended that patient call 911 or go to the local ED should there be a change in any of these risk factors.   Barriers to treatment: None identified  Patient Contracts (see media tab):  None  Substance Use: Not addressed in session   Continue or Discharge: Patient will continue in Adult Day Treatment (ADT)  as planned. Patient is likely to benefit from learning and using skills as they work toward the goals identified in their treatment plan.      Mela Whiteside, [facility] Medical CenterSW  September 24, 2024

## 2024-09-24 NOTE — GROUP NOTE
Psychotherapy Group Note    PATIENT'S NAME: Lora Cardenas  MRN:   [id number]  :   1975  ACCT. NUMBER: [id number]  DATE OF SERVICE: 24  START TIME: 10:00 AM  END TIME: 10:50 AM  FACILITATOR: Mela Olsen LICSW  TOPIC: MH EBP Group: Coping Skills  [facility]  TRACK: IOP 1    NUMBER OF PARTICIPANTS: 8    Summary of Group / Topics Discussed:  Coping Skills: Additional Coping Skills:  Patients discussed the mental health benefits of nature and identified nature connection activities.  Reviewed the benefits of applying the aforementioned coping strategies.  Patients explored how these strategies might be applied to daily stressors or distressing situations.    Patient Session Goals / Objectives:  Understand the purpose and benefits of applying nature coping strategies  Identify nature connection activities  Address barriers to utilizing coping skills when in distress.      Patient Participation / Response:  Fully participated with the group by sharing personal reflections / insights and openly received / provided feedback with other participants.    Demonstrated understanding of topics discussed through group discussion and participation, Expressed understanding of the relevance / importance of coping skills at distressing times in life, and Demonstrated knowledge of when to consider using a variety of coping skills in daily life    Treatment Plan:  Patient has a current master individualized treatment plan.  See Epic treatment plan for more information.    BRYNN Christiansen

## 2024-09-24 NOTE — GROUP NOTE
Psychotherapy Group Note    PATIENT'S NAME: Lora Cardenas  MRN:   [id number]  :   1975  ACCT. NUMBER: [id number]  DATE OF SERVICE: 24  START TIME: 11:00 AM  END TIME: 11:50 AM  FACILITATOR: Lynn Ibrahim LICSW  TOPIC: MH EBP Group: Behavioral Activation  [facility]  TRACK: IOP 1    NUMBER OF PARTICIPANTS: 8    Summary of Group / Topics Discussed:  Life Skills:  Decision Making part 2:  Patient's reviewed a process for decision making and strategies for making effective decisions.  Barriers to decision making were discussed    Patient Session Goals / Objectives:       Review process for making decisions       Patients learn strategies for making effective decisions       Patients wrote out a pros and cons list for a decision and identified barriers to decision making.      Patient Participation / Response:  Fully participated with the group by sharing personal reflections / insights and openly received / provided feedback with other participants.    Demonstrated understanding of topics discussed through group discussion and participation, Expressed understanding of the relationship between behaviors, thoughts, and feelings, Shared experiences and challenges with making behavioral changes, Identified barriers to change, Identified / Expressed personal readiness to make behavioral change, and Practiced skills in session    Treatment Plan:  Patient has a current master individualized treatment plan.  See Epic treatment plan for more information.    BRYNN Balderrama

## 2024-09-25 ENCOUNTER — HOSPITAL ENCOUNTER (OUTPATIENT)
Dept: BEHAVIORAL HEALTH | Facility: CLINIC | Age: 49
Discharge: HOME | End: 2024-09-25
Attending: PSYCHIATRY & NEUROLOGY
Payer: COMMERCIAL

## 2024-09-25 PROCEDURE — 90853 GROUP PSYCHOTHERAPY: CPT

## 2024-09-25 NOTE — GROUP NOTE
Psychoeducation Group Note    PATIENT'S NAME: Lora Cardenas  MRN:   [id number]  :   1975  ACCT. NUMBER: [id number]  DATE OF SERVICE: 24  START TIME: 10:00 AM  END TIME: 10:50 AM  FACILITATOR: Mela Olsen LICSW  TOPIC:  Wellness Group: [facility]  TRACK: IOP 1    NUMBER OF PARTICIPANTS: 7    Summary of Group / Topics Discussed:  Foundations of Health: Exercise: Why exercise: Patients evaluated and discussed their current exercise behaviors/physical activity routine and identified barriers/obstacles to meeting daily physical activity recommendations. Patients were educated on the four types of exercise: endurance, strength, balance, and flexibility. Patients were educated on the benefits of getting daily physical activity, safety tips for beginning an exercise program, and fitness goal setting strategies.     Patient Session Goals / Objectives:  Explained the emotional and physical benefits of exercise  Identified how exercise and activity affects bodily function  Listed ways to incorporate exercise into daily routine      Patient Participation / Response:  Fully participated with the group by sharing personal reflections / insights and openly received / provided feedback with other participants.    Demonstrated understanding of topics discussed through group discussion and participation, Identified / Expressed personal readiness to practice skills, and Verbalized understanding of foundations of health topic    Treatment Plan:  Patient has a current master individualized treatment plan.  See Epic treatment plan for more information.    BRYNN Christiansen

## 2024-09-25 NOTE — GROUP NOTE
Psychoeducation Group Note    PATIENT'S NAME: Lora Cardenas  MRN:   [id number]  :   1975  ACCT. NUMBER: [id number]  DATE OF SERVICE: 24  START TIME: 11:00 AM  END TIME: 11:50 AM  FACILITATOR: Lynn Ibrahim LICSW  TOPIC: MH Wellness Group: Mental Health Maintenance  [facility]  TRACK: IOP 1    NUMBER OF PARTICIPANTS: 6    Summary of Group / Topics Discussed:  Mental Health Maintenance: Trust: In this group, the concept trust and self-trust was explored through the discussion and self-reflection of the Art Arriaza's Anatomy of Trust     Patient Session Goals / Objectives:  Defined and described definition of Art Arriaza's components of trust (via BRAVING acronym)   Identified 2 or more ways of building trust       Patient Participation / Response:  Fully participated with the group by sharing personal reflections / insights and openly received / provided feedback with other participants.    Demonstrated understanding of topics discussed through group discussion and participation, Identified / Expressed personal readiness to practice skills, and Verbalized understanding of mental health maintenance topic    Treatment Plan:  Patient has a current master individualized treatment plan.  See Epic treatment plan for more information.    BRYNN Balderrama

## 2024-09-25 NOTE — GROUP NOTE
"Process Group Note    PATIENT'S NAME: Lora Cardenas  MRN:   [id number]  :   1975  ACCT. NUMBER: [id number]  DATE OF SERVICE: 24  START TIME:  9:00 AM  END TIME:  9:50 AM  FACILITATOR: Mela Olsen LICSW  TOPIC:  Process Group    Diagnoses:     296.33 (F33.2) Major Depressive Disorder, Recurrent Episode, Severe _ and With anxious distress.    [facility]  TRACK: IOP 1    NUMBER OF PARTICIPANTS: 7        Data:    Session content: At the start of this group, patients were invited to check in by identifying themselves, describing their current emotional status, and identifying issues to address in this group.   Area(s) of treatment focus addressed in this session included Symptom Management and Personal Safety.  Pt reports feeling a little better today. She states \"it has been a hard week and I am courageous\". She identifies coping skill as nature. She is looking forward to a bonfire tonight. Pt reports improved sleep last night and trying to determine a good sleep routine. Denies safety concerns.    Therapeutic Interventions/Treatment Strategies:  Psychotherapist offered support, feedback and validation and reinforced use of skills. Treatment modalities used include Cognitive Behavioral Therapy. Interventions include Coping Skills: Discussed use of self-soothe skills to decrease distress in the body, Assisted patient in identifying 1-2 healthy distraction skills to reduce overall distress, Discussed how the use of intentional \"in the moment\" actions can help reduce distress, and Reviewed patients current calming practices and discussed a more formal way of practicing and accessing skills.    Assessment:    Patient response:   Patient responded to session by accepting feedback, giving feedback, listening, focusing on goals, being attentive, and accepting support    Possible barriers to participation / learning include: and no barriers " identified    Health Issues:   None reported       Substance Use Review:   Substance Use: No active concerns identified.    Mental Status/Behavioral Observations  Appearance:   Appropriate   Eye Contact:   Good   Psychomotor Behavior: Normal   Attitude:   Cooperative   Orientation:   All  Speech   Rate / Production: Normal    Volume:  Normal   Mood:    Anxious  Depressed   Affect:    Worrisome   Thought Content:   Rumination  Thought Form:  Coherent  Logical     Insight:    Good     Plan:   Safety Plan: No current safety concerns identified.  Recommended that patient call 911 or go to the local ED should there be a change in any of these risk factors.   Barriers to treatment: None identified  Patient Contracts (see media tab):  None  Substance Use: Not addressed in session   Continue or Discharge: Patient will continue in Adult Day Treatment (ADT)  as planned. Patient is likely to benefit from learning and using skills as they work toward the goals identified in their treatment plan.      Mela Whiteside, BRYNN  September 25, 2024

## 2024-09-26 ENCOUNTER — HOSPITAL ENCOUNTER (OUTPATIENT)
Dept: BEHAVIORAL HEALTH | Facility: CLINIC | Age: 49
Discharge: HOME | End: 2024-09-26
Attending: PSYCHIATRY & NEUROLOGY
Payer: COMMERCIAL

## 2024-09-26 PROCEDURE — 90853 GROUP PSYCHOTHERAPY: CPT

## 2024-09-26 NOTE — GROUP NOTE
Psychoeducation Group Note    PATIENT'S NAME: Lora Cardenas  MRN:   [id number]  :   1975  ACCT. NUMBER: [id number]  DATE OF SERVICE: 24  START TIME: 10:00 AM  END TIME: 10:50 AM  FACILITATOR: Mela Olsen LICSW  TOPIC:  Wellness Group: [facility]  TRACK: IOP 1    NUMBER OF PARTICIPANTS: 7    Summary of Group / Topics Discussed:  Foundations of Health: Exercise part 2: Why exercise: Patients evaluated and discussed their current exercise behaviors/physical activity routine and identified barriers/obstacles to meeting daily physical activity recommendations. Patients were educated on the four types of exercise: endurance, strength, balance, and flexibility. Patients were educated on the benefits of getting daily physical activity, safety tips for beginning an exercise program, and fitness goal setting strategies.     Patient Session Goals / Objectives:  Explained the emotional and physical benefits of exercise  Identified how exercise and activity affects bodily function  Listed ways to incorporate exercise into daily routine      Patient Participation / Response:  Fully participated with the group by sharing personal reflections / insights and openly received / provided feedback with other participants.    Demonstrated understanding of topics discussed through group discussion and participation, Identified / Expressed personal readiness to practice skills, and Verbalized understanding of foundations of health topic    Treatment Plan:  Patient has a current master individualized treatment plan.  See Epic treatment plan for more information.    BRYNN Christiansen

## 2024-09-26 NOTE — GROUP NOTE
Psychoeducation Group Note    PATIENT'S NAME: Lora Cardenas  MRN:   [id number]  :   1975  ACCT. NUMBER: [id number]  DATE OF SERVICE: 24  START TIME: 11:00 AM  END TIME: 11:50 AM  FACILITATOR: Lynn Ibrahim LICSW  TOPIC: MH Wellness Group: Health Maintenance  [facility] Adult [facility] Outpatient Programs  TRACK: IOP 1    NUMBER OF PARTICIPANTS: 8    Summary of Group / Topics Discussed:  Health Maintenance: Weekend planning: Patients were given time to complete a weekend plan of what they will do to promote wellness and sobriety over the weekend when they do not have the structure of group. Patients were encouraged to review progress on their treatment goals and were challenged to identify ways to work toward meeting them. Patients identified and discussed foreseeable barriers to success over the weekend and then developed a plan to overcome them. Patients reviewed their distress coping skills and social support network and discussed this with the group.       Patient Session Goals / Objectives:    ?    Identified one CBT/Mindfulness tool to use this weekend  ?    Distinguished possible barriers to success over the weekend and created a plan to overcome them  ?    Listed distress coping skills and identified social support network to utilize if in crisis during the weekend        Patient Participation / Response:  Fully participated with the group by sharing personal reflections / insights and openly received / provided feedback with other participants.    Demonstrated understanding of topics discussed through group discussion and participation, Identified / Expressed personal readiness to practice skills, and Verbalized understanding of health maintenance topic    Treatment Plan:  Patient has a current master individualized treatment plan.  See Epic treatment plan for more information.    BRYNN Balderrama

## 2024-09-26 NOTE — GROUP NOTE
"Process Group Note    PATIENT'S NAME: Lora Cardenas  MRN:   [id number]  :   1975  ACCT. NUMBER: [id number]  DATE OF SERVICE: 24  START TIME:  9:00 AM  END TIME:  9:50 AM  FACILITATOR: Mela Olsen LICSW  TOPIC:  Process Group    Diagnoses:     296.33 (F33.2) Major Depressive Disorder, Recurrent Episode, Severe _ and With anxious distress.    [facility] Outpatient [facility]  TRACK: IOP 1    NUMBER OF PARTICIPANTS: 8        Data:    Session content: At the start of this group, patients were invited to check in by identifying themselves, describing their current emotional status, and identifying issues to address in this group.   Area(s) of treatment focus addressed in this session included Symptom Management and Personal Safety.  Pt reports that this was the first day that she wanted to come to group. She reports feeling grateful and somewhat optimistic. Pt enjoyed time with friends last night and was careful about what she shared with them. She reports improved sleep. Pt identifies coping skills as: distraction, grounding and prayer. Denies safety concerns.    Therapeutic Interventions/Treatment Strategies:  Psychotherapist offered support, feedback and validation and reinforced use of skills. Treatment modalities used include Cognitive Behavioral Therapy. Interventions include Coping Skills: Discussed use of self-soothe skills to decrease distress in the body, Assisted patient in identifying 1-2 healthy distraction skills to reduce overall distress, Discussed how the use of intentional \"in the moment\" actions can help reduce distress, and Reviewed patients current calming practices and discussed a more formal way of practicing and accessing skills and Relationship Skills: Encouraged development and maintenance  of healthy boundaries.    Assessment:    Patient response:   Patient responded to session by accepting feedback, giving feedback, listening, focusing on goals, " being attentive, and accepting support    Possible barriers to participation / learning include: and no barriers identified    Health Issues:   None reported       Substance Use Review:   Substance Use: No active concerns identified.    Mental Status/Behavioral Observations  Appearance:   Appropriate   Eye Contact:   Good   Psychomotor Behavior: Normal   Attitude:   Cooperative   Orientation:   All  Speech   Rate / Production: Normal    Volume:  Normal   Mood:    Anxious  Depressed   Affect:    Appropriate   Thought Content:   Clear  Thought Form:  Coherent  Logical     Insight:    Good     Plan:   Safety Plan: No current safety concerns identified.  Recommended that patient call 911 or go to the local ED should there be a change in any of these risk factors.   Barriers to treatment: None identified  Patient Contracts (see media tab):  None  Substance Use: Not addressed in session   Continue or Discharge: Patient will continue in Adult Day Treatment (ADT)  as planned. Patient is likely to benefit from learning and using skills as they work toward the goals identified in their treatment plan.      Mela Whiteside, NURYSSW  September 26, 2024

## 2024-09-30 ENCOUNTER — HOSPITAL ENCOUNTER (OUTPATIENT)
Dept: BEHAVIORAL HEALTH | Facility: CLINIC | Age: 49
Discharge: HOME | End: 2024-09-30
Attending: PSYCHIATRY & NEUROLOGY
Payer: COMMERCIAL

## 2024-09-30 PROCEDURE — 90853 GROUP PSYCHOTHERAPY: CPT

## 2024-09-30 NOTE — GROUP NOTE
Psychoeducation Group Note    PATIENT'S NAME: Lora Cardenas  MRN:   [id number]  :   1975  ACCT. NUMBER: [id number]  DATE OF SERVICE: 24  START TIME: 10:00 AM  END TIME: 10:50 AM  FACILITATOR: Mela Olsen LICSW  TOPIC: MH Wellness Group: Health Maintenance  [facility] Mental [facility] Outpatient Programs  TRACK: IOP 1    NUMBER OF PARTICIPANTS: 5    Summary of Group / Topics Discussed:  Health Maintenance: Goal Setting: Meaningful goals can bring a sense of direction and purpose in life.  They also highlight our most important values. Patients were assisted by instructor to identify short term goals to promote their mental health recovery and improve overall health and wellness. Patients were educated on SMART goal setting framework as a strategy to increase outcomes and promote success.    Patient Session Goals / Objectives:  Explained the key concepts of SMART goal setting framework  Identified three goals successfully using SMART goal setting framework  Reviewed concept of balance in wellness as it pertains to goal setting        Patient Participation / Response:  Fully participated with the group by sharing personal reflections / insights and openly received / provided feedback with other participants.    Demonstrated understanding of topics discussed through group discussion and participation, Identified / Expressed personal readiness to practice skills, and Verbalized understanding of health maintenance topic    Treatment Plan:  Patient has a current master individualized treatment plan.  See Epic treatment plan for more information.    BRYNN Christiansen

## 2024-09-30 NOTE — GROUP NOTE
Psychotherapy Group Note    PATIENT'S NAME: Lora Cardenas  MRN:   [id number]  :   1975  ACCT. NUMBER: [id number]  DATE OF SERVICE: 24  START TIME: 11:00 AM  END TIME: 11:50 AM  FACILITATOR: Lynn Ibrahim LICSW  TOPIC: MH EBP Group: Specialty Awareness  [facility]  TRACK: IOP 1    NUMBER OF PARTICIPANTS: 5    Summary of Group / Topics Discussed:  Specialty Topics: Hope: The topic of hope was presented in order to help patients better understand the symptoms of hopelessness and how to become more hopeful. Patients discussed their current awareness of the topic and relevance to their functioning. Individual experiences with symptoms and treatment options were also discussed. Patients explored options for ongoing/future treatment and symptom management.      Patient Session Goals / Objectives:  Discussed definition of hopelessness  Discussed how hopelessness impacts functioning  Set a plan to utilize skills to reduce hopelessness      Patient Participation / Response:  Fully participated with the group by sharing personal reflections / insights and openly received / provided feedback with other participants.    Demonstrated understanding of topics discussed through group discussion and participation, Identified / Expressed readiness to act on skill suggestions discussed in topic, Verbalized understanding of ways to proactively manage illness, and Identified plan to address barriers to practicing skills discussed in topic    Treatment Plan:  Patient has a current master individualized treatment plan.  See Epic treatment plan for more information.    BRYNN Balderrama

## 2024-09-30 NOTE — GROUP NOTE
"Process Group Note    PATIENT'S NAME: Lora Cardenas  MRN:   [id number]  :   1975  ACCT. NUMBER: [id number]  DATE OF SERVICE: 24  START TIME:  9:00 AM  END TIME:  9:50 AM  FACILITATOR: Mela Olsen LICSW  TOPIC:  Process Group    Diagnoses:     296.33 (F33.2) Major Depressive Disorder, Recurrent Episode, Severe _ and With anxious distress.    [facility] Programs  TRACK: IOP 1    NUMBER OF PARTICIPANTS: 5        Data:    Session content: At the start of this group, patients were invited to check in by identifying themselves, describing their current emotional status, and identifying issues to address in this group.   Area(s) of treatment focus addressed in this session included Symptom Management and Personal Safety.  Pt reports feeling weary today. She worries that others are tip-toeing around her. Pt identifies coping skills as: gratitude, positive affirmations and nature. She is proud of avoiding alcohol this wknd. She enjoyed going to [employer] yesterday. Pt sets a goal to keep balance in her life. Denies safety concerns.    Therapeutic Interventions/Treatment Strategies:  Psychotherapist offered support, feedback and validation and reinforced use of skills. Treatment modalities used include Cognitive Behavioral Therapy. Interventions include Behavioral Activation: Explored how behaviors effect mood and interact with thoughts and feelings, Coping Skills: Discussed how the use of intentional \"in the moment\" actions can help reduce distress, Reviewed patients current calming practices and discussed a more formal way of practicing and accessing skills, and Promoted understanding of how and when to apply grounding strategies to reduce distress and increase presence in the moment, and Relationship Skills: Encouraged development and maintenance  of healthy boundaries.    Assessment:    Patient response:   Patient responded to session by accepting feedback, giving " feedback, listening, focusing on goals, being attentive, and accepting support    Possible barriers to participation / learning include: and no barriers identified    Health Issues:   None reported       Substance Use Review:   Substance Use: No active concerns identified.    Mental Status/Behavioral Observations  Appearance:   Appropriate   Eye Contact:   Good   Psychomotor Behavior: Normal   Attitude:   Cooperative   Orientation:   All  Speech   Rate / Production: Normal    Volume:  Normal   Mood:    Anxious  Depressed   Affect:    Worrisome   Thought Content:   Clear  Thought Form:  Coherent  Logical     Insight:    Good     Plan:   Safety Plan: No current safety concerns identified.  Recommended that patient call 911 or go to the local ED should there be a change in any of these risk factors.   Barriers to treatment: None identified  Patient Contracts (see media tab):  None  Substance Use: Not addressed in session   Continue or Discharge: Patient will continue in Adult Day Treatment (ADT)  as planned. Patient is likely to benefit from learning and using skills as they work toward the goals identified in their treatment plan.      Mela Whiteside, LincolnHealthSW  September 30, 2024

## 2024-10-01 ENCOUNTER — HOSPITAL ENCOUNTER (OUTPATIENT)
Dept: BEHAVIORAL HEALTH | Facility: CLINIC | Age: 49
Discharge: HOME | End: 2024-10-01
Attending: PSYCHIATRY & NEUROLOGY
Payer: COMMERCIAL

## 2024-10-01 PROCEDURE — 90853 GROUP PSYCHOTHERAPY: CPT

## 2024-10-01 NOTE — GROUP NOTE
"Process Group Note    PATIENT'S NAME: Lora Cardenas  MRN:   [id number]  :   1975  ACCT. NUMBER: [id number]  DATE OF SERVICE: 10/01/24  START TIME:  9:00 AM  END TIME:  9:50 AM  FACILITATOR: Mela Olsen LICSW  TOPIC:  Process Group    Diagnoses:     296.33 (F33.2) Major Depressive Disorder, Recurrent Episode, Severe _ and With anxious distress.    [facility] Outpatient Programs  TRACK: IOP 1    NUMBER OF PARTICIPANTS: 6        Data:    Session content: At the start of this group, patients were invited to check in by identifying themselves, describing their current emotional status, and identifying issues to address in this group.   Area(s) of treatment focus addressed in this session included Symptom Management and Personal Safety.  Pt states \"I cannot shake this feeling of estrangement from my son\". She does report feeling courageous and empowered today though. She reports  a good night's sleep. Pt identifies coping skills as: deep breathing and giving herself renita. Pt sets a goal keep the momentum going. She is grateful for family dinner last night. She sets a goal to edit photos and denies safety concerns.    Therapeutic Interventions/Treatment Strategies:  Psychotherapist offered support, feedback and validation and reinforced use of skills. Treatment modalities used include Cognitive Behavioral Therapy. Interventions include Coping Skills: Discussed use of self-soothe skills to decrease distress in the body, Discussed how the use of intentional \"in the moment\" actions can help reduce distress, Reviewed patients current calming practices and discussed a more formal way of practicing and accessing skills, Promoted understanding of how and when to apply grounding strategies to reduce distress and increase presence in the moment, and Assisted patient in understanding the purpose of planning / creating / participating / sharing in positive experiences and Symptoms " Management: Promoted understanding of their diagnoses and how it impacts their functioning.    Assessment:    Patient response:   Patient responded to session by accepting feedback, giving feedback, listening, focusing on goals, being attentive, and accepting support    Possible barriers to participation / learning include: and no barriers identified    Health Issues:   None reported       Substance Use Review:   Substance Use: No active concerns identified.    Mental Status/Behavioral Observations  Appearance:   Appropriate   Eye Contact:   Good   Psychomotor Behavior: Normal   Attitude:   Cooperative   Orientation:   All  Speech   Rate / Production: Normal    Volume:  Normal   Mood:    Sad   Affect:    Appropriate   Thought Content:   Clear  Thought Form:  Coherent  Logical     Insight:    Good     Plan:   Safety Plan: No current safety concerns identified.  Recommended that patient call 911 or go to the local ED should there be a change in any of these risk factors.   Barriers to treatment: None identified  Patient Contracts (see media tab):  None  Substance Use: Not addressed in session   Continue or Discharge: Patient will continue in Adult Day Treatment (ADT)  as planned. Patient is likely to benefit from learning and using skills as they work toward the goals identified in their treatment plan.      Mela Whiteside, LICSW  October 1, 2024

## 2024-10-01 NOTE — GROUP NOTE
Psychotherapy Group Note    PATIENT'S NAME: Lora Cardenas  MRN:   [id number]  :   1975  ACCT. NUMBER: [id number]  DATE OF SERVICE: 10/01/24  START TIME: 11:00 AM  END TIME: 11:50 AM  FACILITATOR: Lynn Ibrahim LICSW  TOPIC: MH EBP Group: Specialty Awareness  [facility] Adult [facility] Outpatient [facility]  TRACK: IOP 1    NUMBER OF PARTICIPANTS: 6    Summary of Group / Topics Discussed:  Specialty Topics: Hope part 2: The topic of hope was presented in order to help patients better understand the symptoms of hopelessness and how to become more hopeful. Patients discussed their current awareness of the topic and relevance to their functioning. Individual experiences with symptoms and treatment options were also discussed. Patients explored options for ongoing/future treatment and symptom management.      Patient Session Goals / Objectives:  Discussed definition of hopelessness  Discussed how hopelessness impacts functioning  Set a plan to utilize skills to reduce hopelessness      Patient Participation / Response:  Fully participated with the group by sharing personal reflections / insights and openly received / provided feedback with other participants.    Demonstrated understanding of topics discussed through group discussion and participation, Identified / Expressed readiness to act on skill suggestions discussed in topic, Verbalized understanding of ways to proactively manage illness, and Identified plan to address barriers to practicing skills discussed in topic    Treatment Plan:  Patient has a current master individualized treatment plan.  See Epic treatment plan for more information.    BRYNN Balderrama

## 2024-10-01 NOTE — GROUP NOTE
Psychotherapy Group Note    PATIENT'S NAME: Lora Cardenas  MRN:   [id number]  :   1975  ACCT. NUMBER: [id number]  DATE OF SERVICE: 10/01/24  START TIME: 10:00 AM  END TIME: 10:50 AM  FACILITATOR: Mela Olsen LICSW  TOPIC: MH EBP Group: Self-Awareness  [facility] Adult Mental Health Outpatient Programs  TRACK: IOP 1    NUMBER OF PARTICIPANTS: 5    Summary of Group / Topics Discussed:  Self-Awareness: Personal Strengths: Topic focused on assisting patients in identifying personal strengths and how they relate to the management of mental health symptoms. Patients discussed the benefits of acknowledging their personal strengths and their impact on mood improvement, mindfulness, and perspective. Patients worked to increase time spent on recognition and appreciation of what is positive and working in their lives. The goal is to reduce rumination and negative thinking resulting in increased mindfulness and resilience. Patients will work to put skills into practice and problem-solve barriers.     Patient Session Goals / Objectives:  Identified personal strengths  Identified barriers to recognition of personal strengths  Verbalized understanding of strategies to increase use of their strengths in management of daily symptoms      Patient Participation / Response:  Fully participated with the group by sharing personal reflections / insights and openly received / provided feedback with other participants.    Demonstrated understanding of topics discussed through group discussion and participation, Demonstrated understanding of values, strengths, and challenges to learn about themselves, and Identified / Expressed readiness to act intentionally, increase self-compassion, promote personal growth    Treatment Plan:  Patient has a current master individualized treatment plan.  See Epic treatment plan for more information.    BRYNN Christiansen

## 2024-10-02 ENCOUNTER — HOSPITAL ENCOUNTER (OUTPATIENT)
Dept: BEHAVIORAL HEALTH | Facility: CLINIC | Age: 49
Discharge: HOME | End: 2024-10-02
Attending: PSYCHIATRY & NEUROLOGY
Payer: COMMERCIAL

## 2024-10-02 PROCEDURE — 90853 GROUP PSYCHOTHERAPY: CPT

## 2024-10-02 NOTE — GROUP NOTE
Psychotherapy Group Note    PATIENT'S NAME: Lora Cardenas  MRN:   [id number]  :   1975  ACCT. NUMBER: [id number]  DATE OF SERVICE: 10/02/24  START TIME: 10:00 AM  END TIME: 10:50 AM  FACILITATOR: Mela Olsen LICSW  TOPIC: MH EBP Group: Self-Awareness  [facility] Adult Mental Health Outpatient Programs  TRACK: IOP 1    NUMBER OF PARTICIPANTS: 7    Summary of Group / Topics Discussed:  Self-Awareness: Personal Strengths: Topic focused on assisting patients in identifying personal strengths and how they relate to the management of mental health symptoms. Patients discussed the benefits of acknowledging their personal strengths and their impact on mood improvement, mindfulness, and perspective. Patients worked to increase time spent on recognition and appreciation of what is positive and working in their lives. The goal is to reduce rumination and negative thinking resulting in increased mindfulness and resilience. Patients will work to put skills into practice and problem-solve barriers.     Patient Session Goals / Objectives:  Identified personal strengths  Identified barriers to recognition of personal strengths  Verbalized understanding of strategies to increase use of their strengths in management of daily symptoms      Patient Participation / Response:  Fully participated with the group by sharing personal reflections / insights and openly received / provided feedback with other participants.    Demonstrated understanding of topics discussed through group discussion and participation, Demonstrated understanding of values, strengths, and challenges to learn about themselves, and Identified / Expressed readiness to act intentionally, increase self-compassion, promote personal growth    Treatment Plan:  Patient has a current master individualized treatment plan.  See Epic treatment plan for more information.    BRYNN Christiansen

## 2024-10-02 NOTE — GROUP NOTE
"Process Group Note    PATIENT'S NAME: Lora Cardenas  MRN:   [id number]  :   1975  ACCT. NUMBER: [id number]  DATE OF SERVICE: 10/02/24  START TIME:  9:00 AM  END TIME:  9:50 AM  FACILITATOR: Mela Olsen LICSW  TOPIC:  Process Group    Diagnoses:     296.33 (F33.2) Major Depressive Disorder, Recurrent Episode, Severe _ and With anxious distress.    [facility] Mental Health Outpatient Programs  TRACK: IOP 1    NUMBER OF PARTICIPANTS: 6        Data:    Session content: At the start of this group, patients were invited to check in by identifying themselves, describing their current emotional status, and identifying issues to address in this group.   Area(s) of treatment focus addressed in this session included Symptom Management and Personal Safety.  Pt reports that yesterday was the best that she has felt in months. She states \"when I came here I was below water and today I am starting to float\". Pt is feeling more hopeful and identifies a variety of coping skills used. She sets a goal to be in nature and set up a routine. Denies safety concerns.    Therapeutic Interventions/Treatment Strategies:  Psychotherapist offered support, feedback and validation and reinforced use of skills. Treatment modalities used include Cognitive Behavioral Therapy. Interventions include Behavioral Activation: Explored how behaviors effect mood and interact with thoughts and feelings and Coping Skills: Discussed how the use of intentional \"in the moment\" actions can help reduce distress, Reviewed patients current calming practices and discussed a more formal way of practicing and accessing skills, Promoted understanding of how and when to apply grounding strategies to reduce distress and increase presence in the moment, and Assisted patient in understanding the purpose of planning / creating / participating / sharing in positive experiences.    Assessment:    Patient response:   Patient responded to session by " accepting feedback, giving feedback, listening, focusing on goals, being attentive, and accepting support    Possible barriers to participation / learning include: and no barriers identified    Health Issues:   None reported       Substance Use Review:   Substance Use: No active concerns identified.    Mental Status/Behavioral Observations  Appearance:   Appropriate   Eye Contact:   Good   Psychomotor Behavior: Normal   Attitude:   Cooperative   Orientation:   All  Speech   Rate / Production: Normal    Volume:  Normal   Mood:    Normal  Affect:    Appropriate   Thought Content:   Clear  Thought Form:  Coherent  Logical     Insight:    Good     Plan:   Safety Plan: No current safety concerns identified.  Recommended that patient call 911 or go to the local ED should there be a change in any of these risk factors.   Barriers to treatment: None identified  Patient Contracts (see media tab):  None  Substance Use: Not addressed in session   Continue or Discharge: Patient will continue in Adult Day Treatment (ADT)  as planned. Patient is likely to benefit from learning and using skills as they work toward the goals identified in their treatment plan.      Mela Whiteside, LICSW  October 2, 2024

## 2024-10-02 NOTE — GROUP NOTE
Psychotherapy Group Note    PATIENT'S NAME: Lora Cardenas  MRN:   [id number]  :   1975  ACCT. NUMBER: [id number]  DATE OF SERVICE: 10/02/24  START TIME: 11:00 AM  END TIME: 11:50 AM  FACILITATOR: Lynn Ibrahim LICSW  TOPIC: MH EBP Group: Coping Skills  [facility]  TRACK: IOP 1    NUMBER OF PARTICIPANTS: 7    Summary of Group / Topics Discussed:  Coping Skills: Additional Coping Skills:  Patients discussed strategies to reduce loneliness.  Reviewed the benefits of applying the aforementioned coping strategies.  Patients explored how these strategies might be applied to daily stressors or distressing situations.    Patient Session Goals / Objectives:  Understand the purpose and benefits of applying coping strategies to reduce loneliness  Define and differentiate loneliness, isolation and solitude.  Brainstorm tangible action steps to reduce loneliness.  Address barriers to utilizing coping skills when in distress.      Patient Participation / Response:  Fully participated with the group by sharing personal reflections / insights and openly received / provided feedback with other participants.    Demonstrated understanding of topics discussed through group discussion and participation, Expressed understanding of the relevance / importance of coping skills at distressing times in life, Demonstrated knowledge of when to consider using a variety of coping skills in daily life, Identified barriers to applying coping skills, Identified 2-3 positive coping strategies that have helped maintain / improve symptoms in the past, and Identified / Expressed personal readiness to practice new coping skills    Treatment Plan:  Patient has a current master individualized treatment plan.  See Epic treatment plan for more information.    BRYNN Balderrama

## 2024-10-03 ENCOUNTER — HOSPITAL ENCOUNTER (OUTPATIENT)
Dept: BEHAVIORAL HEALTH | Facility: CLINIC | Age: 49
Discharge: HOME | End: 2024-10-03
Attending: PSYCHIATRY & NEUROLOGY
Payer: COMMERCIAL

## 2024-10-03 PROCEDURE — 90853 GROUP PSYCHOTHERAPY: CPT

## 2024-10-03 PROCEDURE — 90853 GROUP PSYCHOTHERAPY: CPT | Performed by: SOCIAL WORKER

## 2024-10-03 NOTE — GROUP NOTE
Psychotherapy Group Note    PATIENT'S NAME: Lora Cardenas  MRN:   [id number]  :   1975  ACCT. NUMBER: [id number]  DATE OF SERVICE: 10/03/24  START TIME: 10:00 AM  END TIME: 10:50 AM  FACILITATOR: Lynn Ibrahim LICSW  TOPIC: MH EBP Group: Emotions Management  [facility]  TRACK: IOP 1    NUMBER OF PARTICIPANTS: 8    Summary of Group / Topics Discussed:  Emotions Management: Anger: Patients explored and shared personal experiences associated with feelings of anger.  Group explored how these feelings develop, what they mean to each individual, and how to increase acceptance and usefulness of these feelings.  Discussed anger as a  secondary  emotion and reviewed ways to manage anger and challenge associated cognitive distortions. Group members worked to contextualize these concepts and promote healing.     Patient Session Goals / Objectives:  Discuss and review definitions and personal views/experiences with anger  Explore how feelings of anger impact functioning  Understand and practice strategies to manage difficult emotions and move towards healing  Demonstrate understanding of the feelings of anger  Verbalize how these emotions have impacted their lives/functioning  Verbalize of knowledge gained and possible interventions to manage feelings      Patient Participation / Response:  Fully participated with the group by sharing personal reflections / insights and openly received / provided feedback with other participants.    Demonstrated understanding of topics discussed through group discussion and participation, Expressed understanding of the relevance / importance of emotions management skills at distressing times in life, Self-aware of experiences with difficult emotions, and strategies to employ to manage them, Demonstrated knowledge of when to consider applying a variety of emotions management skills in daily life, Demonstrated understanding and practice  strategies to manage difficult emotions and move towards healing, Identified barriers to applying emotions management strategies, Identified strategies to overcome barriers to use of emotions management skills, and Identified emotions management strategies that have helped maintain / improve symptoms in the past    Treatment Plan:  Patient has a current master individualized treatment plan.  See Epic treatment plan for more information.    NURYS BalderramaSW

## 2024-10-03 NOTE — GROUP NOTE
"Process Group Note    PATIENT'S NAME: Lora Cardenas  MRN:   [id number]  :   1975  ACCT. NUMBER: [id number]  DATE OF SERVICE: 10/03/24  START TIME:  9:00 AM  END TIME:  9:50 AM  FACILITATOR: Natalie Murphy [facility]  TOPIC: MH Process Group    Diagnoses:  296.33 (F33.2) Major Depressive Disorder, Recurrent Episode, Severe _ and With anxious distress.     [facility]  TRACK: IOP 1    NUMBER OF PARTICIPANTS: 8        Data:    Session content: At the start of this group, patients were invited to check in by identifying themselves, describing their current emotional status, and identifying issues to address in this group.     Area(s) of treatment focus addressed in this session included Symptom Management, Personal Safety, Community Resources/Discharge Planning, Abstinence/Relapse Prevention, Develop / Improve Independent Living Skills, and Develop Socialization / Interpersonal Relationship Skills.    Lora reports less depressed mood stating \"Im good.\"  Denies S/I or safety issues. She processed practicing a mindful moment by savoring her cope of coffee for two hours then standing in the mist of the [place] waterfall. She reports using coping skills for symptom management including mindfulness, practicing gratitude and a here and now focus in nature. She being open to the transformation of healing. She feels grounded today.       Therapeutic Interventions/Treatment Strategies:  Psychotherapist offered support, feedback and validation and reinforced use of skills. Treatment modalities used include Cognitive Behavioral Therapy.    Assessment:    Patient response:   Patient responded to session by verbalizing understanding    Possible barriers to participation / learning include: and no barriers identified    Health Issues:   None reported. Reports medication compliance.        Substance Use Review:   Substance Use: No active concerns identified.    Mental " Status/Behavioral Observations  Appearance:   Appropriate   Eye Contact:   Good   Psychomotor Behavior: Normal   Attitude:   Cooperative  Interested  Orientation:   All  Speech   Rate / Production: Normal    Volume:  Normal   Mood:    Less depressed mood  Affect:    Appropriate   Thought Content:   Clear and Safety denies any current safety concerns including suicidal ideation, self-harm, and homicidal ideation  Thought Form:  Coherent  Goal Directed  Logical     Insight:    Good     Plan:   Safety Plan: No current safety concerns identified.  Recommended that patient call 911 or go to the local ED should there be a change in any of these risk factors.   Barriers to treatment: None identified  Patient Contracts (see media tab):  None  Substance Use: Not addressed in session   Continue or Discharge: Patient will continue in Adult Day Treatment (ADT)  as planned. Patient is likely to benefit from learning and using skills as they work toward the goals identified in their treatment plan.      Natalie Murphy, [facility] In[facility] HospitalSW  October 3, 2024

## 2024-10-03 NOTE — GROUP NOTE
Psychotherapy Group Note    PATIENT'S NAME: Lora Cardenas  MRN:   [id number]  :   1975  ACCT. NUMBER: [id number]  DATE OF SERVICE: 10/03/24  START TIME: 11:00 AM  END TIME: 11:50 AM  FACILITATOR: Lynn Ibrahim LICSW  TOPIC: MH EBP Group: Behavioral Activation  [facility]  TRACK: IOP 1    NUMBER OF PARTICIPANTS: 8    Summary of Group / Topics Discussed:  Behavioral Activation: Building Healthy Habits: Patients explored existing habits and how specific behaviors impact thoughts and feelings.  The group explored the effect of negative and positive activities on mood states and thought patterns.  Patients identified activities that help to improve mood and thinking patterns, and developed a plan to implement healthy behaviors.      Patient Session Goals / Objectives:  Identify impact of current habitual behaviors on thoughts and mood  Discuss tips & strategies to form and maintain helpful habits. (Building New Habits - Therapist Aid)  Identify 2-3 behavioral changes that could have a positive impact on thoughts and mood  Prepare to make desired behavioral change: Create a habit plan (Therapist Aid)      Patient Participation / Response:  Fully participated with the group by sharing personal reflections / insights and openly received / provided feedback with other participants.    Demonstrated understanding of topics discussed through group discussion and participation, Expressed understanding of the relationship between behaviors, thoughts, and feelings, Shared experiences and challenges with making behavioral changes, Identified barriers to change, Identified / Expressed personal readiness to make behavioral change, Identified ways to increase goal directed activities, Shared their progress tracking mood and activity, and Practiced skills in session    Treatment Plan:  Patient has a current master individualized treatment plan.  See Epic treatment plan for more  information.    Lynn Ibrahim, LICSW

## 2024-10-07 ENCOUNTER — HOSPITAL ENCOUNTER (OUTPATIENT)
Dept: BEHAVIORAL HEALTH | Facility: CLINIC | Age: 49
Discharge: HOME | End: 2024-10-07
Attending: PSYCHIATRY & NEUROLOGY
Payer: COMMERCIAL

## 2024-10-07 PROCEDURE — 90853 GROUP PSYCHOTHERAPY: CPT

## 2024-10-07 NOTE — GROUP NOTE
Psychotherapy Group Note    PATIENT'S NAME: Lora Cardenas  MRN:   [id number]  :   1975  ACCT. NUMBER: [id number]  DATE OF SERVICE: 10/07/24  START TIME: 10:00 AM  END TIME: 10:50 AM  FACILITATOR: Mela Olsen LICSW  TOPIC: MH EBP Group: Symptom Awareness  [facility]  TRACK: IOP 1    NUMBER OF PARTICIPANTS: 6    Summary of Group / Topics Discussed:  Symptom Awareness: Mood Disorders: Patients received a general overview of mood disorders including depressive disorders, anxiety disorders, and bipolar disorders and how it relates to their current symptoms. The purpose is to promote understanding of their diagnoses and how it impacts their functioning. Patients reviewed their current awareness of symptoms and diagnoses. Patients received information regarding diagnoses, etiology, cultural, and environmental factors as well as impact on functioning.     Patient Session Goals / Objectives:  Discussed patient individual symptoms and experiences  Reviewed diagnostic criteria and etiology of diagnoses       Patient Participation / Response:  Fully participated with the group by sharing personal reflections / insights and openly received / provided feedback with other participants.    Demonstrated understanding of topics discussed through group discussion and participation, Demonstrated understanding of how information regarding symptoms can assist in management of symptoms, and Identified / Expressed personal readiness to increase awareness of symptoms and apply skills as necessary    Treatment Plan:  Patient has a current master individualized treatment plan.  See Epic treatment plan for more information.    BRYNN Christiansen

## 2024-10-07 NOTE — GROUP NOTE
"Process Group Note    PATIENT'S NAME: Lora Cardenas  MRN:   [id number]  :   1975  ACCT. NUMBER: [id number]  DATE OF SERVICE: 10/07/24  START TIME:  9:00 AM  END TIME:  9:50 AM  FACILITATOR: Mela Olsen LICSW  TOPIC:  Process Group    Diagnoses:     296.33 (F33.2) Major Depressive Disorder, Recurrent Episode, Severe _ and With anxious distress.    [facility]  TRACK: IOP 1    NUMBER OF PARTICIPANTS: 6        Data:    Session content: At the start of this group, patients were invited to check in by identifying themselves, describing their current emotional status, and identifying issues to address in this group.   Area(s) of treatment focus addressed in this session included Symptom Management and Personal Safety.  Pt reports feeling \"overwhelmed but peaceful, like I am coming out of a fog\". Pt reports a struggle with self-criticism and would like to give herself renita. She identifies coping skills as: mindfulness, assertiveness and nature. She sets a goal to complete one task. She is proud of being here and denies safety concerns.    Therapeutic Interventions/Treatment Strategies:  Psychotherapist offered support, feedback and validation and reinforced use of skills. Treatment modalities used include Cognitive Behavioral Therapy. Interventions include Behavioral Activation: Explored how behaviors effect mood and interact with thoughts and feelings and Cognitive Restructuring:  Explored impact of ineffective thoughts / distortions on mood and activity and Assisted patient in identifying new neutral/positive core beliefs.    Assessment:    Patient response:   Patient responded to session by accepting feedback, giving feedback, listening, focusing on goals, being attentive, and accepting support    Possible barriers to participation / learning include: and no barriers identified    Health Issues:   None reported       Substance Use Review:   Substance Use: " No active concerns identified.    Mental Status/Behavioral Observations  Appearance:   Appropriate   Eye Contact:   Good   Psychomotor Behavior: Normal   Attitude:   Cooperative   Orientation:   All  Speech   Rate / Production: Normal    Volume:  Normal   Mood:    Anxious  Depressed   Affect:    Appropriate   Thought Content:   Clear  Thought Form:  Coherent  Logical     Insight:    Good     Plan:   Safety Plan: No current safety concerns identified.  Recommended that patient call 911 or go to the local ED should there be a change in any of these risk factors.   Barriers to treatment: None identified  Patient Contracts (see media tab):  None  Substance Use: Not addressed in session   Continue or Discharge: Patient will continue in Adult Day Treatment (ADT)  as planned. Patient is likely to benefit from learning and using skills as they work toward the goals identified in their treatment plan.      Mela Whiteside, LICSW  October 7, 2024

## 2024-10-07 NOTE — GROUP NOTE
Psychotherapy Group Note    PATIENT'S NAME: Lora Cardenas  MRN:   [id number]  :   1975  ACCT. NUMBER: [id number]  DATE OF SERVICE: 10/07/24  START TIME: 11:00 AM  END TIME: 11:50 AM  FACILITATOR: Lynn Ibrahim LICSW  TOPIC: MH EBP Group: Coping Skills  [facility]  TRACK: IOP 1    NUMBER OF PARTICIPANTS: 6    Summary of Group / Topics Discussed:  Coping Skills: Additional Coping Skills:  Patients discussed and practiced Wise Mind.  Reviewed the benefits of applying the aforementioned coping strategies.  Patients explored how these strategies might be applied to daily stressors or distressing situations.    Patient Session Goals / Objectives:  Understand the purpose and benefits of applying Wise Mind coping strategies  Applied wise mind strategies to current problems and stressors  Defined the three mind states: emotional, rational and wise.  Address barriers to utilizing coping skills when in distress.      Patient Participation / Response:  Fully participated with the group by sharing personal reflections / insights and openly received / provided feedback with other participants.    Demonstrated understanding of topics discussed through group discussion and participation, Expressed understanding of the relevance / importance of coping skills at distressing times in life, Demonstrated knowledge of when to consider using a variety of coping skills in daily life, Identified barriers to applying coping skills, and Identified 2-3 positive coping strategies that have helped maintain / improve symptoms in the past    Treatment Plan:  Patient has a current master individualized treatment plan.  See Epic treatment plan for more information.    BRYNN Balderrama

## 2024-10-08 ENCOUNTER — HOSPITAL ENCOUNTER (OUTPATIENT)
Dept: BEHAVIORAL HEALTH | Facility: CLINIC | Age: 49
Discharge: HOME | End: 2024-10-08
Attending: PSYCHIATRY & NEUROLOGY
Payer: COMMERCIAL

## 2024-10-08 PROCEDURE — 90853 GROUP PSYCHOTHERAPY: CPT

## 2024-10-08 NOTE — GROUP NOTE
"Process Group Note    PATIENT'S NAME: Lora Cardenas  MRN:   [id number]  :   1975  ACCT. NUMBER: [id number]  DATE OF SERVICE: 10/08/24  START TIME:  9:00 AM  END TIME:  9:50 AM  FACILITATOR: Mela Olsen LICSW  TOPIC:  Process Group    Diagnoses:     296.33 (F33.2) Major Depressive Disorder, Recurrent Episode, Severe _ and With anxious distress.    [facility]  TRACK: IOP 1    NUMBER OF PARTICIPANTS: 7        Data:    Session content: At the start of this group, patients were invited to check in by identifying themselves, describing their current emotional status, and identifying issues to address in this group.   Area(s) of treatment focus addressed in this session included Symptom Management and Personal Safety.  Pt reports feeling more hopeful after a good night's sleep. She identifies coping skills as nature and \"boundaries without guilt\". She feels she is making progress on radically accepting son's estrangement. She is planning new holiday traditions to deal with the loss. Pt sets goals to assist daughters with college exploration and to sit by the river this afternoon. Denies safety concerns.    Therapeutic Interventions/Treatment Strategies:  Psychotherapist offered support, feedback and validation and reinforced use of skills. Treatment modalities used include Cognitive Behavioral Therapy. Interventions include Cognitive Restructuring:  Explored impact of ineffective thoughts / distortions on mood and activity and Assisted patient in identifying new neutral/positive core beliefs and Coping Skills: Discussed use of self-soothe skills to decrease distress in the body, Assisted patient in identifying 1-2 healthy distraction skills to reduce overall distress, and Reviewed patients current calming practices and discussed a more formal way of practicing and accessing skills.    Assessment:    Patient response:   Patient responded to session by accepting " feedback, giving feedback, listening, focusing on goals, being attentive, and accepting support    Possible barriers to participation / learning include: and no barriers identified    Health Issues:   None reported       Substance Use Review:   Substance Use: No active concerns identified.    Mental Status/Behavioral Observations  Appearance:   Appropriate   Eye Contact:   Good   Psychomotor Behavior: Normal   Attitude:   Cooperative   Orientation:   All  Speech   Rate / Production: Normal    Volume:  Normal   Mood:    Anxious   Affect:    Appropriate   Thought Content:   Clear  Thought Form:  Coherent  Logical     Insight:    Good     Plan:   Safety Plan: No current safety concerns identified.  Recommended that patient call 911 or go to the local ED should there be a change in any of these risk factors.   Barriers to treatment: None identified  Patient Contracts (see media tab):  None  Substance Use: Not addressed in session   Continue or Discharge: Patient will continue in Adult Day Treatment (ADT)  as planned. Patient is likely to benefit from learning and using skills as they work toward the goals identified in their treatment plan.      Mela Whiteside, [facility]SW  October 8, 2024

## 2024-10-08 NOTE — GROUP NOTE
Psychotherapy Group Note    PATIENT'S NAME: Lora Cardenas  MRN:   [id number]  :   1975  ACCT. NUMBER: [id number]  DATE OF SERVICE: 10/08/24  START TIME: 10:00 AM  END TIME: 10:50 AM  FACILITATOR: Mela Olsen LICSW  TOPIC: MH EBP Group: Symptom Awareness  [facility]  TRACK: IOP 1    NUMBER OF PARTICIPANTS: 7    Summary of Group / Topics Discussed:  Symptom Awareness: Mood Disorders part 2: Patients received a general overview of mood disorders including depressive disorders, anxiety disorders, and bipolar disorders and how it relates to their current symptoms. The purpose is to promote understanding of their diagnoses and how it impacts their functioning. Patients reviewed their current awareness of symptoms and diagnoses. Patients received information regarding diagnoses, etiology, cultural, and environmental factors as well as impact on functioning.     Patient Session Goals / Objectives:  Discussed patient individual symptoms and experiences  Reviewed diagnostic criteria and etiology of diagnoses       Patient Participation / Response:  Fully participated with the group by sharing personal reflections / insights and openly received / provided feedback with other participants.    Demonstrated understanding of topics discussed through group discussion and participation, Demonstrated understanding of how information regarding symptoms can assist in management of symptoms, and Identified / Expressed personal readiness to increase awareness of symptoms and apply skills as necessary    Treatment Plan:  Patient has a current master individualized treatment plan.  See Epic treatment plan for more information.    BRYNN Christiansen

## 2024-10-08 NOTE — GROUP NOTE
Psychotherapy Group Note    PATIENT'S NAME: Lora Cardenas  MRN:   [id number]  :   1975  ACCT. NUMBER: [id number]  DATE OF SERVICE: 10/08/24  START TIME: 11:00 AM  END TIME: 11:50 AM  FACILITATOR: Lynn Ibrahim LICSW  TOPIC:  EBP Group: Symptom Awareness  [facility]  TRACK: IOP 1    NUMBER OF PARTICIPANTS: 7    Summary of Group / Topics Discussed:  Symptom Awareness: Panic: Patients received a general overview of panic and how it relates to their current symptoms. The purpose is to promote understanding of their diagnoses and how it impacts their functioning. Patients reviewed their current awareness of symptoms and diagnoses. Patients received information regarding diagnoses, etiology, cultural, and environmental factors as well as impact on functioning.     Patient Session Goals / Objectives:  Discussed patient individual symptoms and experiences  Reviewed diagnostic criteria and etiology of diagnoses   Discussed coping strategies for panic      Patient Participation / Response:  Fully participated with the group by sharing personal reflections / insights and openly received / provided feedback with other participants.    Demonstrated understanding of topics discussed through group discussion and participation, Demonstrated understanding of how information regarding symptoms can assist in management of symptoms, Identified / Expressed personal readiness to increase awareness of symptoms and apply skills as necessary, Verbalized understanding of how awareness can benefit mental health symptoms , and Identified plan to address barriers to increase awareness and knowledge about diagnoses and symptoms     Treatment Plan:  Patient has a current master individualized treatment plan.  See Epic treatment plan for more information.    BRYNN Balderrama

## 2024-10-09 ENCOUNTER — HOSPITAL ENCOUNTER (OUTPATIENT)
Dept: BEHAVIORAL HEALTH | Facility: CLINIC | Age: 49
Discharge: HOME | End: 2024-10-09
Attending: PSYCHIATRY & NEUROLOGY
Payer: COMMERCIAL

## 2024-10-09 DIAGNOSIS — F33.2 MAJOR DEPRESSIVE DISORDER, RECURRENT EPISODE, SEVERE WITH ANXIOUS DISTRESS (H): Primary | ICD-10-CM

## 2024-10-09 PROCEDURE — 90853 GROUP PSYCHOTHERAPY: CPT

## 2024-10-09 PROCEDURE — 99215 OFFICE O/P EST HI 40 MIN: CPT

## 2024-10-09 NOTE — PROGRESS NOTES
"[facility]   Adult Mental Health Outpatient Programs  Psychiatric Progress Record    Program Track: IOP 1    PATIENT'S NAME: Lora Cardenas  MRN:   [id number]  :   1975  ACCT. NUMBER: [id number]  DATE OF SERVICE: 10/09/24    Interval History:  \"better.\" Lora presents today for follow-up and ongoing program supervision.   Endorses:  I am doing better , a lot better  Learning coping skills that are healthy. Radical acceptance of things that I cannot change. Letting go of feeling responsible for other people and being responsiblble for myself  The schedule I am working is perfect for me right now. I am starting to have some consistency.  Breathing, go outside. Be able to apply basic skills to address issue has been very good  Medication compliant. The combination of Fluoxetine and Wellbutrin is helping. I think it is the right dose.  I am still having days that I am depression. But not debilitating. I can push through.   I am taking Trazodone at night only. I took Hydroxyzine and Ativan out of the equation. When used, I was very groggy. I have not have Ativan for over 2 weeks  Suicide thoughts; None int he past 2 weeks. No self harm thoughts.     Review of Symptoms  Sleep: still using sleep to escape anxiety and avoidance. Need to apply sleeping hygiene to create a routine   Appetite: still low, but it is getting better. One meal a day plus snacks  Suicidal ideation: denies current or recent suicidal ideation or behavior  Thoughts of non-suicidal self-injury: denied  Recent self-injurious behavior: denied  Homicidal ideation: denied  Other safety concerns: denied    Activities of Daily Living and Related Systems Impacted by Illness:  Hygiene: I am more consistent in taking care of myself. There is still room for improvement  Socialization: see above  Activities of Daily Living: (cleaning, shopping, bills, etc.): See above  Concerns related to work: none    Substance use:  Denies    Response to Program " Interventions:  I am so glad I am here    Medications:  Current Outpatient Medications   Medication Sig Dispense Refill    amLODIPine (NORVASC) 10 MG tablet Take 1 tablet by mouth daily      buPROPion (WELLBUTRIN XL) 150 MG 24 hr tablet Take 1 tablet (150 mg) by mouth every morning. 30 tablet 0    Cetirizine HCl (ZYRTEC ALLERGY PO) Take 10 mg by mouth daily.      estradiol (VIVELLE-DOT) 0.05 MG/24HR bi-weekly patch Place 1 patch onto the skin twice a week Sundays and Thursdays      FLUoxetine (PROZAC) 40 MG capsule Take 1 capsule (40 mg) by mouth daily. 30 capsule 0    fluticasone (FLONASE) 50 MCG/ACT nasal spray Spray 2 sprays into both nostrils daily as needed      hydrOXYzine (ATARAX) 25 MG tablet Take 25 mg by mouth      LORazepam (ATIVAN) 0.5 MG tablet Take 1 tablet (0.5 mg) by mouth daily as needed for anxiety. 10 tablet 0    multivitamin, therapeutic with minerals (THERA-VIT-M) TABS Take 1 tablet by mouth daily.      propranolol ER (INDERAL LA) 60 MG 24 hr capsule Take 60 mg by mouth daily      spironolactone (ALDACTONE) 50 MG tablet Take 50 mg by mouth      traZODone (DESYREL) 50 MG tablet Take 1 tablet (50 mg) by mouth at bedtime. 30 tablet 0    VITAMIN D, CHOLECALCIFEROL, PO Take 1,000 Units by mouth daily.         The above list was reviewed and updated in EPIC with patient today.     Patient is taking medications as prescribed and denies adverse effects    Laboratory Results:  Most recent labs reviewed. Pertinent updates/findings: None.     Mental Status Examination:  Vital Signs: There were no vitals taken for this visit.   Appearance: adequately groomed, appears stated age, and in no apparent distress.  Attitude: cooperative   Eye Contact: good   Muscle Strength and Tone: normal  Psychomotor Behavior: normal or unremarkable   Gait and Station: normal width, turn, arm swing  Speech: clear, coherent, decreased prosody, regular rate, regular rhythm, and fluent  Associations: No loosening of  "associations  Thought Process: coherent and goal directed  Thought Content: no evidence of suicidal ideation or homicidal ideation, no evidence of psychotic thought, no auditory hallucinations present, and no visual hallucinations present  Mood: \"anxious and depressed\"  Affect: mood congruent  Insight: good  Judgment: intact, adequate for safety  Impulse Control: intact  Oriented to: time, place, person, and situation  Attention Span and Concentration: Normal  Language: Intact  Recent and Remote Memory: Delayed & immediate recall intact  Fund of Knowledge/Assessment of Intelligence: Average  Capacity of Activities of Daily Living: Independent, able to participate in programmatic care services.    Diagnosis/es:    ICD-10-CM    1. Major depressive disorder, recurrent episode, severe with anxious distress (H)  F33.2         Assessment/Plan:  Lora presents today for follow-up psychiatric evaluation and assessment of progress. She reports overall improvement and continues to benefit from psychotherapy, milieu therapy, and medication management in [facility]. She will maintain this level of care, as discontinuation could reasonably increase the risk of requiring a higher level of care. Next follow up visit with the patient in about 3 weeks or sooner as needed.     Depression   Overall improved   Continue to engage in psychotherapy  Continue to work with outpatient provider for medication management  Continue with current medication regimen  Wellbutrin 150 mg   Fluoxetine 40 mg daily  Trazodone 50 mg at bedtime  Improve sleep hygiene  Maintain a balanced diet  Engage in physical activities as tolerated     Anxiety  Overall improved   As noted above  Lorazepam 0.5 mg once a day as needed   We discussed plan to discontinue this medication in the next appointment in 3 weeks.   Hydroxyzine 25 mg  twice daily       Suicide ideation  Overall improved   Continuous assessment and monitoring  Safety plan    Safety Assessment:  Lora " reports suicidal ideation and/or non-suicidal self-injury or thoughts thereof as noted above  Lora is future-oriented and is engaged in treatment planning   I do not feel that Lora meets criteria for a 72-hour involuntary hold and remains appropriate for an outpatient level of care    RAINA DUARTE DNP on 10/9/2024 at 10:19 AM    Visit Details:  Type of service: In-person  Location (patient and provider): [facility] Adult Mental Health Outpatient Programmatic Care Offices    Level of Medical Decision Making:   - At least 1 chronic problem that is not stable  - Engaged in prescription drug management during visit (discussed any medication benefits, side effects, alternatives, etc.)  Discussion of management or test interpretation with external physician/other qualified healthcare professional/appropriate source - programmatic care multidisciplinary treatment team    30 min spent on the date of the encounter in chart review, patient visit, review of tests, documentation, care coordination, and/or discussion with other providers about the issues documented above.      This document completed in part using [email or web address] dictation software and therefore may contain inadvertent word or phrase substitutions.

## 2024-10-09 NOTE — GROUP NOTE
Process Group Note    PATIENT'S NAME: Loar Cardenas  MRN:   [id number]  :   1975  ACCT. NUMBER: [id number]  DATE OF SERVICE: 10/09/24  START TIME:  9:00 AM  END TIME:  9:50 AM  FACILITATOR: Mela Olsen LICSW  TOPIC: MH Process Group    Diagnoses:     296.33 (F33.2) Major Depressive Disorder, Recurrent Episode, Severe _ and With anxious distress.    [facility] Mental Health Outpatient Programs  TRACK: IOP 1    NUMBER OF PARTICIPANTS: 6        Data:    Session content: At the start of this group, patients were invited to check in by identifying themselves, describing their current emotional status, and identifying issues to address in this group.   Area(s) of treatment focus addressed in this session included Symptom Management and Personal Safety.  Pt reports feeling tired after a busy day yesterday. She enjoyed hours in nature and was able to relax. She was pondering her future and is now considering a job change. Pt can see progress in her recovery and denies safety concerns.    Therapeutic Interventions/Treatment Strategies:  Psychotherapist offered support, feedback and validation and reinforced use of skills. Treatment modalities used include Cognitive Behavioral Therapy. Interventions include Behavioral Activation: Explored how behaviors effect mood and interact with thoughts and feelings, Cognitive Restructuring:  Explored impact of ineffective thoughts / distortions on mood and activity and Assisted patient in identifying new neutral/positive core beliefs, and Coping Skills: Facilitated discussion on learning and applying radical acceptance skill and Reviewed patients current calming practices and discussed a more formal way of practicing and accessing skills.    Assessment:    Patient response:   Patient responded to session by accepting feedback, giving feedback, listening, focusing on goals, being attentive, and accepting support    Possible barriers to participation / learning  include: and no barriers identified    Health Issues:   None reported       Substance Use Review:   Substance Use: No active concerns identified.    Mental Status/Behavioral Observations  Appearance:   Appropriate   Eye Contact:   Good   Psychomotor Behavior: Normal   Attitude:   Cooperative   Orientation:   All  Speech   Rate / Production: Normal    Volume:  Normal   Mood:    Normal  Affect:    Appropriate   Thought Content:   Clear  Thought Form:  Coherent  Logical     Insight:    Good     Plan:   Safety Plan: No current safety concerns identified.  Recommended that patient call 911 or go to the local ED should there be a change in any of these risk factors.   Barriers to treatment: None identified  Patient Contracts (see media tab):  None  Substance Use: Not addressed in session   Continue or Discharge: Patient will continue in Adult Day Treatment (ADT)  as planned. Patient is likely to benefit from learning and using skills as they work toward the goals identified in their treatment plan.      Mela Whiteside, [facility] CenterSW  October 9, 2024

## 2024-10-09 NOTE — GROUP NOTE
Psychotherapy Group Note    PATIENT'S NAME: Lora Cardenas  MRN:   [id number]  :   1975  ACCT. NUMBER: [id number]  DATE OF SERVICE: 10/09/24  START TIME: 11:00 AM  END TIME: 11:50 AM  FACILITATOR: Lynn Ibrahim LICSW  TOPIC: MH EBP Group: Coping Skills  [facility] Adult Mental Health Outpatient Programs  TRACK: IOP 1    NUMBER OF PARTICIPANTS: 5    Summary of Group / Topics Discussed:  Coping Skills: Additional Coping Skills:  Patients discussed and practiced TIPP (DBT distress tolerance).  Reviewed the benefits of applying the aforementioned coping strategies.  Patients explored how these strategies might be applied to daily stressors or distressing situations.    Patient Session Goals / Objectives:  Understand the purpose and benefits of applying TIPP coping strategies  Practice a progressive muscle relaxation  Address barriers to utilizing coping skills when in distress.      Patient Participation / Response:  Fully participated with the group by sharing personal reflections / insights and openly received / provided feedback with other participants.    Demonstrated understanding of topics discussed through group discussion and participation, Expressed understanding of the relevance / importance of coping skills at distressing times in life, Demonstrated knowledge of when to consider using a variety of coping skills in daily life, Identified barriers to applying coping skills, Identified 2-3 positive coping strategies that have helped maintain / improve symptoms in the past, and Practiced 2-3 new coping skills in session    Treatment Plan:  Patient has a current master individualized treatment plan.  See Epic treatment plan for more information.    BRYNN Balderrama

## 2024-10-10 ENCOUNTER — HOSPITAL ENCOUNTER (OUTPATIENT)
Dept: BEHAVIORAL HEALTH | Facility: CLINIC | Age: 49
Discharge: HOME | End: 2024-10-10
Attending: PSYCHIATRY & NEUROLOGY
Payer: COMMERCIAL

## 2024-10-10 PROCEDURE — 90853 GROUP PSYCHOTHERAPY: CPT

## 2024-10-10 NOTE — GROUP NOTE
Psychoeducation Group Note    PATIENT'S NAME: Lora Cardenas  MRN:   [id number]  :   1975  ACCT. NUMBER: [id number]  DATE OF SERVICE: 10/10/24  START TIME: 11:00 AM  END TIME: 11:50 AM  FACILITATOR: Lynn Ibrahim LICSW  TOPIC: MH Wellness Group: Health Maintenance  [facility] Adult Mental Health Outpatient Programs  TRACK: IOP 1    NUMBER OF PARTICIPANTS: 6    Summary of Group / Topics Discussed:  Health Maintenance: Weekend planning: Patients were given time to complete a weekend plan of what they will do to promote wellness and sobriety over the weekend when they do not have the structure of group. Patients were encouraged to review progress on their treatment goals and were challenged to identify ways to work toward meeting them. Patients identified and discussed foreseeable barriers to success over the weekend and then developed a plan to overcome them. Patients reviewed their distress coping skills and social support network and discussed this with the group.       Patient Session Goals / Objectives:    ?    Identified activities to engage in that promote balance in wellness  ?    Distinguished possible barriers to success over the weekend and created a plan to overcome them  ?    Listed distress coping skills and identified social support network to utilize if in crisis during the weekend        Patient Participation / Response:  Fully participated with the group by sharing personal reflections / insights and openly received / provided feedback with other participants.    Demonstrated understanding of topics discussed through group discussion and participation, Identified / Expressed personal readiness to practice skills, and Verbalized understanding of health maintenance topic    Treatment Plan:  Patient has a current master individualized treatment plan.  See Epic treatment plan for more information.    BRYNN Balderrama

## 2024-10-10 NOTE — GROUP NOTE
Psychoeducation Group Note    PATIENT'S NAME: Lora Cardenas  MRN:   [id number]  :   1975  ACCT. NUMBER: [id number]  DATE OF SERVICE: 10/10/24  START TIME: 10:00 AM  END TIME: 10:50 AM  FACILITATOR: Mela Olesn LICSW  TOPIC: MH Wellness Group: Health Maintenance  [facility] Adult Mental Health Outpatient Programs  TRACK: IOP 1    NUMBER OF PARTICIPANTS: 6    Summary of Group / Topics Discussed:  Health Maintenance: Discharge planning/Community resources part 2: Patients worked on completing an instructor-facilitated discharge planning activity. Discharge planning begins for all patients after admission. Competent discharge planning promotes a successful transition and decreases the likelihood of mental health relapse. In this group, all dimensions of wellness were reviewed to assess for needs/discharge readiness. These dimensions included: physical, emotional, occupational/productivity, environmental, social, spiritual, intellectual, and financial. Patients worked on completing/updating their discharge planning and identifying their treatment needs prior to time of discharge.     Patient Session Goals / Objectives:  Identified unmet treatment needs to accomplish before discharge  Completed all dimensions of the discharge planning packet  Participated in the planning process, make phone calls, set up appointments, got connected with community resources, followed up with treatment team as needed         Patient Participation / Response:  Fully participated with the group by sharing personal reflections / insights and openly received / provided feedback with other participants.    Demonstrated understanding of topics discussed through group discussion and participation, Identified / Expressed personal readiness to practice skills, and Verbalized understanding of health maintenance topic    Treatment Plan:  Patient has a current master individualized treatment plan.  See Epic treatment plan for more  information.    Mela Whiteside, LICSW

## 2024-10-10 NOTE — GROUP NOTE
"Process Group Note    PATIENT'S NAME: Lora Cardenas  MRN:   [id number]  :   1975  ACCT. NUMBER: [id number]  DATE OF SERVICE: 10/10/24  START TIME:  9:00 AM  END TIME:  9:50 AM  FACILITATOR: Mela Olsen LICSW  TOPIC:  Process Group    Diagnoses:     296.33 (F33.2) Major Depressive Disorder, Recurrent Episode, Severe _ and With anxious distress.    [facility] Mental Health Outpatient Programs  TRACK: IOP 1    NUMBER OF PARTICIPANTS: 6        Data:    Session content: At the start of this group, patients were invited to check in by identifying themselves, describing their current emotional status, and identifying issues to address in this group.   Area(s) of treatment focus addressed in this session included Symptom Management and Personal Safety.  Pt reports feeling positive after a good night's sleep. She felt triggered yesterday, when mother in law was critical of her. Pt is proud of leaving the conversation. She identifies coping skills as: assertiveness, nature and radical acceptance. Sets a goal to go to the farm today. Denies safety concerns.    Therapeutic Interventions/Treatment Strategies:  Psychotherapist offered support, feedback and validation and reinforced use of skills. Treatment modalities used include Cognitive Behavioral Therapy. Interventions include Coping Skills: Facilitated discussion on learning and applying radical acceptance skill, Discussed use of self-soothe skills to decrease distress in the body, Assisted patient in identifying 1-2 healthy distraction skills to reduce overall distress, Discussed how the use of intentional \"in the moment\" actions can help reduce distress, and Reviewed patients current calming practices and discussed a more formal way of practicing and accessing skills.    Assessment:    Patient response:   Patient responded to session by accepting feedback, giving feedback, listening, focusing on goals, being attentive, and accepting " support    Possible barriers to participation / learning include: and no barriers identified    Health Issues:   None reported       Substance Use Review:   Substance Use: No active concerns identified.    Mental Status/Behavioral Observations  Appearance:   Appropriate   Eye Contact:   Good   Psychomotor Behavior: Normal   Attitude:   Cooperative   Orientation:   All  Speech   Rate / Production: Normal    Volume:  Normal   Mood:    Anxious  Sad   Affect:    Appropriate   Thought Content:   Clear  Thought Form:  Coherent  Logical     Insight:    Good     Plan:   Safety Plan: No current safety concerns identified.  Recommended that patient call 911 or go to the local ED should there be a change in any of these risk factors.   Barriers to treatment: None identified  Patient Contracts (see media tab):  None  Substance Use: Not addressed in session   Continue or Discharge: Patient will continue in Adult Day Treatment (ADT)  as planned. Patient is likely to benefit from learning and using skills as they work toward the goals identified in their treatment plan.      Mela Whiteside, [facility]  October 10, 2024

## 2024-10-14 ENCOUNTER — HOSPITAL ENCOUNTER (OUTPATIENT)
Dept: BEHAVIORAL HEALTH | Facility: CLINIC | Age: 49
Discharge: HOME | End: 2024-10-14
Attending: PSYCHIATRY & NEUROLOGY
Payer: COMMERCIAL

## 2024-10-14 PROCEDURE — 90853 GROUP PSYCHOTHERAPY: CPT

## 2024-10-14 NOTE — GROUP NOTE
Process Group Note    PATIENT'S NAME: Lora Cardenas  MRN:   [id number]  :   1975  ACCT. NUMBER: [id number]  DATE OF SERVICE: 10/14/24  START TIME:  9:00 AM  END TIME:  9:50 AM  FACILITATOR: Mela Olsen LICSW  TOPIC:  Process Group    Diagnoses:     296.33 (F33.2) Major Depressive Disorder, Recurrent Episode, Severe _ and With anxious distress.    [facility] Mental Health Outpatient Programs  TRACK: IOP 1    NUMBER OF PARTICIPANTS: 6        Data:    Session content: At the start of this group, patients were invited to check in by identifying themselves, describing their current emotional status, and identifying issues to address in this group.   Area(s) of treatment focus addressed in this session included Symptom Management and Personal Safety.  Pt reports feeling grateful today. She is proud of the progress made here so far. Pt is anxious about estrangement from son, but feeling more relaxed. She identifies coping skills as: nature, grounding, staying in the moment. She sets a goal to keep learning coping skills and denies safety concerns.    Therapeutic Interventions/Treatment Strategies:  Psychotherapist offered support, feedback and validation and reinforced use of skills. Treatment modalities used include Cognitive Behavioral Therapy. Interventions include Coping Skills: Discussed use of self-soothe skills to decrease distress in the body and Reviewed patients current calming practices and discussed a more formal way of practicing and accessing skills.    Assessment:    Patient response:   Patient responded to session by accepting feedback, giving feedback, listening, focusing on goals, being attentive, and accepting support    Possible barriers to participation / learning include: and no barriers identified    Health Issues:   None reported       Substance Use Review:   Substance Use: No active concerns identified.    Mental Status/Behavioral  Observations  Appearance:   Appropriate   Eye Contact:   Good   Psychomotor Behavior: Normal   Attitude:   Cooperative   Orientation:   All  Speech   Rate / Production: Normal    Volume:  Normal   Mood:    Anxious  Normal  Affect:    Appropriate   Thought Content:   Clear  Thought Form:  Coherent  Logical     Insight:    Good     Plan:   Safety Plan: No current safety concerns identified.  Recommended that patient call 911 or go to the local ED should there be a change in any of these risk factors.   Barriers to treatment: None identified  Patient Contracts (see media tab):  None  Substance Use: Not addressed in session   Continue or Discharge: Patient will continue in Adult Day Treatment (ADT)  as planned. Patient is likely to benefit from learning and using skills as they work toward the goals identified in their treatment plan.      Mela Whiteside, [facility]  October 14, 2024

## 2024-10-14 NOTE — GROUP NOTE
Psychotherapy Group Note    PATIENT'S NAME: Lora Cardenas  MRN:   [id number]  :   1975  ACCT. NUMBER: [id number]  DATE OF SERVICE: 10/14/24  START TIME: 11:00 AM  END TIME: 11:50 AM  FACILITATOR: Lynn Ibrahim LICSW  TOPIC: MH EBP Group: Specialty Awareness  [facility] Adult Mental Health Outpatient Programs  TRACK: IOP 1    NUMBER OF PARTICIPANTS: 5    Summary of Group / Topics Discussed:  Specialty Topics: Life Transitions: The topic of life transitions was presented in order to help patients to better understand the challenges presented by life transitions, and how to best navigate them. Exploring the phases of transition and how one works through them was discussed. Patients were provided with information regarding community resources.     Patient Session Goals / Objectives:  Discussed the timing and nature of major life transitions  Explored how life transitions may impact mental health and functioning  Discussed coping strategies to manage symptoms and help with transitioning  Discussed and planned a successful transition      Patient Participation / Response:  Fully participated with the group by sharing personal reflections / insights and openly received / provided feedback with other participants.    Demonstrated understanding of topics discussed through group discussion and participation, Identified / Expressed readiness to act on skill suggestions discussed in topic, Verbalized understanding of ways to proactively manage illness, and Identified plan to address barriers to practicing skills discussed in topic    Treatment Plan:  Patient has a current master individualized treatment plan.  See Epic treatment plan for more information.    BRYNN Balderrama

## 2024-10-14 NOTE — GROUP NOTE
Psychoeducation Group Note    PATIENT'S NAME: Lora Cardenas  MRN:   [id number]  :   1975  ACCT. NUMBER: [id number]  DATE OF SERVICE: 10/14/24  START TIME: 10:00 AM  END TIME: 10:50 AM  FACILITATOR: Mela Olsen LICSW  TOPIC: MH Wellness Group: Health Maintenance  [facility] Outpatient [facility]  TRACK: IOP 1    NUMBER OF PARTICIPANTS: 6    Summary of Group / Topics Discussed:  Health Maintenance: Goal Setting: Meaningful goals can bring a sense of direction and purpose in life.  They also highlight our most important values. Patients were assisted by instructor to identify short term goals to promote their mental health recovery and improve overall health and wellness. Patients were educated on SMART goal setting framework as a strategy to increase outcomes and promote success.    Patient Session Goals / Objectives:  Explained the key concepts of SMART goal setting framework  Identified three goals successfully using SMART goal setting framework  Reviewed concept of balance in wellness as it pertains to goal setting        Patient Participation / Response:  Fully participated with the group by sharing personal reflections / insights and openly received / provided feedback with other participants.    Demonstrated understanding of topics discussed through group discussion and participation, Identified / Expressed personal readiness to practice skills, and Verbalized understanding of health maintenance topic    Treatment Plan:  Patient has a current master individualized treatment plan.  See Epic treatment plan for more information.    BRYNN Christiansen

## 2024-10-15 ENCOUNTER — HOSPITAL ENCOUNTER (OUTPATIENT)
Dept: BEHAVIORAL HEALTH | Facility: CLINIC | Age: 49
Discharge: HOME | End: 2024-10-15
Attending: PSYCHIATRY & NEUROLOGY
Payer: COMMERCIAL

## 2024-10-15 PROCEDURE — 90853 GROUP PSYCHOTHERAPY: CPT

## 2024-10-15 NOTE — GROUP NOTE
Psychoeducation Group Note    PATIENT'S NAME: Lora Cardenas  MRN:   [id number]  :   1975  ACCT. NUMBER: [id number]  DATE OF SERVICE: 10/15/24  START TIME: 11:00 AM  END TIME: 11:50 AM  FACILITATOR: Lynn Ibrahim LICSW  TOPIC: MH Wellness Group: Health Maintenance  [facility] Outpatient [facility]  TRACK: IOP 1    NUMBER OF PARTICIPANTS: 6    Summary of Group / Topics Discussed:  Health Maintenance: Eight Dimensions of Wellness: The concept of holistic health through the model of eight dimensions was introduced. Group members participated in identifying behaviors and activities in each of the dimensions of wellness.  The importance of each dimension was reinforced and the concept of balance in life as it relates to wellness was explored.      Patient Session Goals / Objectives:  Verbalized understanding of balance in wellness and how it relates to their life  Identified and explained the eight dimensions of wellness  Categorized activities and wellness needs into corresponding dimensions appropriately during exercise        Patient Participation / Response:  Fully participated with the group by sharing personal reflections / insights and openly received / provided feedback with other participants.    Demonstrated understanding of topics discussed through group discussion and participation, Identified / Expressed personal readiness to practice skills, and Verbalized understanding of health maintenance topic    Treatment Plan:  Patient has a current master individualized treatment plan.  See Epic treatment plan for more information.    BRYNN Balderrama

## 2024-10-15 NOTE — GROUP NOTE
"Process Group Note    PATIENT'S NAME: Lora Cardenas  MRN:   [id number]  :   1975  ACCT. NUMBER: [id number]  DATE OF SERVICE: 10/15/24  START TIME:  9:00 AM  END TIME:  9:50 AM  FACILITATOR: Mela Olsen LICSW  TOPIC:  Process Group    Diagnoses:     296.33 (F33.2) Major Depressive Disorder, Recurrent Episode, Severe _ and With anxious distress.    [facility] Mental [facility] Outpatient Programs  TRACK: IOP 1    NUMBER OF PARTICIPANTS: 5        Data:    Session content: At the start of this group, patients were invited to check in by identifying themselves, describing their current emotional status, and identifying issues to address in this group.   Area(s) of treatment focus addressed in this session included Symptom Management and Personal Safety.  Pt reports feeling positive and hopeful. She is grateful for a \"stretch of good days\". Pt identifies taking one task at a time and giving herself permission to say no, as coping skills. Pt is looking forward to time with friends. Denies safety concerns.    Therapeutic Interventions/Treatment Strategies:  Psychotherapist offered support, feedback and validation and reinforced use of skills. Treatment modalities used include Cognitive Behavioral Therapy. Interventions include Relationship Skills: Encouraged development and maintenance  of healthy boundaries.    Assessment:    Patient response:   Patient responded to session by accepting feedback, giving feedback, listening, focusing on goals, being attentive, and accepting support    Possible barriers to participation / learning include: and no barriers identified    Health Issues:   None reported       Substance Use Review:   Substance Use: No active concerns identified.    Mental Status/Behavioral Observations  Appearance:   Appropriate   Eye Contact:   Good   Psychomotor Behavior: Normal   Attitude:   Cooperative   Orientation:   All  Speech   Rate / Production: Normal    Volume:  Normal "   Mood:    Normal  Affect:    Appropriate   Thought Content:   Clear  Thought Form:  Coherent  Logical     Insight:    Good     Plan:   Safety Plan: No current safety concerns identified.  Recommended that patient call 911 or go to the local ED should there be a change in any of these risk factors.   Barriers to treatment: None identified  Patient Contracts (see media tab):  None  Substance Use: Not addressed in session   Continue or Discharge: Patient will continue in Adult Day Treatment (ADT)  as planned. Patient is likely to benefit from learning and using skills as they work toward the goals identified in their treatment plan.      Mela Whiteside, LICSW  October 15, 2024

## 2024-10-15 NOTE — GROUP NOTE
Psychotherapy Group Note    PATIENT'S NAME: Lora Cardenas  MRN:   [id number]  :   1975  ACCT. NUMBER: [id number]  DATE OF SERVICE: 10/15/24  START TIME: 10:00 AM  END TIME: 10:50 AM  FACILITATOR: Mela Olsen LICSW  TOPIC: MH EBP Group: Specialty Awareness  [facility] Adult Mental Health Outpatient Programs  TRACK: IOP 1    NUMBER OF PARTICIPANTS: 5    Summary of Group / Topics Discussed:  Specialty Topics: Forgiveness: Patients were provided with an overview of the topic of forgiveness including how to better understand the nature of forgiveness of others and oneself. Exploring the phases of forgiveness and how one works them was covered. Patients were able to explore how practicing forgiveness may positively impact their functioning.     Patient Session Goals / Objectives:  Discussed definitions of forgiveness and self-forgiveness  Explored the phases and process of forgiveness  Discussed benefits of forgiveness to their relationships with themselves and others, connecting the concept to mindfulness and acceptance  Assisted patients in recognizing when practicing forgiveness may be helpful      Patient Participation / Response:  Fully participated with the group by sharing personal reflections / insights and openly received / provided feedback with other participants.    Demonstrated understanding of topics discussed through group discussion and participation, Identified / Expressed readiness to act on skill suggestions discussed in topic, and Verbalized understanding of ways to proactively manage illness    Treatment Plan:  Patient has a current master individualized treatment plan.  See Epic treatment plan for more information.    BRYNN Christiansen

## 2024-10-16 ENCOUNTER — HOSPITAL ENCOUNTER (OUTPATIENT)
Dept: BEHAVIORAL HEALTH | Facility: CLINIC | Age: 49
Discharge: HOME | End: 2024-10-16
Attending: PSYCHIATRY & NEUROLOGY
Payer: COMMERCIAL

## 2024-10-16 PROCEDURE — 90853 GROUP PSYCHOTHERAPY: CPT

## 2024-10-16 NOTE — GROUP NOTE
Psychoeducation Group Note    PATIENT'S NAME: Lora Cardenas  MRN:   [id number]  :   1975  ACCT. NUMBER: [id number]  DATE OF SERVICE: 10/16/24  START TIME: 11:00 AM  END TIME: 11:50 AM  FACILITATOR: Lynn Ibrahim LICSW  TOPIC: MH Wellness Group: Mental Health Maintenance  [facility] Adult Mental Health Outpatient Programs  TRACK: IOP 1    NUMBER OF PARTICIPANTS: 4    Summary of Group / Topics Discussed:  Mental Health Maintenance:  Stigma: In this group patients explored stigma surrounding a mental health diagnosis.  The group discussed the way stigma impacts their own life, and discussed strategies to reduce. The relationship between physical and mental health were also explored in the context of healthcare access, treatment, and support.    Patient Session Goals / Objectives:  Patients identified the importance of practicing emotional hygiene  Patients identified ways to decrease the  impact of stigma in their own life      Patient Participation / Response:  Fully participated with the group by sharing personal reflections / insights and openly received / provided feedback with other participants.    Demonstrated understanding of topics discussed through group discussion and participation, Identified / Expressed personal readiness to practice skills, and Verbalized understanding of mental health maintenance topic    Treatment Plan:  Patient has a current master individualized treatment plan.  See Epic treatment plan for more information.    BRYNN Balderrmaa

## 2024-10-16 NOTE — GROUP NOTE
Psychotherapy Group Note    PATIENT'S NAME: Lora Cardenas  MRN:   [id number]  :   1975  ACCT. NUMBER: [id number]  DATE OF SERVICE: 10/16/24  START TIME: 10:00 AM  END TIME: 10:50 AM  FACILITATOR: Mela Olsen LICSW  TOPIC: MH EBP Group: Specialty Awareness  [facility] Adult Mental [facility] Outpatient Programs  TRACK: IOP 1    NUMBER OF PARTICIPANTS: 4    Summary of Group / Topics Discussed:  Specialty Topics: Forgiveness part 2: Patients were provided with an overview of the topic of forgiveness including how to better understand the nature of forgiveness of others and oneself. Exploring the phases of forgiveness and how one works them was covered. Patients were able to explore how practicing forgiveness may positively impact their functioning.     Patient Session Goals / Objectives:  Discussed definitions of forgiveness and self-forgiveness  Explored the phases and process of forgiveness  Discussed benefits of forgiveness to their relationships with themselves and others, connecting the concept to mindfulness and acceptance  Assisted patients in recognizing when practicing forgiveness may be helpful      Patient Participation / Response:  Fully participated with the group by sharing personal reflections / insights and openly received / provided feedback with other participants.    Demonstrated understanding of topics discussed through group discussion and participation, Identified / Expressed readiness to act on skill suggestions discussed in topic, and Verbalized understanding of ways to proactively manage illness    Treatment Plan:  Patient has a current master individualized treatment plan.  See Epic treatment plan for more information.    BRYNN Christiansen

## 2024-10-16 NOTE — GROUP NOTE
"Process Group Note    PATIENT'S NAME: Lora Cardenas  MRN:   [id number]  :   1975  ACCT. NUMBER: [id number]  DATE OF SERVICE: 10/16/24  START TIME:  9:00 AM  END TIME:  9:50 AM  FACILITATOR: Mela Olsen LICSW  TOPIC:  Process Group    Diagnoses:     296.33 (F33.2) Major Depressive Disorder, Recurrent Episode, Severe _ and With anxious distress.    [facility] Mental [facility] Outpatient Programs  TRACK: IOP 1    NUMBER OF PARTICIPANTS: 4        Data:    Session content: At the start of this group, patients were invited to check in by identifying themselves, describing their current emotional status, and identifying issues to address in this group.   Area(s) of treatment focus addressed in this session included Symptom Management and Personal Safety.  Pt reports feeling \"heartbroken, sad, overwhelmed, vulnerable, yet hopeful\". She was up all night, worrying about her future. She fears not reconciling with son. Pt identifies coping skills as: puzzles, journaling and napping. She sets a goal to make bread and denies safety concerns.    Therapeutic Interventions/Treatment Strategies:  Psychotherapist offered support, feedback and validation and reinforced use of skills. Treatment modalities used include Cognitive Behavioral Therapy. Interventions include Cognitive Restructuring:  Assisted patient in identifying new neutral/positive core beliefs, Coping Skills: Discussed use of self-soothe skills to decrease distress in the body, Discussed how the use of intentional \"in the moment\" actions can help reduce distress, Reviewed patients current calming practices and discussed a more formal way of practicing and accessing skills, Promoted understanding of how and when to apply grounding strategies to reduce distress and increase presence in the moment, and Assisted patient in understanding the purpose of planning / creating / participating / sharing in positive experiences, and Symptoms Management: " Promoted understanding of their diagnoses and how it impacts their functioning.    Assessment:    Patient response:   Patient responded to session by accepting feedback, giving feedback, listening, focusing on goals, being attentive, and accepting support    Possible barriers to participation / learning include: and no barriers identified    Health Issues:   None reported       Substance Use Review:   Substance Use: No active concerns identified.    Mental Status/Behavioral Observations  Appearance:   Appropriate   Eye Contact:   Good   Psychomotor Behavior: Normal   Attitude:   Cooperative   Orientation:   All  Speech   Rate / Production: Normal    Volume:  Normal   Mood:    Anxious  Grieving Fearful  Affect:    Tearful Worrisome   Thought Content:   Rumination  Thought Form:  Coherent  Obsessive     Insight:    Good     Plan:   Safety Plan: No current safety concerns identified.  Recommended that patient call 911 or go to the local ED should there be a change in any of these risk factors.   Barriers to treatment: None identified  Patient Contracts (see media tab):  None  Substance Use: Not addressed in session   Continue or Discharge: Patient will continue in Adult Day Treatment (ADT)  as planned. Patient is likely to benefit from learning and using skills as they work toward the goals identified in their treatment plan.      Mela Whiteside, [facility]  October 16, 2024

## 2024-10-17 ENCOUNTER — HOSPITAL ENCOUNTER (OUTPATIENT)
Dept: BEHAVIORAL HEALTH | Facility: CLINIC | Age: 49
Discharge: HOME | End: 2024-10-17
Attending: PSYCHIATRY & NEUROLOGY
Payer: COMMERCIAL

## 2024-10-17 PROCEDURE — 90853 GROUP PSYCHOTHERAPY: CPT

## 2024-10-17 PROCEDURE — 90853 GROUP PSYCHOTHERAPY: CPT | Performed by: SOCIAL WORKER

## 2024-10-17 NOTE — GROUP NOTE
"Process Group Note    PATIENT'S NAME: Lora Cardenas  MRN:   [id number]  :   1975  ACCT. NUMBER: [id number]  DATE OF SERVICE: 10/17/24  START TIME:  9:00 AM  END TIME:  9:50 AM  FACILITATOR: Mela Olsen LICSW  TOPIC:  Process Group    Diagnoses:     296.33 (F33.2) Major Depressive Disorder, Recurrent Episode, Severe _ and With anxious distress.    [facility] Mental [facility] Outpatient Programs  TRACK: IOP 1    NUMBER OF PARTICIPANTS: 5        Data:    Session content: At the start of this group, patients were invited to check in by identifying themselves, describing their current emotional status, and identifying issues to address in this group.   Area(s) of treatment focus addressed in this session included Symptom Management and Personal Safety.  Pt reports feeling \"tired, vulnerable and grieving\". She is worried about \"navigating my birthday next week, without my son\". Pt is exploring options for that day and may choose something untraditional. She identifies coping skills as: puzzles, coloring, radical acceptance and journaling. Pt is grateful to be here and denies safety concerns.    Therapeutic Interventions/Treatment Strategies:  Psychotherapist offered support, feedback and validation and reinforced use of skills. Treatment modalities used include Cognitive Behavioral Therapy. Interventions include Cognitive Restructuring:  Explored impact of ineffective thoughts / distortions on mood and activity and Assisted patient in identifying new neutral/positive core beliefs, Coping Skills: Discussed use of self-soothe skills to decrease distress in the body, Assisted patient in identifying 1-2 healthy distraction skills to reduce overall distress, Discussed how the use of intentional \"in the moment\" actions can help reduce distress, Reviewed patients current calming practices and discussed a more formal way of practicing and accessing skills, and Promoted understanding of how and when to " apply grounding strategies to reduce distress and increase presence in the moment, and Symptoms Management: Promoted understanding of their diagnoses and how it impacts their functioning.    Assessment:    Patient response:   Patient responded to session by accepting feedback, giving feedback, listening, focusing on goals, being attentive, and accepting support    Possible barriers to participation / learning include: and no barriers identified    Health Issues:   None reported       Substance Use Review:   Substance Use: No active concerns identified.    Mental Status/Behavioral Observations  Appearance:   Appropriate   Eye Contact:   Good   Psychomotor Behavior: Restless   Attitude:   Cooperative   Orientation:   All  Speech   Rate / Production: Normal    Volume:  Normal   Mood:    Anxious  Depressed  Grieving Fearful  Affect:    Tearful Worrisome   Thought Content:   Rumination  Thought Form:  Coherent  Obsessive     Insight:    Good     Plan:   Safety Plan: No current safety concerns identified.  Recommended that patient call 911 or go to the local ED should there be a change in any of these risk factors.   Barriers to treatment: None identified  Patient Contracts (see media tab):  None  Substance Use: Not addressed in session   Continue or Discharge: Patient will continue in Adult Day Treatment (ADT)  as planned. Patient is likely to benefit from learning and using skills as they work toward the goals identified in their treatment plan.      Mela Whiteside, [facility]  October 17, 2024

## 2024-10-17 NOTE — GROUP NOTE
Psychoeducation Group Note    PATIENT'S NAME: Lora Cardenas  MRN:   [id number]  :   1975  ACCT. NUMBER: [id number]  DATE OF SERVICE: 10/17/24  START TIME: 11:00 AM  END TIME: 11:50 AM  FACILITATOR: Jessenia Watkins LICSW  TOPIC: MH Wellness Group: [facility] Adult Mental Health Outpatient Programs  TRACK: IOP1    NUMBER OF PARTICIPANTS: 5    Summary of Group / Topics Discussed:  Health Maintenance: Weekend planning: Patients were given time to complete a weekend plan of what they will do to promote wellness and sobriety over the weekend when they do not have the structure of group. Patients were encouraged to review progress on their treatment goals and were challenged to identify ways to work toward meeting them. Patients identified and discussed foreseeable barriers to success over the weekend and then developed a plan to overcome them. Patients reviewed their distress coping skills and social support network and discussed this with the group.       Patient Session Goals / Objectives:    ?    Identified activities to engage in that promote balance in wellness  ?    Distinguished possible barriers to success over the weekend and created a plan to overcome them  ?    Listed distress coping skills and identified social support network to utilize if in crisis during the weekend        Patient Participation / Response:  Fully participated with the group by sharing personal reflections / insights and openly received / provided feedback with other participants.    Identified / Expressed personal readiness to practice skills    Treatment Plan:  Patient has a current master individualized treatment plan.  See Epic treatment plan for more information.    BRYNN Junior

## 2024-10-17 NOTE — GROUP NOTE
Psychotherapy Group Note    PATIENT'S NAME: Lora Cardenas  MRN:   [id number]  :   1975  ACCT. NUMBER: [id number]  DATE OF SERVICE: 10/17/24  START TIME: 10:00 AM  END TIME: 10:50 AM  FACILITATOR: Mela Olsen LICSW  TOPIC: MH EBP Group: Coping Skills  [facility] Adult Mental Health Outpatient Programs  TRACK: IOP 1 and DT 1    NUMBER OF PARTICIPANTS: 6    Summary of Group / Topics Discussed:  Coping Skills: Self-Soothe: Patients learned to apply self-soothe as a way to decrease heightened stress in the moment.  Patients identified situations that necessitate self-soothe strategies.  They focused on ways to manage physical symptoms of distress using the senses. They discussed how to distinguish when this can be useful in their lives when other strategies are more relevant or helpful.    Patient Session Goals / Objectives:  Understand the purpose of using the senses to decrease distress  Process what happens in the body when using self-soothe strategies  Demonstrate understanding of when to use self-soothe strategies  Identify and problem solve barriers to applying self-soothe strategies.  Choose 1-2 self-soothe strategies to apply during times of distress.      Patient Participation / Response:  Fully participated with the group by sharing personal reflections / insights and openly received / provided feedback with other participants.    Demonstrated understanding of topics discussed through group discussion and participation, Expressed understanding of the relevance / importance of coping skills at distressing times in life, and Demonstrated knowledge of when to consider using a variety of coping skills in daily life    Treatment Plan:  Patient has a current master individualized treatment plan.  See Epic treatment plan for more information.    BRYNN Christiansen

## 2024-10-21 ENCOUNTER — HOSPITAL ENCOUNTER (OUTPATIENT)
Dept: BEHAVIORAL HEALTH | Facility: CLINIC | Age: 49
Discharge: HOME | End: 2024-10-21
Attending: PSYCHIATRY & NEUROLOGY
Payer: COMMERCIAL

## 2024-10-21 PROCEDURE — 90853 GROUP PSYCHOTHERAPY: CPT

## 2024-10-21 NOTE — GROUP NOTE
Psychotherapy Group Note    PATIENT'S NAME: Lora Cardenas  MRN:   [id number]  :   1975  ACCT. NUMBER: [id number]  DATE OF SERVICE: 10/21/24  START TIME: 11:00 AM  END TIME: 11:50 AM  FACILITATOR: Lynn Ibrahim LICSW  TOPIC: MH EBP Group: Relationship Skills  [facility] Outpatient [facility]  TRACK: IOP 1    NUMBER OF PARTICIPANTS: 8    Summary of Group / Topics Discussed:  Relationship Skills: Basic Communication / Active Listening: Patients were provided with a general overview of interpersonal communication skills and information about how communication skills impact symptoms and functioning. The goal of this topic is to help patients identify communication issues and gain skills to work towards healthier interpersonal communication. Patients reviewed their current awareness of communication issues and how communication skills impact relationships and functioning.      Patient Session Goals / Objectives:  Identified and discussed patients individual challenges with basic communication / active listening  Presented and practiced effective communication skills in session  Assisted patients in implementing more effective communication skills in their relationships      Patient Participation / Response:  Fully participated with the group by sharing personal reflections / insights and openly received / provided feedback with other participants.    Demonstrated understanding of topics discussed through group discussion and participation, Demonstrated understanding of relationship skills and communication skills, Identified / Expressed personal readiness to incorporate effective communication skills, Verbalized understanding of communication skills, communication challenges, and communication strengths, and Identified plan to address barriers to practicing relationship skills     Treatment Plan:  Patient has a current master individualized treatment plan.  See Epic treatment plan for  more information.    NURYS BalderramaSW

## 2024-10-21 NOTE — GROUP NOTE
Process Group Note    PATIENT'S NAME: Lora Cardenas  MRN:   [id number]  :   1975  ACCT. NUMBER: [id number]  DATE OF SERVICE: 10/21/24  START TIME:  9:00 AM  END TIME:  9:50 AM  FACILITATOR: Mela Olsen LICSW  TOPIC:  Process Group    Diagnoses:     296.33 (F33.2) Major Depressive Disorder, Recurrent Episode, Severe _ and With anxious distress.    [facility]  TRACK: IOP 1    NUMBER OF PARTICIPANTS: 9        Data:    Session content: At the start of this group, patients were invited to check in by identifying themselves, describing their current emotional status, and identifying issues to address in this group.   Area(s) of treatment focus addressed in this session included Symptom Management and Personal Safety.  Pt reports feeling energized and hopeful. She enjoyed the weekend and was both productive and social. Pt identifies coping skills as humor, distraction and grounding. Denies safety concerns.    Therapeutic Interventions/Treatment Strategies:  Psychotherapist offered support, feedback and validation and reinforced use of skills. Treatment modalities used include Cognitive Behavioral Therapy. Interventions include Behavioral Activation: Explored how behaviors effect mood and interact with thoughts and feelings.    Assessment:    Patient response:   Patient responded to session by accepting feedback, giving feedback, listening, focusing on goals, being attentive, and accepting support    Possible barriers to participation / learning include: and no barriers identified    Health Issues:   None reported       Substance Use Review:   Substance Use: No active concerns identified.    Mental Status/Behavioral Observations  Appearance:   Appropriate   Eye Contact:   Good   Psychomotor Behavior: Normal   Attitude:   Cooperative   Orientation:   All  Speech   Rate / Production: Normal    Volume:  Normal   Mood:    Normal  Affect:    Appropriate   Thought  Content:   Clear  Thought Form:  Coherent  Logical     Insight:    Good     Plan:   Safety Plan: No current safety concerns identified.  Recommended that patient call 911 or go to the local ED should there be a change in any of these risk factors.   Barriers to treatment: None identified  Patient Contracts (see media tab):  None  Substance Use: Not addressed in session   Continue or Discharge: Patient will continue in Adult Day Treatment (ADT)  as planned. Patient is likely to benefit from learning and using skills as they work toward the goals identified in their treatment plan.      Mela Whiteside, [facility]  October 21, 2024

## 2024-10-21 NOTE — GROUP NOTE
Psychotherapy Group Note    PATIENT'S NAME: Lora Cardenas  MRN:   [id number]  :   1975  ACCT. NUMBER: [id number]  DATE OF SERVICE: 10/21/24  START TIME: 10:00 AM  END TIME: 10:50 AM  FACILITATOR: Mela Olsen LICSW  TOPIC: MH EBP Group: Relationship Skills  [facility]  TRACK: IOP 1    NUMBER OF PARTICIPANTS: 7    Summary of Group / Topics Discussed:  Relationship Skills: Assertive Communication: Patients were provided with a general overview of assertive communication skills and how practicing assertive communication skills will assist patients in developing healthier and more effective relationships. Patients reviewed their current awareness on ability to practice assertive communication, ways to increase assertive communication, and identified/problem solved barriers to assertive communication.     Patient Session Goals / Objectives:  Identified and discussed patient individual challenges with communication  Presented and practiced effective communication skills in session  Assisted patients in implementing more effective communication skills in their relationships      Patient Participation / Response:  Fully participated with the group by sharing personal reflections / insights and openly received / provided feedback with other participants.    Demonstrated understanding of topics discussed through group discussion and participation and Demonstrated understanding of relationship skills and communication skills    Treatment Plan:  Patient has a current master individualized treatment plan.  See Epic treatment plan for more information.    BRYNN Christiansen

## 2024-10-23 ENCOUNTER — HOSPITAL ENCOUNTER (OUTPATIENT)
Dept: BEHAVIORAL HEALTH | Facility: CLINIC | Age: 49
Discharge: HOME | End: 2024-10-23
Attending: PSYCHIATRY & NEUROLOGY
Payer: COMMERCIAL

## 2024-10-23 PROCEDURE — 90853 GROUP PSYCHOTHERAPY: CPT

## 2024-10-23 NOTE — GROUP NOTE
Psychotherapy Group Note    PATIENT'S NAME: Lora Cardenas  MRN:   [id number]  :   1975  ACCT. NUMBER: [id number]  DATE OF SERVICE: 10/23/24  START TIME: 10:00 AM  END TIME: 10:50 AM  FACILITATOR: Mela Olsen LICSW  TOPIC: MH EBP Group: Relationship Skills  [facility]  TRACK: IOP 1    NUMBER OF PARTICIPANTS: 7    Summary of Group / Topics Discussed:  Relationship Skills: Boundaries: Patients were provided with a general overview of interpersonal boundaries and how lack of boundaries relates to symptoms and functioning. The purpose is to help patients identify boundary issues and gain awareness and skills to work towards healthier interpersonal boundaries. Current awareness of healthy boundary characteristics and barriers to establishing healthy boundaries were discussed.    Patient Session Goals / Objectives:  Familiarized patients with the concept of interpersonal boundaries and their characteristics  Discussed and practiced strategies to promote healthier interpersonal boundaries  Identified boundary issues and identified plan to improve boundaries      Patient Participation / Response:  Fully participated with the group by sharing personal reflections / insights and openly received / provided feedback with other participants.    Demonstrated understanding of topics discussed through group discussion and participation, Demonstrated understanding of relationship skills and communication skills, and Identified / Expressed personal readiness to incorporate effective communication skills    Treatment Plan:  Patient has a current master individualized treatment plan.  See Epic treatment plan for more information.    BRYNN Christiansen

## 2024-10-23 NOTE — GROUP NOTE
Psychotherapy Group Note    PATIENT'S NAME: Lora Cardenas  MRN:   [id number]  :   1975  ACCT. NUMBER: [id number]  DATE OF SERVICE: 10/23/24  START TIME: 11:00 AM  END TIME: 11:50 AM  FACILITATOR: Lynn Ibrahim LICSW  TOPIC: MH EBP Group: Relationship Skills  [facility]  TRACK: IOP 1    NUMBER OF PARTICIPANTS: 6    Summary of Group / Topics Discussed:  Relationship Skills: NENO: Patients were provided with a general overview of objective effectiveness communication skills and how practicing NENO skills will assist patients in developing healthier and more effective relationships. Patients reviewed their current awareness on ability to practice assertive communication, ways to increase assertive communication, and identified/problem solved barriers to assertive communication.     Patient Session Goals / Objectives:  Identified and discussed patient individual challenges with communication  Presented and practiced effective communications (using NENO) in session  Assisted patients in implementing more effective communication skills in their relationships      Patient Participation / Response:  Fully participated with the group by sharing personal reflections / insights and openly received / provided feedback with other participants.    Demonstrated understanding of topics discussed through group discussion and participation, Demonstrated understanding of relationship skills and communication skills, Identified / Expressed personal readiness to incorporate effective communication skills, Verbalized understanding of communication skills, communication challenges, and communication strengths, and Identified plan to address barriers to practicing relationship skills     Treatment Plan:  Patient has a current master individualized treatment plan.  See Epic treatment plan for more information.    BRYNN Balderrama

## 2024-10-23 NOTE — GROUP NOTE
"Process Group Note    PATIENT'S NAME: Lora Cardenas  MRN:   [id number]  :   1975  ACCT. NUMBER: [id number]  DATE OF SERVICE: 10/23/24  START TIME:  9:00 AM  END TIME:  9:50 AM  FACILITATOR: Mela Olsen LICSW  TOPIC:  Process Group    Diagnoses:     296.33 (F33.2) Major Depressive Disorder, Recurrent Episode, Severe _ and With anxious distress.    [facility]  TRACK: IOP 1    NUMBER OF PARTICIPANTS: 7        Data:    Session content: At the start of this group, patients were invited to check in by identifying themselves, describing their current emotional status, and identifying issues to address in this group.   Area(s) of treatment focus addressed in this session included Symptom Management and Personal Safety.  Pt reports feeling \"heartbroken, but hopeful\". She is sad that today is her birthday and she remains estranged from adult son. Her  has organized a family dinner, but she is anxious that both her mother-in-law and her other adult son will be there. Pt describes mother-in-law as toxic. She has not seen this adult son, since her hospitalization. She fears that they may trigger her. Pt states \"I am tired of being good and people lie\". She identifies coping skill as deep breathing. She is grateful for the group. Denies safety concerns.    Therapeutic Interventions/Treatment Strategies:  Psychotherapist offered support, feedback and validation and reinforced use of skills. Treatment modalities used include Cognitive Behavioral Therapy. Interventions include Coping Skills: Reviewed patients current calming practices and discussed a more formal way of practicing and accessing skills and Promoted understanding of how and when to apply grounding strategies to reduce distress and increase presence in the moment and Relationship Skills: Encouraged development and maintenance  of healthy boundaries.    Assessment:    Patient response:   Patient " responded to session by accepting feedback, giving feedback, listening, focusing on goals, being attentive, and accepting support    Possible barriers to participation / learning include: and no barriers identified    Health Issues:   None reported       Substance Use Review:   Substance Use: No active concerns identified.    Mental Status/Behavioral Observations  Appearance:   Appropriate   Eye Contact:   Good   Psychomotor Behavior: Normal   Attitude:   Cooperative   Orientation:   All  Speech   Rate / Production: Normal    Volume:  Normal   Mood:    Anxious  Grieving Fearful  Affect:    Tearful Worrisome   Thought Content:   Rumination  Thought Form:  Coherent  Obsessive     Insight:    Fair     Plan:   Safety Plan: No current safety concerns identified.  Recommended that patient call 911 or go to the local ED should there be a change in any of these risk factors.   Barriers to treatment: None identified  Patient Contracts (see media tab):  None  Substance Use: Not addressed in session   Continue or Discharge: Patient will continue in Adult Day Treatment (ADT)  as planned. Patient is likely to benefit from learning and using skills as they work toward the goals identified in their treatment plan.      Mela Whiteside, [facility]  October 23, 2024

## 2024-10-24 ENCOUNTER — HOSPITAL ENCOUNTER (OUTPATIENT)
Dept: BEHAVIORAL HEALTH | Facility: CLINIC | Age: 49
Discharge: HOME | End: 2024-10-24
Attending: PSYCHIATRY & NEUROLOGY
Payer: COMMERCIAL

## 2024-10-24 PROCEDURE — 90853 GROUP PSYCHOTHERAPY: CPT

## 2024-10-24 NOTE — GROUP NOTE
Psychoeducation Group Note    PATIENT'S NAME: Lora Cardenas  MRN:   [id number]  :   1975  ACCT. NUMBER: [id number]  DATE OF SERVICE: 10/24/24  START TIME: 11:00 AM  END TIME: 11:50 AM  FACILITATOR: Lynn Ibrahim LICSW  TOPIC: MH Wellness Group: Health Maintenance  [facility]  TRACK: IOP 1    NUMBER OF PARTICIPANTS: 6    Summary of Group / Topics Discussed:  Health Maintenance: Weekend planning: Patients were given time to complete a weekend plan of what they will do to promote wellness and sobriety over the weekend when they do not have the structure of group. Patients were encouraged to review progress on their treatment goals and were challenged to identify ways to work toward meeting them. Patients identified and discussed foreseeable barriers to success over the weekend and then developed a plan to overcome them. Patients reviewed their distress coping skills and social support network and discussed this with the group.       Patient Session Goals / Objectives:    ?    Identified activities to engage in that promote balance in wellness  ?    Distinguished possible barriers to success over the weekend and created a plan to overcome them  ?    Listed distress coping skills and identified social support network to utilize if in crisis during the weekend        Patient Participation / Response:  Fully participated with the group by sharing personal reflections / insights and openly received / provided feedback with other participants.    Demonstrated understanding of topics discussed through group discussion and participation, Identified / Expressed personal readiness to practice skills, and Verbalized understanding of health maintenance topic    Treatment Plan:  Patient has a current master individualized treatment plan.  See Epic treatment plan for more information.    BRYNN Balderrama

## 2024-10-24 NOTE — GROUP NOTE
Psychotherapy Group Note    PATIENT'S NAME: Lora Cardenas  MRN:   [id number]  :   1975  ACCT. NUMBER: [id number]  DATE OF SERVICE: 10/24/24  START TIME: 10:00 AM  END TIME: 10:50 AM  FACILITATOR: Mela Olsen LICSW  TOPIC: MH EBP Group: Relationship Skills  [facility]  TRACK: IOP 1    NUMBER OF PARTICIPANTS: 7    Summary of Group / Topics Discussed:  Relationship Skills: Boundaries part 2: Patients were provided with a general overview of interpersonal boundaries and how lack of boundaries relates to symptoms and functioning. The purpose is to help patients identify boundary issues and gain awareness and skills to work towards healthier interpersonal boundaries. Current awareness of healthy boundary characteristics and barriers to establishing healthy boundaries were discussed.    Patient Session Goals / Objectives:  Familiarized patients with the concept of interpersonal boundaries and their characteristics  Discussed and practiced strategies to promote healthier interpersonal boundaries  Identified boundary issues and identified plan to improve boundaries      Patient Participation / Response:  Fully participated with the group by sharing personal reflections / insights and openly received / provided feedback with other participants.    Demonstrated understanding of topics discussed through group discussion and participation and Demonstrated understanding of relationship skills and communication skills    Treatment Plan:  Patient has a current master individualized treatment plan.  See Epic treatment plan for more information.    BRYNN Christiansen

## 2024-10-24 NOTE — GROUP NOTE
"Process Group Note    PATIENT'S NAME: Lora Cardenas  MRN:   [id number]  :   1975  ACCT. NUMBER: [id number]  DATE OF SERVICE: 10/24/24  START TIME:  9:00 AM  END TIME:  9:50 AM  FACILITATOR: Mela Olsen LICSW  TOPIC:  Process Group    Diagnoses:     296.33 (F33.2) Major Depressive Disorder, Recurrent Episode, Severe _ and With anxious distress.    [facility]  TRACK: IOP 1    NUMBER OF PARTICIPANTS: 7        Data:    Session content: At the start of this group, patients were invited to check in by identifying themselves, describing their current emotional status, and identifying issues to address in this group.   Area(s) of treatment focus addressed in this session included Symptom Management and Personal Safety.  Pt reports feeling \"encouraged, self-assured and hopeful\" following birthday dinner yesterday. She was able to use assertiveness and boundaries with her family. Pt is grateful for her 's support when interacting with her mother-in-law. Pt identifies \"being anxious about the anxiety I was maybe going to have\". She feels positive about having a disco dance with the family. Pt will return to work part time today and plans to take small steps. She is grateful for her life and the group. Denies safety concerns.    Therapeutic Interventions/Treatment Strategies:  Psychotherapist offered support, feedback and validation and reinforced use of skills. Treatment modalities used include Cognitive Behavioral Therapy. Interventions include Behavioral Activation: Explored how behaviors effect mood and interact with thoughts and feelings, Coping Skills: Facilitated discussion on learning and applying radical acceptance skill, Reviewed patients current calming practices and discussed a more formal way of practicing and accessing skills, Promoted understanding of how and when to apply grounding strategies to reduce distress and increase presence in the " moment, and Assisted patient in understanding the purpose of planning / creating / participating / sharing in positive experiences, and Relationship Skills: Encouraged development and maintenance  of healthy boundaries.    Assessment:    Patient response:   Patient responded to session by accepting feedback, giving feedback, listening, focusing on goals, being attentive, and accepting support    Possible barriers to participation / learning include: and no barriers identified    Health Issues:   None reported       Substance Use Review:   Substance Use: No active concerns identified.    Mental Status/Behavioral Observations  Appearance:   Appropriate   Eye Contact:   Good   Psychomotor Behavior: Normal   Attitude:   Cooperative   Orientation:   All  Speech   Rate / Production: Normal    Volume:  Normal   Mood:    Normal  Affect:    Appropriate   Thought Content:   Clear  Thought Form:  Coherent  Logical     Insight:    Good     Plan:   Safety Plan: No current safety concerns identified.  Recommended that patient call 911 or go to the local ED should there be a change in any of these risk factors.   Barriers to treatment: None identified  Patient Contracts (see media tab):  None  Substance Use: Not addressed in session   Continue or Discharge: Patient will continue in Adult Day Treatment (ADT)  as planned. Patient is likely to benefit from learning and using skills as they work toward the goals identified in their treatment plan.      Mela Whiteside, LICSW  October 24, 2024

## 2024-10-28 ENCOUNTER — HOSPITAL ENCOUNTER (OUTPATIENT)
Dept: BEHAVIORAL HEALTH | Facility: CLINIC | Age: 49
Discharge: HOME | End: 2024-10-28
Attending: PSYCHIATRY & NEUROLOGY
Payer: COMMERCIAL

## 2024-10-28 PROCEDURE — 90853 GROUP PSYCHOTHERAPY: CPT

## 2024-10-28 NOTE — GROUP NOTE
Psychotherapy Group Note    PATIENT'S NAME: Lora Cardenas  MRN:   [id number]  :   1975  ACCT. NUMBER: [id number]  DATE OF SERVICE: 10/28/24  START TIME: 10:00 AM  END TIME: 10:50 AM  FACILITATOR: Mela Olsen LICSW  TOPIC: MH EBP Group: Self-Awareness  [facility]  TRACK: IOP 1    NUMBER OF PARTICIPANTS: 7    Summary of Group / Topics Discussed:  Self-Awareness: Values: Patients identified personal values by examining development of their current values and how their values influence their daily functioning and life choices. Patients explored the impact of their values on their thoughts, feelings, and actions. Patients discussed definition of personal values and how they develop and change over time. The goal is to help patients reconcile value conflicts and achieve balance and flexibility to improve mood and daily functioning.     Patient Session Goals / Objectives:  Examined development of values and impact of values on functioning  Identified and prioritized important values related to current well-being   Identified strategies to change or enhance values to positively impact symptoms  Assisted patients to find ways to adapt functioning to better fit their values      Patient Participation / Response:  Fully participated with the group by sharing personal reflections / insights and openly received / provided feedback with other participants.    Demonstrated understanding of topics discussed through group discussion and participation, Demonstrated understanding of values, strengths, and challenges to learn about themselves, and Identified / Expressed readiness to act intentionally, increase self-compassion, promote personal growth    Treatment Plan:  Patient has a current master individualized treatment plan.  See Epic treatment plan for more information.    BRYNN Christiansen

## 2024-10-28 NOTE — GROUP NOTE
"Process Group Note    PATIENT'S NAME: Lora Cardenas  MRN:   [id number]  :   1975  ACCT. NUMBER: [id number]  DATE OF SERVICE: 10/28/24  START TIME:  9:00 AM  END TIME:  9:50 AM  FACILITATOR: Mela Olsen LICSW  TOPIC:  Process Group    Diagnoses:     296.33 (F33.2) Major Depressive Disorder, Recurrent Episode, Severe _ and With anxious distress.    [facility]  TRACK: IOP 1    NUMBER OF PARTICIPANTS: 7        Data:    Session content: At the start of this group, patients were invited to check in by identifying themselves, describing their current emotional status, and identifying issues to address in this group.   Area(s) of treatment focus addressed in this session included Symptom Management and Personal Safety.  Pt reports feeling hopeful, motivated and brave. She states \"It feels good to trust myself\". Pt feels positive about her return to work last week. She feels confident that she can resume her job, but wants to set boundaries there. Pt identifies coping skills as: radical acceptance, gratitude, nature and photography. Denies safety concerns.    Therapeutic Interventions/Treatment Strategies:  Psychotherapist offered support, feedback and validation and reinforced use of skills. Treatment modalities used include Cognitive Behavioral Therapy. Interventions include Behavioral Activation: Explored how behaviors effect mood and interact with thoughts and feelings and Symptoms Management: Promoted understanding of their diagnoses and how it impacts their functioning.    Assessment:    Patient response:   Patient responded to session by accepting feedback, giving feedback, listening, focusing on goals, being attentive, and accepting support    Possible barriers to participation / learning include: and no barriers identified    Health Issues:   None reported       Substance Use Review:   Substance Use: No active concerns identified.    Mental " Status/Behavioral Observations  Appearance:   Appropriate   Eye Contact:   Good   Psychomotor Behavior: Normal   Attitude:   Cooperative   Orientation:   All  Speech   Rate / Production: Normal    Volume:  Normal   Mood:    Anxious  Normal  Affect:    Appropriate   Thought Content:   Clear  Thought Form:  Coherent  Logical     Insight:    Good     Plan:   Safety Plan: No current safety concerns identified.  Recommended that patient call 911 or go to the local ED should there be a change in any of these risk factors.   Barriers to treatment: None identified  Patient Contracts (see media tab):  None  Substance Use: Not addressed in session   Continue or Discharge: Patient will continue in Adult Day Treatment (ADT)  as planned. Patient is likely to benefit from learning and using skills as they work toward the goals identified in their treatment plan.      Mela Whiteside, [facility]  October 28, 2024

## 2024-10-28 NOTE — GROUP NOTE
Psychotherapy Group Note    PATIENT'S NAME: Lora Cardenas  MRN:   [id number]  :   1975  ACCT. NUMBER: [id number]  DATE OF SERVICE: 10/28/24  START TIME: 11:00 AM  END TIME: 11:50 AM  FACILITATOR: Lynn Ibrahim LICSW  TOPIC: MH EBP Group: Coping Skills  [facility] Adult Mental Health Outpatient Programs  TRACK: IOP 1    NUMBER OF PARTICIPANTS: 7    Summary of Group / Topics Discussed:  Coping Skills: Additional Coping Skills:  Patients discussed and practiced the distress tolerance skill of Willingness vs. Willfulness.  Reviewed the benefits of applying the aforementioned DBT coping strategies.  Patients explored how these strategies might be applied to daily stressors or distressing situations as well as having more acceptance.    Patient Session Goals / Objectives:       Understand the purpose and benefits of applying willingness coping strategies       Identified traits of willfulness.       Introduced and practiced half smile and hands up tools.      Patient Participation / Response:  Fully participated with the group by sharing personal reflections / insights and openly received / provided feedback with other participants.    Demonstrated understanding of topics discussed through group discussion and participation, Expressed understanding of the relevance / importance of coping skills at distressing times in life, Demonstrated knowledge of when to consider using a variety of coping skills in daily life, Identified barriers to applying coping skills, Identified 2-3 positive coping strategies that have helped maintain / improve symptoms in the past, and Identified / Expressed personal readiness to practice new coping skills    Treatment Plan:  Patient has a current master individualized treatment plan.  See Epic treatment plan for more information.    BRYNN Balderrama

## 2024-10-30 ENCOUNTER — HOSPITAL ENCOUNTER (OUTPATIENT)
Dept: BEHAVIORAL HEALTH | Facility: CLINIC | Age: 49
Discharge: HOME | End: 2024-10-30
Attending: PSYCHIATRY & NEUROLOGY
Payer: COMMERCIAL

## 2024-10-30 DIAGNOSIS — F33.2 MAJOR DEPRESSIVE DISORDER, RECURRENT EPISODE, SEVERE WITH ANXIOUS DISTRESS (H): Primary | ICD-10-CM

## 2024-10-30 PROCEDURE — 90853 GROUP PSYCHOTHERAPY: CPT

## 2024-10-30 PROCEDURE — 99214 OFFICE O/P EST MOD 30 MIN: CPT

## 2024-10-30 PROCEDURE — 90853 GROUP PSYCHOTHERAPY: CPT | Performed by: PSYCHOLOGIST

## 2024-10-30 RX ORDER — BUPROPION HYDROCHLORIDE 300 MG/1
300 TABLET ORAL EVERY MORNING
Qty: 30 TABLET | Refills: 1 | Status: SHIPPED | OUTPATIENT
Start: 2024-10-30

## 2024-10-30 NOTE — GROUP NOTE
Psychotherapy Group Note    PATIENT'S NAME: Lora Cardenas  MRN:   [id number]  :   1975  [id number]  DATE OF SERVICE: 10/30/24  START TIME: 10:00 AM  END TIME: 10:50 AM  FACILITATOR: Isabel Fonseca Psy.D, LP  TOPIC: MH EBP Group: Coping Skills  [facility] Mental Health Outpatient Programs  TRACK: iop1    NUMBER OF PARTICIPANTS: 6    Summary of Group / Topics Discussed:  Coping Skills: Relapse Planning: Patients discussed and increased understanding of how anticipating and planning for possible increased symptoms is a proactive way to reduce the likelihood of relapse.  Patients shared individualized factors that may lead to increased symptoms and decompensation in functioning.  Each patient developed a relapse prevention plan designed to help them recognize when they may have increasing symptoms requiring them to take action and notify their key supports and care team.      Patient Session Goals / Objectives:  Identify and understand what factors may contribute to symptom relapse.  Identify actions that may be taken to manage increased symptoms/stressors.  Create an individualized written relapse plan  Problem solve barriers to plan creation and implementation  Share relapse plan with key support people      Patient Participation / Response:  Fully participated with the group by sharing personal reflections / insights and openly received / provided feedback with other participants.    Expressed understanding of the relevance / importance of coping skills at distressing times in life    Treatment Plan:  Patient has a current master individualized treatment plan.  See Epic treatment plan for more information.    Isabel Fonseca Psy.D, LP

## 2024-10-30 NOTE — PROGRESS NOTES
"[facility]   Adult Mental Health Outpatient Programs  Psychiatric Progress Record    Program Track: IOP 1    PATIENT'S NAME: Lora Cardenas  MRN:   [id number]  :   1975  ACCT. NUMBER: [id number]  DATE OF SERVICE: 10/30/24    Interval History:  \"good.\" Lora presents today for follow-up and ongoing program supervision.   Endorses:  I am doing very good, a lot better  I am still struggling with depression. Anxiety feels more in control than it ever has  Still lack motivation  Difficulty keeping a schedule  Procrastination, not wanting to do things  No suicide thoughts. None  I using skills more because I know what hey are  I am aware more about people around me ans my responsibilities  Medication compliant. I feel dizzy when I change positions, other than dry mouth, especially with night meds, No side effects    Review of Symptoms  Sleep: inconsistent. Some days, Trazodone works. Some days, I cannot sleep. Some days difficulty falling asleep  Appetite: It improved compared to when I started.   Depression  Anxiety  Suicidal ideation: denies current or recent suicidal ideation or behavior  Thoughts of non-suicidal self-injury: denied  Recent self-injurious behavior: denied  Homicidal ideation: denied  Other safety concerns: denied    Activities of Daily Living and Related Systems Impacted by Illness:  Hygiene: see above  Socialization: isoating  Activities of Daily Living: (cleaning, shopping, bills, etc.): see above  Concerns related to work: no    Substance use:  I had an old fashioned alcohol beverage on my birthday    Response to Program Interventions:  It is so good.     Medications:  Current Outpatient Medications   Medication Sig Dispense Refill    amLODIPine (NORVASC) 10 MG tablet Take 1 tablet by mouth daily      buPROPion (WELLBUTRIN XL) 150 MG 24 hr tablet Take 1 tablet (150 mg) by mouth every morning. (Patient taking differently: Take 300 mg by mouth every morning.) 30 tablet 0    FLUoxetine " (PROZAC) 40 MG capsule Take 1 capsule (40 mg) by mouth daily. 30 capsule 0    LORazepam (ATIVAN) 0.5 MG tablet Take 1 tablet (0.5 mg) by mouth daily as needed for anxiety. 10 tablet 0    traZODone (DESYREL) 50 MG tablet Take 1 tablet (50 mg) by mouth at bedtime. 30 tablet 0    Cetirizine HCl (ZYRTEC ALLERGY PO) Take 10 mg by mouth daily.      estradiol (VIVELLE-DOT) 0.05 MG/24HR bi-weekly patch Place 1 patch onto the skin twice a week Sundays and Thursdays      fluticasone (FLONASE) 50 MCG/ACT nasal spray Spray 2 sprays into both nostrils daily as needed (Patient not taking: Reported on 10/30/2024)      hydrOXYzine (ATARAX) 25 MG tablet Take 25 mg by mouth      multivitamin, therapeutic with minerals (THERA-VIT-M) TABS Take 1 tablet by mouth daily. (Patient not taking: Reported on 10/9/2024)      propranolol ER (INDERAL LA) 60 MG 24 hr capsule Take 60 mg by mouth daily      spironolactone (ALDACTONE) 50 MG tablet Take 50 mg by mouth      VITAMIN D, CHOLECALCIFEROL, PO Take 1,000 Units by mouth daily.         The above list was reviewed and updated in EPIC with patient today.     Patient is taking medications as prescribed and denies adverse effects    Laboratory Results:  Most recent labs reviewed. Pertinent updates/findings: None.     Mental Status Examination:  Vital Signs: There were no vitals taken for this visit.   Appearance: adequately groomed, appears stated age, and in no apparent distress.  Attitude: cooperative   Eye Contact: good   Muscle Strength and Tone: normal  Psychomotor Behavior: normal or unremarkable   Gait and Station: normal width, turn, arm swing  Speech: clear, coherent, decreased prosody, regular rate, regular rhythm, and fluent  Associations: No loosening of associations  Thought Process: coherent and goal directed  Thought Content: no evidence of suicidal ideation or homicidal ideation, no evidence of psychotic thought, no auditory hallucinations present, and no visual hallucinations  "present  Mood: \"anxious and depressed\"  Affect: mood congruent  Insight: good  Judgment: intact, adequate for safety  Impulse Control: intact  Oriented to: time, place, person, and situation  Attention Span and Concentration: Normal  Language: Intact  Recent and Remote Memory: Delayed & immediate recall intact  Fund of Knowledge/Assessment of Intelligence: Average  Capacity of Activities of Daily Living: Independent, able to participate in programmatic care services.    Diagnosis/es:    ICD-10-CM    1. Major depressive disorder, recurrent episode, severe with anxious distress (H)  F33.2           Assessment/Plan:  Lora presents today for follow-up psychiatric evaluation and assessment of progress. She is making progress in managing stressors and applying coping skills. However, she continues to struggle with apathy. She has agreed to try an increased dose of Wellbutrin to address this. She denies any suicidal ideation or other safety concerns.  Follow up in 1 week or sooner as needed    Depression   Overall improved  Continue to engage in psychotherapy  Continue with current medication regimen  INCREASE Wellbutrin 300 mg   Fluoxetine 40 mg daily  Trazodone 50 mg at bedtime  Improve sleep hygiene  Maintain a balanced diet  Engage in physical activities as tolerated    Anxiety  Overall improved   As noted above  Lorazepam 0.5 mg once a day as needed   Postponed discontinuation due to increase in Wellbutrin  Hydroxyzine 25 mg  twice daily      Suicide ideation  Overall markedly improved  Continuous assessment and monitoring  Safety plan      Safety Assessment:  Lora reports suicidal ideation and/or non-suicidal self-injury or thoughts thereof as noted above  Lora is future-oriented and is engaged in treatment planning   I do not feel that Lora meets criteria for a 72-hour involuntary hold and remains appropriate for an outpatient level of care    RAINA DUARTE DNP on 10/30/2024 at 12:04 PM    Visit " Details:  Type of service: In-person  Location (patient and provider): [facility] Adult [facility] Outpatient Programmatic Care Offices    Level of Medical Decision Making:   - At least 1 chronic problem that is not stable  - Engaged in prescription drug management during visit (discussed any medication benefits, side effects, alternatives, etc.)  Discussion of management or test interpretation with external physician/other qualified healthcare professional/appropriate source - programmatic care multidisciplinary treatment team    30 min spent on the date of the encounter in chart review, patient visit, review of tests, documentation, care coordination, and/or discussion with other providers about the issues documented above.      This document completed in part using [employer] dictation software and therefore may contain inadvertent word or phrase substitutions.

## 2024-10-30 NOTE — GROUP NOTE
Psychotherapy Group Note    PATIENT'S NAME: Lora Cardenas  MRN:   [id number]  :   1975  ACCT. NUMBER: [id number]  DATE OF SERVICE: 10/30/24  START TIME: 11:00 AM  END TIME: 11:50 AM  FACILITATOR: Lynn Ibrahim LICSW  TOPIC: MH EBP Group: Self-Awareness  [facility] Adult Mental Health Outpatient Programs  TRACK: IOP 1    NUMBER OF PARTICIPANTS: 6    Summary of Group / Topics Discussed:  Self-Awareness: Self-Compassion part 2: Patients received overview of key concepts in developing self-compassion. Patients discussed mindfulness, self-kindness, and finding common humanity. Patients identified their current approach to problems in their lives and learned skills for increasing self-compassion. Patients identified ways they can put self-compassion skills into practice and problem solve barriers to application of skills.     Patient Session Goals / Objectives:  [place] components of self-compassion  Identify ways to practice self-compassion in daily life  Problem solve barriers to self-compassion practice      Patient Participation / Response:  Fully participated with the group by sharing personal reflections / insights and openly received / provided feedback with other participants.    Demonstrated understanding of topics discussed through group discussion and participation, Demonstrated understanding of values, strengths, and challenges to learn about themselves, Identified / Expressed readiness to act intentionally, increase self-compassion, promote personal growth, Verbalized understanding of ways to proactively manage illness, and Identified plan to address barriers to practicing skills that promote self-awareness     Treatment Plan:  Patient has a current master individualized treatment plan.  See Epic treatment plan for more information.    BRYNN Balderrama

## 2024-10-30 NOTE — GROUP NOTE
Process Group Note    PATIENT'S NAME: Lora Cardenas  MRN:   [id number]  :   1975  ACCT. NUMBER: [id number]  DATE OF SERVICE: 10/30/24  START TIME:  9:00 AM  END TIME:  9:50 AM  FACILITATOR: Magda [facility]ta [facility]  TOPIC:  Process Group    Diagnoses:  296.33 (F33.2) Major Depressive Disorder, Recurrent Episode, Severe _ and With anxious distress.       [facility] Mental Health Outpatient Programs  TRACK: IOP 1    NUMBER OF PARTICIPANTS: 6        Data:    Session content: At the start of this group, patients were invited to check in by identifying themselves, describing their current emotional status, and identifying issues to address in this group.   Area(s) of treatment focus addressed in this session included Symptom Management, Personal Safety, and Community Resources/Discharge Planning.  Lora reports she will have family over for . This holiday is her sons birthday and he is estranged from her. This will be the first holiday they are . Patient is leaning on family, and being mindful to cope with this new experience. Patient reported no concerns with suicide.    Therapeutic Interventions/Treatment Strategies:  Treatment modalities used include Cognitive Behavioral Therapy and Dialectical Behavioral Therapy.    Assessment:    Patient response:   Patient responded to session by accepting feedback, giving feedback, and listening    Possible barriers to participation / learning include: severity of symptoms    Health Issues:   None reported       Substance Use Review:   Substance Use: No active concerns identified.    Mental Status/Behavioral Observations  Appearance:   Appropriate   Eye Contact:   Good   Psychomotor Behavior: Normal   Attitude:   Cooperative   Orientation:   All  Speech   Rate / Production: Normal    Volume:  Normal   Mood:    Normal  Affect:    Appropriate   Thought Content:   Clear  Thought Form:  Coherent  Logical     Insight:    Good      Plan:   Safety Plan: No current safety concerns identified.  Recommended that patient call 911 or go to the local ED should there be a change in any of these risk factors.   Barriers to treatment: None identified  Patient Contracts (see media tab):  None  Substance Use: Not addressed in session   Continue or Discharge: Patient will continue in Adult Day Treatment (ADT)  as planned. Patient is likely to benefit from learning and using skills as they work toward the goals identified in their treatment plan.      Magda Perdue, LICSW  October 30, 2024

## 2024-10-31 ENCOUNTER — HOSPITAL ENCOUNTER (OUTPATIENT)
Dept: BEHAVIORAL HEALTH | Facility: CLINIC | Age: 49
Discharge: HOME | End: 2024-10-31
Attending: PSYCHIATRY & NEUROLOGY
Payer: COMMERCIAL

## 2024-10-31 PROCEDURE — 90853 GROUP PSYCHOTHERAPY: CPT

## 2024-10-31 NOTE — GROUP NOTE
Psychotherapy Group Note    PATIENT'S NAME: Lora Cardenas  MRN:   [id number]  :   1975  ACCT. NUMBER: [id number]  DATE OF SERVICE: 10/31/24  START TIME: 10:00 AM  END TIME: 10:50 AM  FACILITATOR: Magda Matta LICSW  TOPIC: MH EBP Group: [place]  [facility] Adult Mental Health Outpatient Programs  TRACK: IOP 1     NUMBER OF PARTICIPANTS: 5    Summary of Group / Topics Discussed:  Mindfulness: What is Mindfulness: Patients received an overview on what mindfulness is and how mindfulness can benefit general health, mental health symptoms, and stressors. The history of mindfulness, its application to mental health therapies, and key concepts were also discussed. Patients discussed current awareness, knowledge, and practice of mindfulness skills. Patients also discussed barriers to mindfulness practice.     Patient Session Goals / Objectives:  Demonstrated understanding of key concepts and application to daily life  Identified when/how to use mindfulness   Resolved barriers to practice  Identified plan to use mindfulness in daily life      Patient Participation / Response:  Fully participated with the group by sharing personal reflections / insights and openly received / provided feedback with other participants.    Demonstrated understanding of topics discussed through group discussion and participation    Treatment Plan:  Patient has a current master individualized treatment plan.  See Epic treatment plan for more information.    BRYNN Edward

## 2024-10-31 NOTE — GROUP NOTE
Process Group Note    PATIENT'S NAME: Lora Cardenas  MRN:   [id number]  :   1975  ACCT. NUMBER: [id number]  DATE OF SERVICE: 10/31/24  START TIME:  9:00 AM  END TIME:  9:50 AM  FACILITATOR: Magda Matta [facility]  TOPIC:  Process Group    Diagnoses:  296.33 (F33.2) Major Depressive Disorder, Recurrent Episode, Severe _ and With anxious distress.       [facility] Mental Health Outpatient Programs  TRACK: IOP 1     NUMBER OF PARTICIPANTS: 5        Data:    Session content: At the start of this group, patients were invited to check in by identifying themselves, describing their current emotional status, and identifying issues to address in this group.   Area(s) of treatment focus addressed in this session included Symptom Management, Personal Safety, and Community Resources/Discharge Planning.  Lora reported not having the greatest day, but is trying to stay mindful. She reports she is not doing great. Patient reported not being able to put her finger on it. Patient reported using the coping skill of cooking more. Patient reported that her children showed her love this morning. Patient reported no safety concerns.     Therapeutic Interventions/Treatment Strategies:  Treatment modalities used include Cognitive Behavioral Therapy and Dialectical Behavioral Therapy.    Assessment:    Patient response:   Patient responded to session by accepting feedback, giving feedback, and listening    Possible barriers to participation / learning include: severity of symptoms    Health Issues:   None reported       Substance Use Review:   Substance Use: No active concerns identified.    Mental Status/Behavioral Observations  Appearance:   Appropriate   Eye Contact:   Good   Psychomotor Behavior: Normal   Attitude:   Cooperative   Orientation:   All  Speech   Rate / Production: Normal    Volume:  Normal   Mood:    Normal  Affect:    Appropriate   Thought Content:   Clear  Thought Form:  Coherent   Logical     Insight:    Good     Plan:   Safety Plan: No current safety concerns identified.  Recommended that patient call 911 or go to the local ED should there be a change in any of these risk factors.   Barriers to treatment: None identified  Patient Contracts (see media tab):  None  Substance Use: Not addressed in session   Continue or Discharge: Patient will continue in Adult Day Treatment (ADT)  as planned. Patient is likely to benefit from learning and using skills as they work toward the goals identified in their treatment plan.      Magda Perdue, [facility] CenterSW  October 31, 2024

## 2024-10-31 NOTE — GROUP NOTE
Psychotherapy Group Note    PATIENT'S NAME: Lora Cardenas  MRN:   [id number]  :   1975  ACCT. NUMBER: [id number]  DATE OF SERVICE: 10/31/24  START TIME: 11:00 AM  END TIME: 11:50 AM  FACILITATOR: Lynn Ibrahim LICSW  TOPIC: MH EBP Group: Symptom Awareness  [facility] Mental Health Outpatient Programs  TRACK: IOP 1    NUMBER OF PARTICIPANTS: 5    Summary of Group / Topics Discussed:  Symptom Awareness: Symptom Observation and Tracking: An overview of symptom observation and tracking was presented to help patients identify specific symptoms and identify patterns. This topic will also assist patient in identifying progress towards goal of decreasing severity of symptoms and increasing overall functioning. Patients completed a symptom check list in session. Patient was assisted in identifying baseline functioning, patterns, and ways to assess current symptoms. Patient was also assisted in identifying a tool or strategy to continue to track or monitor symptoms over a period of time.       Patient Session Goals / Objectives:  Identified patient individual symptoms and experiences  Identified potential symptom patterns and factors that contribute to changes in symptom severity      Patient Participation / Response:  Fully participated with the group by sharing personal reflections / insights and openly received / provided feedback with other participants.    Demonstrated understanding of topics discussed through group discussion and participation, Demonstrated understanding of how information regarding symptoms can assist in management of symptoms, Identified / Expressed personal readiness to increase awareness of symptoms and apply skills as necessary, Verbalized understanding of how awareness can benefit mental health symptoms , and Identified plan to address barriers to increase awareness and knowledge about diagnoses and symptoms     Treatment Plan:  Patient has a current master individualized  treatment plan.  See Epic treatment plan for more information.    BRYNN Balderrama

## 2024-11-03 ENCOUNTER — HEALTH MAINTENANCE LETTER (OUTPATIENT)
Age: 49
End: 2024-11-03

## 2024-11-04 ENCOUNTER — HOSPITAL ENCOUNTER (OUTPATIENT)
Dept: BEHAVIORAL HEALTH | Facility: CLINIC | Age: 49
Discharge: HOME | End: 2024-11-04
Attending: PSYCHIATRY & NEUROLOGY
Payer: COMMERCIAL

## 2024-11-04 PROCEDURE — 90853 GROUP PSYCHOTHERAPY: CPT

## 2024-11-04 NOTE — GROUP NOTE
Process Group Note    PATIENT'S NAME: Lora Cardenas  MRN:   [id number]  :   1975  ACCT. NUMBER: [id number]  DATE OF SERVICE: 24  START TIME:  9:00 AM  END TIME:  9:50 AM  FACILITATOR: Mela Olsen LICSW  TOPIC:  Process Group    Diagnoses:     296.33 (F33.2) Major Depressive Disorder, Recurrent Episode, Severe _ and With anxious distress.    [facility] Mental Health Outpatient Programs  TRACK: IOP 1    NUMBER OF PARTICIPANTS: 8        Data:    Session content: At the start of this group, patients were invited to check in by identifying themselves, describing their current emotional status, and identifying issues to address in this group.   Area(s) of treatment focus addressed in this session included Symptom Management and Personal Safety.  Pt reports feeling hopeful and brave. She will discharge from the program at the end of the week. Pt is using a variety of coping skills. She wants to remember that she does not need to do everything that she typically has done. Denies safety concerns.    Therapeutic Interventions/Treatment Strategies:  Psychotherapist offered support, feedback and validation and reinforced use of skills. Treatment modalities used include Cognitive Behavioral Therapy. Interventions include Symptoms Management: Promoted understanding of their diagnoses and how it impacts their functioning and Relationship Skills: Encouraged development and maintenance  of healthy boundaries.    Assessment:    Patient response:   Patient responded to session by accepting feedback, giving feedback, listening, focusing on goals, being attentive, and accepting support    Possible barriers to participation / learning include: and no barriers identified    Health Issues:   None reported       Substance Use Review:   Substance Use: No active concerns identified.    Mental Status/Behavioral Observations  Appearance:   Appropriate   Eye Contact:   Good   Psychomotor Behavior: Normal    Attitude:   Cooperative   Orientation:   All  Speech   Rate / Production: Normal    Volume:  Normal   Mood:    Normal  Affect:    Appropriate   Thought Content:   Clear  Thought Form:  Coherent  Logical     Insight:    Good     Plan:   Safety Plan: No current safety concerns identified.  Recommended that patient call 911 or go to the local ED should there be a change in any of these risk factors.   Barriers to treatment: None identified  Patient Contracts (see media tab):  None  Substance Use: Not addressed in session   Continue or Discharge: Patient will continue in Adult Day Treatment (ADT)  as planned. Patient is likely to benefit from learning and using skills as they work toward the goals identified in their treatment plan.      Mela Whiteside, [facility] CenterSW  November 4, 2024

## 2024-11-04 NOTE — GROUP NOTE
Psychotherapy Group Note    PATIENT'S NAME: Lora Cardenas  MRN:   [id number]  :   1975  ACCT. NUMBER: [id number]  DATE OF SERVICE: 24  START TIME: 11:00 AM  END TIME: 11:50 AM  FACILITATOR: Lynn Ibrahim LICSW  TOPIC: MH EBP Group: Coping Skills  [facility] Adult Mental Health Outpatient Programs  TRACK: IOP 1    NUMBER OF PARTICIPANTS: 8    Summary of Group / Topics Discussed:  Coping Skills: Radical Acceptance: Patients learned radical acceptance as a way to tolerate heightened stress in the moment.  Patients identified situations that necessitate radical acceptance.  They focused on applying acceptance of the moment to tolerate difficult emotions and events. Patients discussed how to distinguish when this can be useful in their lives and when other skills are more relevant or helpful.    Patient Session Goals / Objectives:  Understand that some amount of pain exists for each of us in our lives  Process the difficulty of acceptance in our lives and benefits of radical acceptance to mood and functioning.  Demonstrate understanding of when to use the radical acceptance to tolerate distress by providing examples of situations where this could be applied.  Identify barriers to applying radical acceptance to difficult situations and explore strategies to overcome them      Patient Participation / Response:  Fully participated with the group by sharing personal reflections / insights and openly received / provided feedback with other participants.    Demonstrated understanding of topics discussed through group discussion and participation, Expressed understanding of the relevance / importance of coping skills at distressing times in life, Demonstrated knowledge of when to consider using a variety of coping skills in daily life, Identified barriers to applying coping skills, and Identified 2-3 positive coping strategies that have helped maintain / improve symptoms in the past    Treatment  Plan:  Patient has a current master individualized treatment plan.  See Epic treatment plan for more information.    BRYNN Balderrama

## 2024-11-04 NOTE — GROUP NOTE
Psychotherapy Group Note    PATIENT'S NAME: Lora Cardenas  MRN:   [id number]  :   1975  ACCT. NUMBER: [id number]  DATE OF SERVICE: 24  START TIME: 10:00 AM  END TIME: 10:50 AM  FACILITATOR: Mela Olsen LICSW  TOPIC: MH EBP Group: Self-Awareness  [facility] Adult Mental Health Outpatient Programs  TRACK: IOP 1    NUMBER OF PARTICIPANTS: 8    Summary of Group / Topics Discussed:  Self-Awareness: Gratitude part 2: Topic focused on assisting patients in identifying key concepts in gratitude. Patients discussed the benefits of practicing gratitude and its impact on mood improvement, mindfulness, and perspective. Patients worked to increase time spent on recognition and appreciation of what is positive and working in their lives. Patients discussed the concepts and benefits of feeling grateful. The goal is to reduce rumination and negative thinking resulting in increased mindfulness and resilience. Patients specifically discussed how they can practice and problem solve barriers to daily gratitude practice.     Patient Session Goals / Objectives:  [place] the concept and benefits of gratitude  Identified ways to practice gratitude in daily life  Problem solved barriers to practicing gratitude      Patient Participation / Response:  Fully participated with the group by sharing personal reflections / insights and openly received / provided feedback with other participants.    Demonstrated understanding of topics discussed through group discussion and participation, Demonstrated understanding of values, strengths, and challenges to learn about themselves, and Identified / Expressed readiness to act intentionally, increase self-compassion, promote personal growth    Treatment Plan:  Patient has a current master individualized treatment plan.  See Epic treatment plan for more information.    BRYNN Christiansen

## 2024-11-05 ENCOUNTER — HOSPITAL ENCOUNTER (OUTPATIENT)
Dept: BEHAVIORAL HEALTH | Facility: CLINIC | Age: 49
Discharge: HOME | End: 2024-11-05
Attending: PSYCHIATRY & NEUROLOGY
Payer: COMMERCIAL

## 2024-11-05 PROCEDURE — 90853 GROUP PSYCHOTHERAPY: CPT

## 2024-11-05 NOTE — GROUP NOTE
Psychotherapy Group Note    PATIENT'S NAME: Lora Cardenas  MRN:   [id number]  :   1975  ACCT. NUMBER: [id number]  DATE OF SERVICE: 24  START TIME: 11:00 AM  END TIME: 11:50 AM  FACILITATOR: Lynn Ibrahim LICSW  TOPIC: MH EBP Group: Coping Skills  [facility] Adult Mental Health Outpatient Programs  TRACK: IOP 1    NUMBER OF PARTICIPANTS: 6    Summary of Group / Topics Discussed:  Coping Skills: Radical Acceptance part 2: Patients learned radical acceptance as a way to tolerate heightened stress in the moment.  Patients identified situations that necessitate radical acceptance.  They focused on applying acceptance of the moment to tolerate difficult emotions and events. Patients discussed how to distinguish when this can be useful in their lives and when other skills are more relevant or helpful.    Patient Session Goals / Objectives:  Understand that some amount of pain exists for each of us in our lives  Process the difficulty of acceptance in our lives and benefits of radical acceptance to mood and functioning.  Demonstrate understanding of when to use the radical acceptance to tolerate distress by providing examples of situations where this could be applied.  Identify barriers to applying radical acceptance to difficult situations and explore strategies to overcome them      Patient Participation / Response:  Fully participated with the group by sharing personal reflections / insights and openly received / provided feedback with other participants.    Demonstrated understanding of topics discussed through group discussion and participation, Expressed understanding of the relevance / importance of coping skills at distressing times in life, Demonstrated knowledge of when to consider using a variety of coping skills in daily life, Identified barriers to applying coping skills, Identified 2-3 positive coping strategies that have helped maintain / improve symptoms in the past, and Identified /  Expressed personal readiness to practice new coping skills    Treatment Plan:  Patient has a current master individualized treatment plan.  See Epic treatment plan for more information.    NURYS BalderramaSW

## 2024-11-05 NOTE — GROUP NOTE
Psychotherapy Group Note    PATIENT'S NAME: Lora Cardenas  MRN:   [id number]  :   1975  ACCT. NUMBER: [id number]  DATE OF SERVICE: 24  START TIME: 10:00 AM  END TIME: 10:50 AM  FACILITATOR: Mela Whiteside LICSW  TOPIC: MH EBP Group: Coping Skills  [facility] Adult Mental Health Outpatient Programs  TRACK: IOP 1    NUMBER OF PARTICIPANTS: 6    Summary of Group / Topics Discussed:  Coping Skills: Building Positive Experiences: Patients discussed the importance of planning and engaging in positive experiences, as strategies to increase positive thinking, hope, and self-worth.  Explored the benefits of planning / creating positive experiences, including recognizing and reducing negativity bias by focusing on and building positive experiences.   Several approaches to building positive experiences were presented and discussed relevant to each patient.      Patient Session Goals / Objectives:  Understand the purpose of planning / creating / participating / sharing in positive experiences.  Explore patient s experiences related to negative thinking and how it influences activities and moodIdentify current positive events in patient s life.   Set goals to increase a variety of positive experiences.  Address barriers to planning / engaging in positive experiences.      Patient Participation / Response:  Fully participated with the group by sharing personal reflections / insights and openly received / provided feedback with other participants.    Demonstrated understanding of topics discussed through group discussion and participation, Expressed understanding of the relevance / importance of coping skills at distressing times in life, Identified barriers to applying coping skills, and Identified / Expressed personal readiness to practice new coping skills    Treatment Plan:  Patient has a current master individualized treatment plan.  See Epic treatment plan for more information.    Mela Whiteside,  LICSW

## 2024-11-05 NOTE — PROGRESS NOTES
Outpatient Mental Health Program Discharge Summary / Instructions - Adult       PATIENT'S NAME: Lora Cardenas   MRN:   [id number]  :   1975    PROGRAM PARTICIPATION: Adult Day Treatment (ADT)  Track: IOP 1    ADMISSION DATE: 9/10/24  DISCHARGE DATE: 24      Reason for Discharge:   Completed treatment: factors leading to admission have been addressed to the extent that the patient can be safely transitioned to a less intensive level of care.     Diagnosis:      296.33 (F33.2) Major Depressive Disorder, Recurrent Episode, Severe _ and With anxious distress.    Medications: (include name, dose, and frequency)    Current Outpatient Medications   Medication Sig Dispense Refill    amLODIPine (NORVASC) 10 MG tablet Take 1 tablet by mouth daily      buPROPion (WELLBUTRIN XL) 300 MG 24 hr tablet Take 1 tablet (300 mg) by mouth every morning. 30 tablet 1    Cetirizine HCl (ZYRTEC ALLERGY PO) Take 10 mg by mouth daily.      estradiol (VIVELLE-DOT) 0.05 MG/24HR bi-weekly patch Place 1 patch onto the skin twice a week Sundays and       FLUoxetine (PROZAC) 40 MG capsule Take 1 capsule (40 mg) by mouth daily. 30 capsule 0    fluticasone (FLONASE) 50 MCG/ACT nasal spray Spray 2 sprays into both nostrils daily as needed (Patient not taking: Reported on 10/30/2024)      hydrOXYzine (ATARAX) 25 MG tablet Take 25 mg by mouth      LORazepam (ATIVAN) 0.5 MG tablet Take 1 tablet (0.5 mg) by mouth daily as needed for anxiety. 10 tablet 0    multivitamin, therapeutic with minerals (THERA-VIT-M) TABS Take 1 tablet by mouth daily. (Patient not taking: Reported on 10/9/2024)      propranolol ER (INDERAL LA) 60 MG 24 hr capsule Take 60 mg by mouth daily      spironolactone (ALDACTONE) 50 MG tablet Take 50 mg by mouth      traZODone (DESYREL) 50 MG tablet Take 1 tablet (50 mg) by mouth at bedtime. 30 tablet 0    VITAMIN D, CHOLECALCIFEROL, PO Take 1,000 Units by mouth daily.       No current facility-administered  medications for this visit.          DISCHARGE PLAN / RECOMMENDATIONS TO MANAGE SYMPTOMS AND PREVENT RELAPSE:   Take medication as prescribed. Avoid alcohol and illicit drugs. Keep appointments.     Use coping skills daily including: eat/sleep/exercise adequately, talk to someone that you trust, use  mindfulness/self-compassion/boundaries/assertiveness. Engage in prayer and healthy leisure activities, spend time in nature    Follow up with: Psychiatrist: Penelope Reyna at [facility]. Next appointment:  Dec 5                          Individual therapist: Joleen Whatley at [facility]. Next appointment: 11/8/24 at 11    Community Support groups available: BLANQUITA MARTINEZ    PERSONAL SAFETY / CRISIS MANAGEMENT:  Follow your individualized Safety Coping Plan.  Report increased symptoms and safety concerns to your care team.  Call 911 or go to your nearest emergency department.  Use the crisis resources listed below:    Crisis Resources:  Suicide Prevention Lifeline: 8-251-112-TALK (5311)  Crisis Text Line Service (available 24 hours a day, 7 days a week): Text [place] to 652565  Call  **CRISIS (478463) from a cell phone to talk to a team of professionals who can help you.    Crisis Services By [facility]: Phone Number:   [place]     [phone]   [place]    [phone]   [place] (COPE)    [phone]   [place]    [phone]   [place]                                                    [phone] (after hours)   [place]   [phone]   [place]     [phone]       The above discharge plan and recommendations were created to help you manage your mental health and to improve your overall functioning and well-being.     Not following the above recommendations may result in an increase of symptoms, potential safety risks and a need for increased services.    We appreciate the opportunity to work with you and sincerely thank you for choosing [facility].        Your treatment team: Mela TRIPP  Raúl LICSW, Lynn Ibrahim LICSW, Lyric Guzmán DNP, Dr Hudson Camarena, Medical Director      Patient Signature: ______________________________________________________ (signed copy in media tab on EPIC) Date:___________

## 2024-11-05 NOTE — GROUP NOTE
"Process Group Note    PATIENT'S NAME: Lora Cardenas  MRN:   [id number]  :   1975  ACCT. NUMBER: [id number]  DATE OF SERVICE: 24  START TIME:  9:00 AM  END TIME:  9:50 AM  FACILITATOR: Mela Olsen LICSW  TOPIC:  Process Group    Diagnoses:     296.33 (F33.2) Major Depressive Disorder, Recurrent Episode, Severe _ and With anxious distress.    [facility] Mental Health Outpatient Programs  TRACK: IOP 1    NUMBER OF PARTICIPANTS: 6        Data:    Session content: At the start of this group, patients were invited to check in by identifying themselves, describing their current emotional status, and identifying issues to address in this group.   Area(s) of treatment focus addressed in this session included Symptom Management, Personal Safety, and Community Resources/Discharge Planning.  Pt reports feeling proud of being assertive yesterday. She states \"I feel fierce. Someone stepped on my values and I was able to speak calmly and firmly\". Pt cites various gains made here in the program and feeling ready to discharge on Thursday. Denies safety concerns.    Therapeutic Interventions/Treatment Strategies:  Psychotherapist offered support, feedback and validation and reinforced use of skills. Treatment modalities used include Cognitive Behavioral Therapy. Interventions include Relationship Skills: Encouraged development and maintenance  of healthy boundaries.    Assessment:    Patient response:   Patient responded to session by accepting feedback, giving feedback, listening, focusing on goals, being attentive, and accepting support    Possible barriers to participation / learning include: and no barriers identified    Health Issues:   None reported       Substance Use Review:   Substance Use: No active concerns identified.    Mental Status/Behavioral Observations  Appearance:   Appropriate   Eye Contact:   Good   Psychomotor Behavior: Normal   Attitude:   Cooperative "   Orientation:   All  Speech   Rate / Production: Normal    Volume:  Normal   Mood:    Normal  Affect:    Appropriate   Thought Content:   Clear  Thought Form:  Coherent  Logical     Insight:    Good     Plan:   Safety Plan: No current safety concerns identified.  Recommended that patient call 911 or go to the local ED should there be a change in any of these risk factors.   Barriers to treatment: None identified  Patient Contracts (see media tab):  None  Substance Use: Not addressed in session   Continue or Discharge: Patient will continue in Adult Day Treatment (ADT)  as planned. Patient is likely to benefit from learning and using skills as they work toward the goals identified in their treatment plan.      Mela Whiteside, [facility]  November 5, 2024

## 2024-11-06 ENCOUNTER — HOSPITAL ENCOUNTER (OUTPATIENT)
Dept: BEHAVIORAL HEALTH | Facility: CLINIC | Age: 49
Discharge: HOME | End: 2024-11-06
Attending: PSYCHIATRY & NEUROLOGY
Payer: COMMERCIAL

## 2024-11-06 PROCEDURE — 90853 GROUP PSYCHOTHERAPY: CPT

## 2024-11-06 ASSESSMENT — PATIENT HEALTH QUESTIONNAIRE - PHQ9
SUM OF ALL RESPONSES TO PHQ QUESTIONS 1-9: 4
SUM OF ALL RESPONSES TO PHQ QUESTIONS 1-9: 4
10. IF YOU CHECKED OFF ANY PROBLEMS, HOW DIFFICULT HAVE THESE PROBLEMS MADE IT FOR YOU TO DO YOUR WORK, TAKE CARE OF THINGS AT HOME, OR GET ALONG WITH OTHER PEOPLE: NOT DIFFICULT AT ALL
SUM OF ALL RESPONSES TO PHQ QUESTIONS 1-9: 4
SUM OF ALL RESPONSES TO PHQ QUESTIONS 1-9: 4
10. IF YOU CHECKED OFF ANY PROBLEMS, HOW DIFFICULT HAVE THESE PROBLEMS MADE IT FOR YOU TO DO YOUR WORK, TAKE CARE OF THINGS AT HOME, OR GET ALONG WITH OTHER PEOPLE: NOT DIFFICULT AT ALL

## 2024-11-06 NOTE — GROUP NOTE
Process Group Note    PATIENT'S NAME: Lora Cardenas  MRN:   [id number]  :   1975  ACCT. NUMBER: [id number]  DATE OF SERVICE: 24  START TIME:  9:00 AM  END TIME:  9:50 AM  FACILITATOR: Mela Olsen LICSW  TOPIC:  Process Group    Diagnoses:     296.33 (F33.2) Major Depressive Disorder, Recurrent Episode, Severe _ and With anxious distress.    [facility] Mental Health Outpatient Programs  TRACK: IOP 1    NUMBER OF PARTICIPANTS: 6        Data:    Session content: At the start of this group, patients were invited to check in by identifying themselves, describing their current emotional status, and identifying issues to address in this group.   Area(s) of treatment focus addressed in this session included Symptom Management and Personal Safety.  Pt reports feeling empowered and grateful. She describes the progress that she has made in the group and feeling ready for discharge tomorrow. Pt reports still grieving the loss of her son, but feeling able to enjoy life. Denies safety concerns.    Therapeutic Interventions/Treatment Strategies:  Psychotherapist offered support, feedback and validation and reinforced use of skills. Treatment modalities used include Cognitive Behavioral Therapy. Interventions include Behavioral Activation: Explored how behaviors effect mood and interact with thoughts and feelings and Relationship Skills: Encouraged development and maintenance  of healthy boundaries.    Assessment:    Patient response:   Patient responded to session by accepting feedback, giving feedback, listening, focusing on goals, being attentive, and accepting support    Possible barriers to participation / learning include: and no barriers identified    Health Issues:   None reported       Substance Use Review:   Substance Use: No active concerns identified.    Mental Status/Behavioral Observations  Appearance:   Appropriate   Eye Contact:   Good   Psychomotor Behavior: Normal    Attitude:   Cooperative   Orientation:   All  Speech   Rate / Production: Normal    Volume:  Normal   Mood:    Normal  Affect:    Appropriate   Thought Content:   Clear  Thought Form:  Coherent  Logical     Insight:    Good     Plan:   Safety Plan: No current safety concerns identified.  Recommended that patient call 911 or go to the local ED should there be a change in any of these risk factors.   Barriers to treatment: None identified  Patient Contracts (see media tab):  None  Substance Use: Not addressed in session   Continue or Discharge: Patient will continue in Adult Day Treatment (ADT)  as planned. Patient is likely to benefit from learning and using skills as they work toward the goals identified in their treatment plan.      Mela Whiteside, [facility]  November 6, 2024

## 2024-11-06 NOTE — GROUP NOTE
Psychoeducation Group Note    PATIENT'S NAME: Lora Cardenas  MRN:   [id number]  :   1975  ACCT. NUMBER: [id number]  DATE OF SERVICE: 24  START TIME: 11:00 AM  END TIME: 11:50 AM  FACILITATOR: Lynn Ibrahim LICSW  TOPIC: MH Life Skills Group: Resiliency Development  [facility] Mental Health Outpatient Programs  TRACK: IOP 1    NUMBER OF PARTICIPANTS: 5    Summary of Group / Topics Discussed:  Resiliency Development:  Coping Skills: Aftercare Coping Cards: Patients were taught how to begin to develop a relapse management kit for both mental and substance use/abuse by creating individualized coping cards.    Patient Session Goals / Objectives:  Identified individualized coping skills they can use for effectively managing both mental health and substance abuse/abuse symptoms   Improved awareness of different types of coping skills and how to personalize them to their unique situation and circumstances    Established a plan for practice of these skills in their own environments  Practiced and reflected on how to generalize taught skills to their everyday life      Patient Participation / Response:  Fully participated with the group by sharing personal reflections / insights and openly received / provided feedback with other participants.    Demonstrated understanding of content through discussion and creation of coping cards  and Verbalized understanding of content    Treatment Plan:  Patient has a current master individualized treatment plan.  See Epic treatment plan for more information.    BRYNN Balderrama

## 2024-11-06 NOTE — GROUP NOTE
Psychotherapy Group Note    PATIENT'S NAME: Lora Cardenas  MRN:   [id number]  :   1975  ACCT. NUMBER: [id number]  DATE OF SERVICE: 24  START TIME: 10:00 AM  END TIME: 10:50 AM  FACILITATOR: Mela Olsen LICSW  TOPIC: MH EBP Group: Coping Skills  [facility] Adult Mental Health Outpatient Programs  TRACK: IOP 1    NUMBER OF PARTICIPANTS: 5    Summary of Group / Topics Discussed:  Coping Skills: Building Positive Experiences part 2: Patients discussed the importance of planning and engaging in positive experiences, as strategies to increase positive thinking, hope, and self-worth.  Explored the benefits of planning / creating positive experiences, including recognizing and reducing negativity bias by focusing on and building positive experiences.   Several approaches to building positive experiences were presented and discussed relevant to each patient.      Patient Session Goals / Objectives:  Understand the purpose of planning / creating / participating / sharing in positive experiences.  Explore patient s experiences related to negative thinking and how it influences activities and moodIdentify current positive events in patient s life.   Set goals to increase a variety of positive experiences.  Address barriers to planning / engaging in positive experiences.      Patient Participation / Response:  Fully participated with the group by sharing personal reflections / insights and openly received / provided feedback with other participants.    Demonstrated understanding of topics discussed through group discussion and participation, Expressed understanding of the relevance / importance of coping skills at distressing times in life, and Identified / Expressed personal readiness to practice new coping skills    Treatment Plan:  Patient has a current master individualized treatment plan.  See Epic treatment plan for more information.    BRYNN Christiansen

## 2024-11-07 ENCOUNTER — HOSPITAL ENCOUNTER (OUTPATIENT)
Dept: BEHAVIORAL HEALTH | Facility: CLINIC | Age: 49
Discharge: HOME | End: 2024-11-07
Attending: PSYCHIATRY & NEUROLOGY
Payer: COMMERCIAL

## 2024-11-07 PROCEDURE — 90853 GROUP PSYCHOTHERAPY: CPT

## 2024-11-07 NOTE — GROUP NOTE
Psychotherapy Group Note    PATIENT'S NAME: Lora Cardenas  MRN:   [id number]  :   1975  ACCT. NUMBER: [id number]  DATE OF SERVICE: 24  START TIME: 11:00 AM  END TIME: 11:50 AM  FACILITATOR: Lynn Ibrahim LICSW  TOPIC: MH EBP Group: Behavioral Activation  [facility] Adult Mental Health Outpatient Programs  TRACK: IOP 1    NUMBER OF PARTICIPANTS: 7    Summary of Group / Topics Discussed:  Behavioral Activation: [place]: {Patients identified situations that prompt unwanted and unhelpful emotions / thoughts / behaviors.   Patients discussed how to problem solve by proactively using coping skills in potentially difficult situations. Components included describing the situation, brainstorming coping skills, imagining how scenario can/will unfold, rehearsing the action plan, and practicing relaxation to follow.  Patients practiced using these skills to reduce symptom distress and increase effective coping  behaviors.      Patient Session Goals / Objectives:  Identify difficult situation(s), and gain proficiency with alternative behaviors / skills to problem solve.  Increase confidence using coping skills through group practice in session.  Receive and provide feedback regarding skill development.  Apply coping skills in daily life situations.      Patient Participation / Response:  Fully participated with the group by sharing personal reflections / insights and openly received / provided feedback with other participants.    Demonstrated understanding of topics discussed through group discussion and participation, Expressed understanding of the relationship between behaviors, thoughts, and feelings, Shared experiences and challenges with making behavioral changes, Identified barriers to change, Identified / Expressed personal readiness to make behavioral change, and Identified ways to increase goal directed activities    Treatment Plan:  Patient has See Epic Treatment Plan - Patient is  discharging.    NURYS BalderramaSW

## 2024-11-07 NOTE — GROUP NOTE
Process Group Note    PATIENT'S NAME: Lora Cardenas  MRN:   [id number]  :   1975  ACCT. NUMBER: [id number]  DATE OF SERVICE: 24  START TIME:  9:00 AM  END TIME:  9:50 AM  [facility]ILIT[facility]  TOPIC:  Process Group    Diagnoses:  296.33 (F33.2) Major Depressive Disorder, Recurrent Episode, Severe _ and With anxious distress.     [facility] Outpatient Programs  TRACK: IOP 1     NUMBER OF PARTICIPANTS: 6        Data:    Session content: At the start of this group, patients were invited to check in by identifying themselves, describing their current emotional status, and identifying issues to address in this group.   Area(s) of treatment focus addressed in this session included Symptom Management, Personal Safety, and Community Resources/Discharge Planning.  Lora will be discharging today. Patient reported feeling mixed. Patient reported feeling nervous, and nauseous. Patient recently had some medication changes and is wondering if the nausea could be attributed to this. Patient reported practicing boundaries and self compassion. Patient reported being a highly perceptive and empathetic person.     Therapeutic Interventions/Treatment Strategies:  Treatment modalities used include Cognitive Behavioral Therapy and Dialectical Behavioral Therapy.    Assessment:    Patient response:   Patient responded to session by accepting feedback, giving feedback, and listening    Possible barriers to participation / learning include: severity of symptoms    Health Issues:   None reported       Substance Use Review:   Substance Use: No active concerns identified.    Mental Status/Behavioral Observations  Appearance:   Appropriate   Eye Contact:   Good   Psychomotor Behavior: Normal   Attitude:   Cooperative   Orientation:   All  Speech   Rate / Production: Normal    Volume:  Normal   Mood:    Normal  Affect:    Appropriate   Thought Content:   Clear  Thought  Form:  Coherent  Logical     Insight:    Good     Plan:   Safety Plan: No current safety concerns identified.  Recommended that patient call 911 or go to the local ED should there be a change in any of these risk factors.   Barriers to treatment: None identified  Patient Contracts (see media tab):  None  Substance Use: Not addressed in session   Continue or Discharge: Patient will continue in Adult Day Treatment (ADT)  as planned. Patient is likely to benefit from learning and using skills as they work toward the goals identified in their treatment plan.      Magda Perdue, [facility]  November 7, 2024

## 2024-11-07 NOTE — PROGRESS NOTES
[facility]   Adult Mental Health Outpatient Programs  Psychiatric Progress Record    Program Track: IOP 1    PATIENT'S NAME: Lora Cardenas  MRN:   [id number]  :   1975  ACCT. NUMBER: [id number]  DATE OF SERVICE: 24    Interval History:  Lora presents today for follow-up and ongoing program supervision.   Endorses:  Today is the last day. I am feeling good. Relieved that I weeks ago here, learning. The skills I learned are life long so important  The increased Wellbutrin is giving me hurt burn.   I am feeling very good   I am motivated  I am so nervous not coming here, but I am confident I can do it  My mood is positive  No suicide thoughts. No self harm thoughts  I have follow up with outpatient provider on Dec 5th    Review of Symptoms  Sleep: pretty well  Appetite: little bit lower  Suicidal ideation: denies current or recent suicidal ideation or behavior  Thoughts of non-suicidal self-injury: denied  Recent self-injurious behavior: denied  Homicidal ideation: denied  Other safety concerns: denied    Activities of Daily Living and Related Systems Impacted by Illness:  Hygiene: it is better. I feel more motivated and energized to do thing such as do hair and bush my teeth  I am curious to see how that goes that I am not coming here and working form home  My goal has been to maintain a good routine. Some days are still hard  Socialization: I have started socializing.   I am always been a social person  I am evaluating readiness to engage in social activities.   I am doing stuff with my friends, going to book club. I feel I have a good balance  Activities of Daily Living: (cleaning, shopping, bills, etc.): improve, see above  Concerns related to work: I am already part time. On Monday I will return full time  I am looking forward to returning to work    Substance use:  Denies    Response to Program Interventions:  I absolutely loved the group  I was an authentic place to process what I ws  going through  I love the way they cultivated a atmosphere of kindness an openness and support to everyone    Medications:  Current Outpatient Medications   Medication Sig Dispense Refill    amLODIPine (NORVASC) 10 MG tablet Take 1 tablet by mouth daily      buPROPion (WELLBUTRIN XL) 300 MG 24 hr tablet Take 1 tablet (300 mg) by mouth every morning. 30 tablet 1    Cetirizine HCl (ZYRTEC ALLERGY PO) Take 10 mg by mouth daily.      estradiol (VIVELLE-DOT) 0.05 MG/24HR bi-weekly patch Place 1 patch onto the skin twice a week Sundays and Thursdays      FLUoxetine (PROZAC) 40 MG capsule Take 1 capsule (40 mg) by mouth daily. 30 capsule 0    fluticasone (FLONASE) 50 MCG/ACT nasal spray Spray 2 sprays into both nostrils daily as needed (Patient not taking: Reported on 10/30/2024)      hydrOXYzine (ATARAX) 25 MG tablet Take 25 mg by mouth      LORazepam (ATIVAN) 0.5 MG tablet Take 1 tablet (0.5 mg) by mouth daily as needed for anxiety. 10 tablet 0    multivitamin, therapeutic with minerals (THERA-VIT-M) TABS Take 1 tablet by mouth daily. (Patient not taking: Reported on 10/9/2024)      propranolol ER (INDERAL LA) 60 MG 24 hr capsule Take 60 mg by mouth daily      spironolactone (ALDACTONE) 50 MG tablet Take 50 mg by mouth      traZODone (DESYREL) 50 MG tablet Take 1 tablet (50 mg) by mouth at bedtime. 30 tablet 0    VITAMIN D, CHOLECALCIFEROL, PO Take 1,000 Units by mouth daily.         The above list was reviewed and updated in [facility] with patient today.     Patient is taking medications as prescribed and denies adverse effects    Laboratory Results:  Most recent labs reviewed. Pertinent updates/findings: None.     Mental Status Examination:  Vital Signs: There were no vitals taken for this visit.   Appearance: adequately groomed, appears stated age, and in no apparent distress.  Attitude: cooperative   Eye Contact: good   Muscle Strength and Tone: normal  Psychomotor Behavior: normal or unremarkable   Gait and Station:  "normal width, turn, arm swing  Speech: clear, coherent, decreased prosody, regular rate, regular rhythm, and fluent  Associations: No loosening of associations  Thought Process: coherent and goal directed  Thought Content: no evidence of suicidal ideation or homicidal ideation, no evidence of psychotic thought, no auditory hallucinations present, and no visual hallucinations present  Mood: \"anxious and depressed\"  Affect: mood congruent  Insight: good  Judgment: intact, adequate for safety  Impulse Control: intact  Oriented to: time, place, person, and situation  Attention Span and Concentration: Normal  Language: Intact  Recent and Remote Memory: Delayed & immediate recall intact  Fund of Knowledge/Assessment of Intelligence: Average  Capacity of Activities of Daily Living: Independent, able to participate in programmatic care services.    Diagnosis/es:  No diagnosis found.      Assessment/Plan:  Lora presents today for follow-up psychiatric evaluation and assessment of progress. She discharges from the program day, and she feels a mix of relief and accomplishment, as she reflected on the lifelong skills she has learned.   Since Wellbutrin was increased, she reports feeling motivated, positive, and in good spirits overall. Importantly, she denies any suicidal or self-harm thoughts, signaling significant emotional stability. She is confident in her ability to maintain her progress. She will follow-up with  outpatient provider scheduled for December 5th    Depression   Overall improved  Continue with current medication regimen  Wellbutrin 300 mg   Fluoxetine 40 mg daily  Trazodone 50 mg at bedtime  Improve sleep hygiene  Maintain a balanced diet  Engage in physical activities as tolerated     Anxiety  Overall improved   As noted above  Lorazepam 0.5 mg once a day as needed   Hydroxyzine 25 mg  twice daily      Safety Assessment:  Lora reports suicidal ideation and/or non-suicidal self-injury or thoughts thereof " as noted above  Lora is future-oriented and is engaged in treatment planning   I do not feel that Lora meets criteria for a 72-hour involuntary hold and remains appropriate for an outpatient level of care    RAINA DUARTE DNP on 11/7/2024 at 12:27 PM    Visit Details:  Type of service: In-person  Location (patient and provider): [facility] Adult Mental Health Outpatient Programmatic Care Offices    Level of Medical Decision Making:   - At least 1 chronic problem that is not stable  - Engaged in prescription drug management during visit (discussed any medication benefits, side effects, alternatives, etc.)  Discussion of management or test interpretation with external physician/other qualified healthcare professional/appropriate source - programmatic care multidisciplinary treatment team    30 min spent on the date of the encounter in chart review, patient visit, review of tests, documentation, care coordination, and/or discussion with other providers about the issues documented above.      This document completed in part using [employer] dictation software and therefore may contain inadvertent word or phrase substitutions.

## 2024-11-07 NOTE — GROUP NOTE
Psychotherapy Group Note    PATIENT'S NAME: Lora Cardenas  MRN:   [id number]  :   1975  ACCT. NUMBER: [id number]  DATE OF SERVICE: 24  START TIME: 10:00 AM  END TIME: 10:50 AM  FACILITATOR: Magda Matta LICSW  TOPIC: MH EBP Group: Emotions Management  [facility] Mental [facility] Outpatient Programs  TRACK: IOP 1     NUMBER OF PARTICIPANTS: 6    Summary of Group / Topics Discussed:  Emotions Management: Model of Emotions: Patients were introduced to the cyclical model of emotions.  Explored emotions are shaped by different events and one s interpretation of events.  Group discussed how emotions begin with an event, followed by one s interpretation, followed by associated feelings.  Discussion included a review of personal urges and actions that can/do follow an emotional experience in the patient s life, and the end results.    Patient Session Goals / Objectives:  Demonstrate understanding of types various emotions.  Identify and discuss specific emotions and when they occur; understand triggers.  Identify individual emotions and physical sensations that accompany them.  Discuss urges and actions, and how to influence the intensity of emotional reactions and disrupt the cycle.    Discuss barriers to emotional regulation.  Choose 1-2 strategies to assist with emotional response to potentially distressing situations.      Patient Participation / Response:  Fully participated with the group by sharing personal reflections / insights and openly received / provided feedback with other participants.    Demonstrated understanding of topics discussed through group discussion and participation    Treatment Plan:  Patient has a current master individualized treatment plan.  See Epic treatment plan for more information.    BRYNN Edward

## 2024-11-08 ENCOUNTER — TELEPHONE (OUTPATIENT)
Dept: BEHAVIORAL HEALTH | Facility: CLINIC | Age: 49
End: 2024-11-08
Payer: COMMERCIAL

## 2024-11-08 ASSESSMENT — COLUMBIA-SUICIDE SEVERITY RATING SCALE - C-SSRS
1. SINCE LAST CONTACT, HAVE YOU WISHED YOU WERE DEAD OR WISHED YOU COULD GO TO SLEEP AND NOT WAKE UP?: NO
2. HAVE YOU ACTUALLY HAD ANY THOUGHTS OF KILLING YOURSELF?: NO

## 2024-11-08 NOTE — TELEPHONE ENCOUNTER
----- Message from Lynn Ibrahim sent at 11/8/2024  9:04 AM CST -----  Regarding: Pt. Discharging from IOP 1  Patient Lora is discharging from IOP 1 (M-TH 9-Noon) effective yesterday 11/7.  Please remove any future appointments from the [facility].    All the best,    Lynn Ibrahim, LICSW

## 2024-11-18 NOTE — ADDENDUM NOTE
Encounter addended by: Lyric Guzmán DNP on: 11/17/2024 8:58 PM   Actions taken: Order Reconciliation Section accessed, Clinical Note Signed, Charge Capture section accepted

## 2025-04-19 ENCOUNTER — HEALTH MAINTENANCE LETTER (OUTPATIENT)
Age: 50
End: 2025-04-19

## (undated) DEVICE — POSITIONER ARMBOARD FOAM 1PAIR LF FP-ARMB1

## (undated) DEVICE — EYE PREP BETADINE 5% SOLUTION 30ML 0065-0411-30

## (undated) DEVICE — LINEN TOWEL PACK X5 5464

## (undated) DEVICE — STRAP KNEE/BODY 31143004

## (undated) DEVICE — DRSG TEGADERM 4X4 3/4" 1626W

## (undated) DEVICE — COVER CAMERA IN-LIGHT DISP LT-C02

## (undated) DEVICE — EYE MARKING PAD 581057

## (undated) DEVICE — SOL WATER IRRIG 1000ML BOTTLE 2F7114

## (undated) DEVICE — SU VICRYL 6-0 S-29 12" J556G

## (undated) DEVICE — SU VICRYL 8-0 TG140-8DA 12" J548G

## (undated) DEVICE — GLOVE BIOGEL PI MICRO SZ 6.5 48565

## (undated) DEVICE — PACK MINOR EYE

## (undated) DEVICE — ESU EYE LOW TEMP 65410-010

## (undated) DEVICE — SU SILK 5-0 TF 18" N266H

## (undated) RX ORDER — ACETAMINOPHEN 325 MG/1
TABLET ORAL
Status: DISPENSED
Start: 2023-08-02

## (undated) RX ORDER — FENTANYL CITRATE 50 UG/ML
INJECTION, SOLUTION INTRAMUSCULAR; INTRAVENOUS
Status: DISPENSED
Start: 2023-08-02

## (undated) RX ORDER — EPHEDRINE SULFATE 50 MG/ML
INJECTION, SOLUTION INTRAMUSCULAR; INTRAVENOUS; SUBCUTANEOUS
Status: DISPENSED
Start: 2023-08-02

## (undated) RX ORDER — OXYCODONE HYDROCHLORIDE 5 MG/1
TABLET ORAL
Status: DISPENSED
Start: 2023-08-02